# Patient Record
Sex: MALE | Race: WHITE | NOT HISPANIC OR LATINO | Employment: OTHER
[De-identification: names, ages, dates, MRNs, and addresses within clinical notes are randomized per-mention and may not be internally consistent; named-entity substitution may affect disease eponyms.]

---

## 2017-03-08 ENCOUNTER — HOSPITAL (OUTPATIENT)
Dept: OTHER | Age: 77
End: 2017-03-08
Attending: HOSPITALIST

## 2017-03-08 ENCOUNTER — CHARTING TRANS (OUTPATIENT)
Dept: OTHER | Age: 77
End: 2017-03-08

## 2017-03-08 ENCOUNTER — DIAGNOSTIC TRANS (OUTPATIENT)
Dept: OTHER | Age: 77
End: 2017-03-08

## 2017-03-08 LAB
ANALYZER ANC (IANC): ABNORMAL
ANION GAP SERPL CALC-SCNC: 13 MMOL/L (ref 10–20)
APTT PPP: 51 SECONDS (ref 22–30)
APTT PPP: ABNORMAL S
BASOPHILS # BLD: 0 THOUSAND/MCL (ref 0–0.3)
BASOPHILS NFR BLD: 0 %
BUN SERPL-MCNC: 15 MG/DL (ref 6–20)
BUN/CREAT SERPL: 13 (ref 7–25)
CALCIUM SERPL-MCNC: 8.8 MG/DL (ref 8.4–10.2)
CHLORIDE: 106 MMOL/L (ref 98–107)
CO2 SERPL-SCNC: 26 MMOL/L (ref 21–32)
CREAT SERPL-MCNC: 1.19 MG/DL (ref 0.67–1.17)
DIFFERENTIAL METHOD BLD: ABNORMAL
EOSINOPHIL # BLD: 0.1 THOUSAND/MCL (ref 0.1–0.5)
EOSINOPHIL NFR BLD: 3 %
ERYTHROCYTE [DISTWIDTH] IN BLOOD: 12.7 % (ref 11–15)
GLUCOSE SERPL-MCNC: 108 MG/DL (ref 65–99)
HEMATOCRIT: 37.6 % (ref 39–51)
HGB BLD-MCNC: 12.9 GM/DL (ref 13–17)
INR PPP: 1
LYMPHOCYTES # BLD: 1.3 THOUSAND/MCL (ref 1–4)
LYMPHOCYTES NFR BLD: 33 %
MCH RBC QN AUTO: 31.9 PG (ref 26–34)
MCHC RBC AUTO-ENTMCNC: 34.3 GM/DL (ref 32–36.5)
MCV RBC AUTO: 92.8 FL (ref 78–100)
MONOCYTES # BLD: 0.3 THOUSAND/MCL (ref 0.3–0.9)
MONOCYTES NFR BLD: 8 %
NEUTROPHILS # BLD: 2.2 THOUSAND/MCL (ref 1.8–7.7)
NEUTROPHILS NFR BLD: 56 %
NEUTS SEG NFR BLD: ABNORMAL %
PERCENT NRBC: ABNORMAL
PLATELET # BLD: 147 THOUSAND/MCL (ref 140–450)
POTASSIUM SERPL-SCNC: 4.2 MMOL/L (ref 3.4–5.1)
PROTHROMBIN TIME: 10.7 SECONDS (ref 9.7–11.8)
PROTHROMBIN TIME: NORMAL
RBC # BLD: 4.05 MILLION/MCL (ref 4.5–5.9)
SODIUM SERPL-SCNC: 141 MMOL/L (ref 135–145)
TROPONIN I SERPL HS-MCNC: 0.09 NG/ML
TROPONIN I SERPL HS-MCNC: 2.87 NG/ML
WBC # BLD: 4 THOUSAND/MCL (ref 4.2–11)

## 2017-03-09 LAB
ANALYZER ANC (IANC): ABNORMAL
ANION GAP SERPL CALC-SCNC: 11 MMOL/L (ref 10–20)
BUN SERPL-MCNC: 12 MG/DL (ref 6–20)
BUN/CREAT SERPL: 11 (ref 7–25)
CALCIUM SERPL-MCNC: 8.9 MG/DL (ref 8.4–10.2)
CHLORIDE: 107 MMOL/L (ref 98–107)
CO2 SERPL-SCNC: 26 MMOL/L (ref 21–32)
CREAT SERPL-MCNC: 1.14 MG/DL (ref 0.67–1.17)
ERYTHROCYTE [DISTWIDTH] IN BLOOD: 13.1 % (ref 11–15)
GLUCOSE SERPL-MCNC: 99 MG/DL (ref 65–99)
HEMATOCRIT: 34.7 % (ref 39–51)
HGB BLD-MCNC: 11.7 GM/DL (ref 13–17)
MCH RBC QN AUTO: 31.2 PG (ref 26–34)
MCHC RBC AUTO-ENTMCNC: 33.7 GM/DL (ref 32–36.5)
MCV RBC AUTO: 92.5 FL (ref 78–100)
PLATELET # BLD: 122 THOUSAND/MCL (ref 140–450)
POTASSIUM SERPL-SCNC: 4.6 MMOL/L (ref 3.4–5.1)
RBC # BLD: 3.75 MILLION/MCL (ref 4.5–5.9)
SODIUM SERPL-SCNC: 139 MMOL/L (ref 135–145)
TROPONIN I SERPL HS-MCNC: 1.47 NG/ML
WBC # BLD: 4.9 THOUSAND/MCL (ref 4.2–11)

## 2017-03-23 ENCOUNTER — HOSPITAL (OUTPATIENT)
Dept: OTHER | Age: 77
End: 2017-03-23
Attending: INTERNAL MEDICINE

## 2017-03-27 ENCOUNTER — HOSPITAL (OUTPATIENT)
Dept: OTHER | Age: 77
End: 2017-03-27
Attending: HOSPITALIST

## 2017-03-27 ENCOUNTER — CHARTING TRANS (OUTPATIENT)
Dept: OTHER | Age: 77
End: 2017-03-27

## 2017-03-27 LAB
ANALYZER ANC (IANC): ABNORMAL
ANION GAP SERPL CALC-SCNC: 16 MMOL/L (ref 10–20)
APTT PPP: 26 SECONDS (ref 22–30)
APTT PPP: 53 SECONDS (ref 22–30)
APTT PPP: ABNORMAL S
APTT PPP: NORMAL S
BASOPHILS # BLD: 0 THOUSAND/MCL (ref 0–0.3)
BASOPHILS NFR BLD: 0 %
BUN SERPL-MCNC: 18 MG/DL (ref 6–20)
BUN/CREAT SERPL: 15 (ref 7–25)
CALCIUM SERPL-MCNC: 9 MG/DL (ref 8.4–10.2)
CHLORIDE: 103 MMOL/L (ref 98–107)
CO2 SERPL-SCNC: 26 MMOL/L (ref 21–32)
CREAT SERPL-MCNC: 1.19 MG/DL (ref 0.67–1.17)
DIFFERENTIAL METHOD BLD: ABNORMAL
EOSINOPHIL # BLD: 0.1 THOUSAND/MCL (ref 0.1–0.5)
EOSINOPHIL NFR BLD: 2 %
ERYTHROCYTE [DISTWIDTH] IN BLOOD: 12.8 % (ref 11–15)
GLUCOSE SERPL-MCNC: 111 MG/DL (ref 65–99)
HEMATOCRIT: 37.2 % (ref 39–51)
HGB BLD-MCNC: 12.7 GM/DL (ref 13–17)
INR PPP: 1
LYMPHOCYTES # BLD: 1.1 THOUSAND/MCL (ref 1–4)
LYMPHOCYTES NFR BLD: 23 %
MCH RBC QN AUTO: 31.7 PG (ref 26–34)
MCHC RBC AUTO-ENTMCNC: 34.1 GM/DL (ref 32–36.5)
MCV RBC AUTO: 92.8 FL (ref 78–100)
MONOCYTES # BLD: 0.5 THOUSAND/MCL (ref 0.3–0.9)
MONOCYTES NFR BLD: 9 %
NEUTROPHILS # BLD: 3.2 THOUSAND/MCL (ref 1.8–7.7)
NEUTROPHILS NFR BLD: 66 %
NEUTS SEG NFR BLD: ABNORMAL %
PERCENT NRBC: ABNORMAL
PLATELET # BLD: 145 THOUSAND/MCL (ref 140–450)
POTASSIUM SERPL-SCNC: 4.6 MMOL/L (ref 3.4–5.1)
PROTHROMBIN TIME: 11.1 SECONDS (ref 9.7–11.8)
PROTHROMBIN TIME: NORMAL
RBC # BLD: 4.01 MILLION/MCL (ref 4.5–5.9)
SODIUM SERPL-SCNC: 140 MMOL/L (ref 135–145)
TROPONIN I SERPL HS-MCNC: <0.02 NG/ML
WBC # BLD: 4.9 THOUSAND/MCL (ref 4.2–11)

## 2017-03-28 ENCOUNTER — DIAGNOSTIC TRANS (OUTPATIENT)
Dept: OTHER | Age: 77
End: 2017-03-28

## 2017-03-28 LAB
ANALYZER ANC (IANC): ABNORMAL
ANION GAP SERPL CALC-SCNC: 14 MMOL/L (ref 10–20)
BUN SERPL-MCNC: 16 MG/DL (ref 6–20)
BUN/CREAT SERPL: 13 (ref 7–25)
CALCIUM SERPL-MCNC: 9 MG/DL (ref 8.4–10.2)
CHLORIDE: 106 MMOL/L (ref 98–107)
CO2 SERPL-SCNC: 26 MMOL/L (ref 21–32)
CREAT SERPL-MCNC: 1.2 MG/DL (ref 0.67–1.17)
ERYTHROCYTE [DISTWIDTH] IN BLOOD: 13 % (ref 11–15)
GLUCOSE SERPL-MCNC: 96 MG/DL (ref 65–99)
HEMATOCRIT: 37.5 % (ref 39–51)
HGB BLD-MCNC: 12.6 GM/DL (ref 13–17)
MAGNESIUM SERPL-MCNC: 2.1 MG/DL (ref 1.7–2.4)
MCH RBC QN AUTO: 31.3 PG (ref 26–34)
MCHC RBC AUTO-ENTMCNC: 33.6 GM/DL (ref 32–36.5)
MCV RBC AUTO: 93.3 FL (ref 78–100)
PLATELET # BLD: 130 THOUSAND/MCL (ref 140–450)
POTASSIUM SERPL-SCNC: 4.6 MMOL/L (ref 3.4–5.1)
RBC # BLD: 4.02 MILLION/MCL (ref 4.5–5.9)
SODIUM SERPL-SCNC: 141 MMOL/L (ref 135–145)
TROPONIN I SERPL HS-MCNC: <0.02 NG/ML
WBC # BLD: 3.3 THOUSAND/MCL (ref 4.2–11)

## 2017-03-29 LAB
ANALYZER ANC (IANC): ABNORMAL
ERYTHROCYTE [DISTWIDTH] IN BLOOD: 13 % (ref 11–15)
HEMATOCRIT: 36.7 % (ref 39–51)
HGB BLD-MCNC: 12.5 GM/DL (ref 13–17)
MCH RBC QN AUTO: 31.6 PG (ref 26–34)
MCHC RBC AUTO-ENTMCNC: 34.1 GM/DL (ref 32–36.5)
MCV RBC AUTO: 92.7 FL (ref 78–100)
PLATELET # BLD: 127 THOUSAND/MCL (ref 140–450)
RBC # BLD: 3.96 MILLION/MCL (ref 4.5–5.9)
WBC # BLD: 3.7 THOUSAND/MCL (ref 4.2–11)

## 2017-04-01 ENCOUNTER — HOSPITAL (OUTPATIENT)
Dept: OTHER | Age: 77
End: 2017-04-01
Attending: INTERNAL MEDICINE

## 2017-05-01 ENCOUNTER — HOSPITAL (OUTPATIENT)
Dept: OTHER | Age: 77
End: 2017-05-01
Attending: INTERNAL MEDICINE

## 2017-06-01 ENCOUNTER — HOSPITAL (OUTPATIENT)
Dept: OTHER | Age: 77
End: 2017-06-01
Attending: INTERNAL MEDICINE

## 2017-09-14 LAB
ANALYZER ANC (IANC): ABNORMAL
ANION GAP SERPL CALC-SCNC: 14 MMOL/L (ref 10–20)
BASOPHILS # BLD: 0 THOUSAND/MCL (ref 0–0.3)
BASOPHILS NFR BLD: 0 %
BUN SERPL-MCNC: 14 MG/DL (ref 6–20)
BUN/CREAT SERPL: 12 (ref 7–25)
CALCIUM SERPL-MCNC: 9.5 MG/DL (ref 8.4–10.2)
CHLORIDE: 95 MMOL/L (ref 98–107)
CO2 SERPL-SCNC: 23 MMOL/L (ref 21–32)
CREAT SERPL-MCNC: 1.17 MG/DL (ref 0.67–1.17)
DIFFERENTIAL METHOD BLD: ABNORMAL
EOSINOPHIL # BLD: 0.1 THOUSAND/MCL (ref 0.1–0.5)
EOSINOPHIL NFR BLD: 1 %
ERYTHROCYTE [DISTWIDTH] IN BLOOD: 12.5 % (ref 11–15)
GLUCOSE SERPL-MCNC: 112 MG/DL (ref 65–99)
HEMATOCRIT: 37.4 % (ref 39–51)
HGB BLD-MCNC: 13 GM/DL (ref 13–17)
LYMPHOCYTES # BLD: 1 THOUSAND/MCL (ref 1–4)
LYMPHOCYTES NFR BLD: 21 %
MCH RBC QN AUTO: 31 PG (ref 26–34)
MCHC RBC AUTO-ENTMCNC: 34.8 GM/DL (ref 32–36.5)
MCV RBC AUTO: 89 FL (ref 78–100)
MONOCYTES # BLD: 0.5 THOUSAND/MCL (ref 0.3–0.9)
MONOCYTES NFR BLD: 9 %
NEUTROPHILS # BLD: 3.3 THOUSAND/MCL (ref 1.8–7.7)
NEUTROPHILS NFR BLD: 69 %
NEUTS SEG NFR BLD: ABNORMAL %
PERCENT NRBC: ABNORMAL
PLATELET # BLD: 161 THOUSAND/MCL (ref 140–450)
POTASSIUM SERPL-SCNC: 4.8 MMOL/L (ref 3.4–5.1)
RBC # BLD: 4.2 MILLION/MCL (ref 4.5–5.9)
SODIUM SERPL-SCNC: 127 MMOL/L (ref 135–145)
TROPONIN I SERPL HS-MCNC: <0.02 NG/ML
TROPONIN I SERPL HS-MCNC: <0.02 NG/ML
WBC # BLD: 4.8 THOUSAND/MCL (ref 4.2–11)

## 2017-09-15 ENCOUNTER — DIAGNOSTIC TRANS (OUTPATIENT)
Dept: OTHER | Age: 77
End: 2017-09-15

## 2017-09-15 ENCOUNTER — HOSPITAL (OUTPATIENT)
Dept: OTHER | Age: 77
End: 2017-09-15
Attending: INTERNAL MEDICINE

## 2017-09-15 LAB
SODIUM SERPL-SCNC: 133 MMOL/L (ref 135–145)
TROPONIN I SERPL HS-MCNC: <0.02 NG/ML

## 2017-09-16 ENCOUNTER — CHARTING TRANS (OUTPATIENT)
Dept: OTHER | Age: 77
End: 2017-09-16

## 2017-10-10 PROCEDURE — 84153 ASSAY OF PSA TOTAL: CPT | Performed by: PHYSICIAN ASSISTANT

## 2017-10-10 PROCEDURE — 84154 ASSAY OF PSA FREE: CPT | Performed by: PHYSICIAN ASSISTANT

## 2017-10-12 ENCOUNTER — HOSPITAL (OUTPATIENT)
Dept: OTHER | Age: 77
End: 2017-10-12
Attending: INTERNAL MEDICINE

## 2018-03-13 PROBLEM — I25.810 CORONARY ARTERY DISEASE INVOLVING CORONARY BYPASS GRAFT OF NATIVE HEART WITHOUT ANGINA PECTORIS: Status: ACTIVE | Noted: 2018-03-13

## 2018-03-20 PROBLEM — D69.6 THROMBOCYTOPENIA (HCC): Status: ACTIVE | Noted: 2018-03-20

## 2018-03-20 PROBLEM — I42.0 DILATED CARDIOMYOPATHY (HCC): Status: ACTIVE | Noted: 2018-03-20

## 2018-03-20 PROBLEM — D69.6 THROMBOCYTOPENIA: Status: ACTIVE | Noted: 2018-03-20

## 2018-03-20 PROCEDURE — 87046 STOOL CULTR AEROBIC BACT EA: CPT | Performed by: INTERNAL MEDICINE

## 2018-03-20 PROCEDURE — 87493 C DIFF AMPLIFIED PROBE: CPT | Performed by: INTERNAL MEDICINE

## 2018-03-20 PROCEDURE — 87045 FECES CULTURE AEROBIC BACT: CPT | Performed by: INTERNAL MEDICINE

## 2018-04-01 ENCOUNTER — CHARTING TRANS (OUTPATIENT)
Dept: OTHER | Age: 78
End: 2018-04-01

## 2018-04-01 ENCOUNTER — HOSPITAL (OUTPATIENT)
Dept: OTHER | Age: 78
End: 2018-04-01
Attending: INTERNAL MEDICINE

## 2018-04-01 LAB
ANALYZER ANC (IANC): ABNORMAL
ANION GAP SERPL CALC-SCNC: 12 MMOL/L (ref 10–20)
BASOPHILS # BLD: 0 THOUSAND/MCL (ref 0–0.3)
BASOPHILS NFR BLD: 0 %
BUN SERPL-MCNC: 13 MG/DL (ref 6–20)
BUN/CREAT SERPL: 10 (ref 7–25)
CALCIUM SERPL-MCNC: 9.3 MG/DL (ref 8.4–10.2)
CHLORIDE: 103 MMOL/L (ref 98–107)
CO2 SERPL-SCNC: 27 MMOL/L (ref 21–32)
CREAT SERPL-MCNC: 1.28 MG/DL (ref 0.67–1.17)
DIFFERENTIAL METHOD BLD: ABNORMAL
EOSINOPHIL # BLD: 0.2 THOUSAND/MCL (ref 0.1–0.5)
EOSINOPHIL NFR BLD: 4 %
ERYTHROCYTE [DISTWIDTH] IN BLOOD: 13.5 % (ref 11–15)
GLUCOSE SERPL-MCNC: 101 MG/DL (ref 65–99)
HEMATOCRIT: 40 % (ref 39–51)
HGB BLD-MCNC: 13.5 GM/DL (ref 13–17)
LYMPHOCYTES # BLD: 1.6 THOUSAND/MCL (ref 1–4)
LYMPHOCYTES NFR BLD: 35 %
MCH RBC QN AUTO: 31.6 PG (ref 26–34)
MCHC RBC AUTO-ENTMCNC: 33.8 GM/DL (ref 32–36.5)
MCV RBC AUTO: 93.7 FL (ref 78–100)
MONOCYTES # BLD: 0.6 THOUSAND/MCL (ref 0.3–0.9)
MONOCYTES NFR BLD: 13 %
NEUTROPHILS # BLD: 2.2 THOUSAND/MCL (ref 1.8–7.7)
NEUTROPHILS NFR BLD: 48 %
NEUTS SEG NFR BLD: ABNORMAL %
PERCENT NRBC: ABNORMAL
PLATELET # BLD: 189 THOUSAND/MCL (ref 140–450)
POTASSIUM SERPL-SCNC: 4.1 MMOL/L (ref 3.4–5.1)
RBC # BLD: 4.27 MILLION/MCL (ref 4.5–5.9)
SODIUM SERPL-SCNC: 138 MMOL/L (ref 135–145)
TROPONIN I SERPL HS-MCNC: 0.02 NG/ML
TROPONIN I SERPL HS-MCNC: <0.02 NG/ML
WBC # BLD: 4.5 THOUSAND/MCL (ref 4.2–11)

## 2018-04-02 ENCOUNTER — DIAGNOSTIC TRANS (OUTPATIENT)
Dept: OTHER | Age: 78
End: 2018-04-02

## 2018-04-03 PROCEDURE — 83516 IMMUNOASSAY NONANTIBODY: CPT | Performed by: INTERNAL MEDICINE

## 2018-04-03 PROCEDURE — 86256 FLUORESCENT ANTIBODY TITER: CPT | Performed by: INTERNAL MEDICINE

## 2018-04-03 PROCEDURE — 82784 ASSAY IGA/IGD/IGG/IGM EACH: CPT | Performed by: INTERNAL MEDICINE

## 2018-04-10 ENCOUNTER — HOSPITAL (OUTPATIENT)
Dept: OTHER | Age: 78
End: 2018-04-10
Attending: EMERGENCY MEDICINE

## 2018-04-16 RX ORDER — MAG HYDROX/ALUMINUM HYD/SIMETH 400-400-40
SUSPENSION, ORAL (FINAL DOSE FORM) ORAL DAILY
COMMUNITY

## 2018-04-16 RX ORDER — MULTIVITAMIN
1 TABLET ORAL DAILY
COMMUNITY

## 2018-04-16 RX ORDER — HYDRALAZINE HYDROCHLORIDE 25 MG/1
25 TABLET, FILM COATED ORAL AS NEEDED
COMMUNITY
End: 2018-06-05

## 2018-04-16 RX ORDER — THIAMINE HCL 100 MG
500 TABLET ORAL DAILY
COMMUNITY

## 2018-04-20 ENCOUNTER — HOSPITAL ENCOUNTER (OUTPATIENT)
Facility: HOSPITAL | Age: 78
Setting detail: HOSPITAL OUTPATIENT SURGERY
Discharge: HOME | End: 2018-04-20
Attending: INTERNAL MEDICINE | Admitting: INTERNAL MEDICINE
Payer: MEDICARE

## 2018-04-20 ENCOUNTER — SURGERY (OUTPATIENT)
Age: 78
End: 2018-04-20

## 2018-04-20 VITALS
WEIGHT: 158 LBS | TEMPERATURE: 98 F | DIASTOLIC BLOOD PRESSURE: 78 MMHG | HEIGHT: 72 IN | OXYGEN SATURATION: 100 % | BODY MASS INDEX: 21.4 KG/M2 | RESPIRATION RATE: 16 BRPM | HEART RATE: 52 BPM | SYSTOLIC BLOOD PRESSURE: 152 MMHG

## 2018-04-20 DIAGNOSIS — R19.7 DIARRHEA, UNSPECIFIED TYPE: ICD-10-CM

## 2018-04-20 PROCEDURE — 88305 TISSUE EXAM BY PATHOLOGIST: CPT | Performed by: INTERNAL MEDICINE

## 2018-04-20 PROCEDURE — 0DBK8ZX EXCISION OF ASCENDING COLON, VIA NATURAL OR ARTIFICIAL OPENING ENDOSCOPIC, DIAGNOSTIC: ICD-10-PCS | Performed by: INTERNAL MEDICINE

## 2018-04-20 PROCEDURE — 0DBN8ZX EXCISION OF SIGMOID COLON, VIA NATURAL OR ARTIFICIAL OPENING ENDOSCOPIC, DIAGNOSTIC: ICD-10-PCS | Performed by: INTERNAL MEDICINE

## 2018-04-20 RX ORDER — SODIUM CHLORIDE, SODIUM LACTATE, POTASSIUM CHLORIDE, CALCIUM CHLORIDE 600; 310; 30; 20 MG/100ML; MG/100ML; MG/100ML; MG/100ML
INJECTION, SOLUTION INTRAVENOUS CONTINUOUS
Status: DISCONTINUED | OUTPATIENT
Start: 2018-04-20 | End: 2018-04-20

## 2018-04-20 NOTE — OPERATIVE REPORT
OPERATIVE REPORT   PATIENT NAME: Jay Self  MRN: [id number]  DATE OF OPERATION: 4/20/2018  PREOPERATIVE DIAGNOSIS: Diarrhea possibly attributed to medications  POSTOPERATIVE DIAGNOSES   1.  Tortuous colon  2. 8 mm sessile descending colon polyp removed w and an updated medication list.   PREP Quality indicators:  Aronchick scale    EXCELLENT - small volume of clear liquid > 95% of mucosa see  GOOD  - clear liquid covering up to 25% of mucosa, but > 90% of mucosa seen  FAIR  - some semisolid stool could be

## 2018-04-20 NOTE — H&P
History & Physical Examination    Patient Name: Indu Hyunh  MRN: [id number]  CSN: [id number]  YOB: 1940    Diagnosis: Diarrhea, unspecified type [R19.7]      Present Illness:  Indu Huynh is a 66year old male is here Diarrhea, unspecifie walk.  Sulfa Antibiotics       Rash  Zocor [Simvastatin]     Pain    Comment:joint    Past Surgical History:  1/28/2014: BYPASS SURGERY      Comment: CABG x 5 LIMA-LAD, SVG- 2nd diagonal, 1st OM,                Posterior descending & RCA  No date: CABG

## 2018-04-20 NOTE — PROGRESS NOTES
After patient had been brought to recovery, Dr. Allyson Merlin realized he needed to take random colon biopsies. Patient brought back to procedure room, appropriate time outs performed, biopsies obtained and patient brought back to recovery.

## 2018-04-28 NOTE — PROGRESS NOTES
Your colonoscopy showed a polyp in the colon that was removed. Here are the  biopsy/pathology findings from your recent Colonoscopy : Adenomatous polyp(s) was(were) found in the colon. These polyps are benign but can carry cancer risk.   Polyp(s) was

## 2018-05-22 ENCOUNTER — HOSPITAL (OUTPATIENT)
Dept: OTHER | Age: 78
End: 2018-05-22
Attending: INTERNAL MEDICINE

## 2018-07-21 ENCOUNTER — CHARTING TRANS (OUTPATIENT)
Dept: OTHER | Age: 78
End: 2018-07-21

## 2018-08-29 ENCOUNTER — CHARTING TRANS (OUTPATIENT)
Dept: OTHER | Age: 78
End: 2018-08-29

## 2018-10-11 ENCOUNTER — HOSPITAL (OUTPATIENT)
Dept: OTHER | Age: 78
End: 2018-10-11
Attending: EMERGENCY MEDICINE

## 2018-10-11 LAB
ANALYZER ANC (IANC): ABNORMAL
ANION GAP SERPL CALC-SCNC: 14 MMOL/L (ref 10–20)
BASOPHILS # BLD: 0 THOUSAND/MCL (ref 0–0.3)
BASOPHILS NFR BLD: 1 %
BUN SERPL-MCNC: 17 MG/DL (ref 6–20)
BUN/CREAT SERPL: 15 (ref 7–25)
CALCIUM SERPL-MCNC: 9.5 MG/DL (ref 8.4–10.2)
CHLORIDE: 101 MMOL/L (ref 98–107)
CO2 SERPL-SCNC: 27 MMOL/L (ref 21–32)
CREAT SERPL-MCNC: 1.17 MG/DL (ref 0.67–1.17)
DIFFERENTIAL METHOD BLD: ABNORMAL
EOSINOPHIL # BLD: 0.2 THOUSAND/MCL (ref 0.1–0.5)
EOSINOPHIL NFR BLD: 5 %
ERYTHROCYTE [DISTWIDTH] IN BLOOD: 13.1 % (ref 11–15)
GLUCOSE SERPL-MCNC: 104 MG/DL (ref 65–99)
HEMATOCRIT: 39.1 % (ref 39–51)
HGB BLD-MCNC: 13.3 GM/DL (ref 13–17)
LYMPHOCYTES # BLD: 1.3 THOUSAND/MCL (ref 1–4)
LYMPHOCYTES NFR BLD: 35 %
MCH RBC QN AUTO: 31.9 PG (ref 26–34)
MCHC RBC AUTO-ENTMCNC: 34 GM/DL (ref 32–36.5)
MCV RBC AUTO: 93.8 FL (ref 78–100)
MONOCYTES # BLD: 0.4 THOUSAND/MCL (ref 0.3–0.9)
MONOCYTES NFR BLD: 11 %
NEUTROPHILS # BLD: 1.7 THOUSAND/MCL (ref 1.8–7.7)
NEUTROPHILS NFR BLD: 48 %
NEUTS SEG NFR BLD: ABNORMAL %
NRBC (NRBCRE): ABNORMAL
PLATELET # BLD: 168 THOUSAND/MCL (ref 140–450)
POTASSIUM SERPL-SCNC: 4.8 MMOL/L (ref 3.4–5.1)
RBC # BLD: 4.17 MILLION/MCL (ref 4.5–5.9)
SODIUM SERPL-SCNC: 137 MMOL/L (ref 135–145)
TROPONIN I SERPL HS-MCNC: 0.03 NG/ML
TROPONIN I SERPL HS-MCNC: <0.02 NG/ML
WBC # BLD: 3.5 THOUSAND/MCL (ref 4.2–11)

## 2018-10-12 ENCOUNTER — DIAGNOSTIC TRANS (OUTPATIENT)
Dept: OTHER | Age: 78
End: 2018-10-12

## 2018-10-31 VITALS
WEIGHT: 160 LBS | OXYGEN SATURATION: 100 % | DIASTOLIC BLOOD PRESSURE: 70 MMHG | BODY MASS INDEX: 21.67 KG/M2 | TEMPERATURE: 97.7 F | RESPIRATION RATE: 18 BRPM | SYSTOLIC BLOOD PRESSURE: 138 MMHG | HEART RATE: 56 BPM | HEIGHT: 72 IN

## 2018-11-27 VITALS
DIASTOLIC BLOOD PRESSURE: 82 MMHG | RESPIRATION RATE: 16 BRPM | SYSTOLIC BLOOD PRESSURE: 132 MMHG | WEIGHT: 159 LBS | HEART RATE: 60 BPM | TEMPERATURE: 97.8 F | HEIGHT: 72 IN | BODY MASS INDEX: 21.54 KG/M2

## 2019-02-03 ENCOUNTER — DIAGNOSTIC TRANS (OUTPATIENT)
Dept: OTHER | Age: 79
End: 2019-02-03

## 2019-02-03 ENCOUNTER — HOSPITAL (OUTPATIENT)
Dept: OTHER | Age: 79
End: 2019-02-03
Attending: INTERNAL MEDICINE

## 2019-02-03 ENCOUNTER — HOSPITAL (OUTPATIENT)
Dept: OTHER | Age: 79
End: 2019-02-03

## 2019-02-03 LAB
ANALYZER ANC (IANC): ABNORMAL
ANION GAP SERPL CALC-SCNC: 14 MMOL/L (ref 10–20)
BASOPHILS # BLD: 0 THOUSAND/MCL (ref 0–0.3)
BASOPHILS NFR BLD: 1 %
BUN SERPL-MCNC: 17 MG/DL (ref 6–20)
BUN/CREAT SERPL: 13 (ref 7–25)
CALCIUM SERPL-MCNC: 9.2 MG/DL (ref 8.4–10.2)
CHLORIDE: 102 MMOL/L (ref 98–107)
CO2 SERPL-SCNC: 25 MMOL/L (ref 21–32)
CREAT SERPL-MCNC: 1.26 MG/DL (ref 0.67–1.17)
DIFFERENTIAL METHOD BLD: ABNORMAL
EOSINOPHIL # BLD: 0.2 THOUSAND/MCL (ref 0.1–0.5)
EOSINOPHIL NFR BLD: 3 %
ERYTHROCYTE [DISTWIDTH] IN BLOOD: 12.6 % (ref 11–15)
ERYTHROCYTE [SEDIMENTATION RATE] IN BLOOD BY WESTERGREN METHOD: 8 MM/HR (ref 0–20)
GLUCOSE SERPL-MCNC: 104 MG/DL (ref 65–99)
HEMATOCRIT: 39.6 % (ref 39–51)
HGB BLD-MCNC: 13.5 GM/DL (ref 13–17)
IMM GRANULOCYTES # BLD AUTO: 0 THOUSAND/MCL (ref 0–0.2)
IMM GRANULOCYTES NFR BLD: 0 %
LYMPHOCYTES # BLD: 1.1 THOUSAND/MCL (ref 1–4)
LYMPHOCYTES NFR BLD: 25 %
MCH RBC QN AUTO: 31.7 PG (ref 26–34)
MCHC RBC AUTO-ENTMCNC: 34.1 GM/DL (ref 32–36.5)
MCV RBC AUTO: 93 FL (ref 78–100)
MONOCYTES # BLD: 0.5 THOUSAND/MCL (ref 0.3–0.9)
MONOCYTES NFR BLD: 11 %
NEUTROPHILS # BLD: 2.6 THOUSAND/MCL (ref 1.8–7.7)
NEUTROPHILS NFR BLD: 60 %
NEUTS SEG NFR BLD: ABNORMAL %
NRBC (NRBCRE): 0 /100 WBC
PLATELET # BLD: 158 THOUSAND/MCL (ref 140–450)
POTASSIUM SERPL-SCNC: 4.4 MMOL/L (ref 3.4–5.1)
RBC # BLD: 4.26 MILLION/MCL (ref 4.5–5.9)
SODIUM SERPL-SCNC: 137 MMOL/L (ref 135–145)
TROPONIN I SERPL HS-MCNC: <0.02 NG/ML
WBC # BLD: 4.4 THOUSAND/MCL (ref 4.2–11)

## 2019-02-04 ENCOUNTER — HOSPITAL (OUTPATIENT)
Dept: OTHER | Age: 79
End: 2019-02-04

## 2019-02-04 LAB
ALBUMIN SERPL-MCNC: 3.6 GM/DL (ref 3.6–5.1)
ALBUMIN/GLOB SERPL: 1.4 {RATIO} (ref 1–2.4)
ALP SERPL-CCNC: 73 UNIT/L (ref 45–117)
ALT SERPL-CCNC: 17 UNIT/L
ANALYZER ANC (IANC): ABNORMAL
ANION GAP SERPL CALC-SCNC: 13 MMOL/L (ref 10–20)
AST SERPL-CCNC: 12 UNIT/L
BASOPHILS # BLD: 0 THOUSAND/MCL (ref 0–0.3)
BASOPHILS NFR BLD: 1 %
BILIRUB SERPL-MCNC: 0.5 MG/DL (ref 0.2–1)
BUN SERPL-MCNC: 17 MG/DL (ref 6–20)
BUN/CREAT SERPL: 13 (ref 7–25)
CALCIUM SERPL-MCNC: 9.1 MG/DL (ref 8.4–10.2)
CHLORIDE: 105 MMOL/L (ref 98–107)
CO2 SERPL-SCNC: 25 MMOL/L (ref 21–32)
CREAT SERPL-MCNC: 1.35 MG/DL (ref 0.67–1.17)
DIFFERENTIAL METHOD BLD: ABNORMAL
EOSINOPHIL # BLD: 0.2 THOUSAND/MCL (ref 0.1–0.5)
EOSINOPHIL NFR BLD: 5 %
ERYTHROCYTE [DISTWIDTH] IN BLOOD: 12.7 % (ref 11–15)
GLOBULIN SER-MCNC: 2.6 GM/DL (ref 2–4)
GLUCOSE SERPL-MCNC: 96 MG/DL (ref 65–99)
HEMATOCRIT: 37.6 % (ref 39–51)
HGB BLD-MCNC: 12.9 GM/DL (ref 13–17)
IMM GRANULOCYTES # BLD AUTO: 0 THOUSAND/MCL (ref 0–0.2)
IMM GRANULOCYTES NFR BLD: 0 %
LYMPHOCYTES # BLD: 1.2 THOUSAND/MCL (ref 1–4)
LYMPHOCYTES NFR BLD: 31 %
MAGNESIUM SERPL-MCNC: 2.1 MG/DL (ref 1.7–2.4)
MCH RBC QN AUTO: 31.5 PG (ref 26–34)
MCHC RBC AUTO-ENTMCNC: 34.3 GM/DL (ref 32–36.5)
MCV RBC AUTO: 91.7 FL (ref 78–100)
MONOCYTES # BLD: 0.5 THOUSAND/MCL (ref 0.3–0.9)
MONOCYTES NFR BLD: 13 %
NEUTROPHILS # BLD: 2 THOUSAND/MCL (ref 1.8–7.7)
NEUTROPHILS NFR BLD: 50 %
NEUTS SEG NFR BLD: ABNORMAL %
NRBC (NRBCRE): 0 /100 WBC
PLATELET # BLD: 148 THOUSAND/MCL (ref 140–450)
POTASSIUM SERPL-SCNC: 4.5 MMOL/L (ref 3.4–5.1)
PROT SERPL-MCNC: 6.2 GM/DL (ref 6.4–8.2)
RBC # BLD: 4.1 MILLION/MCL (ref 4.5–5.9)
SODIUM SERPL-SCNC: 138 MMOL/L (ref 135–145)
WBC # BLD: 3.9 THOUSAND/MCL (ref 4.2–11)

## 2019-02-13 PROBLEM — D69.6 THROMBOCYTOPENIA (HCC): Status: RESOLVED | Noted: 2018-03-20 | Resolved: 2019-02-13

## 2019-02-13 PROBLEM — R07.9 CHEST PAIN: Status: ACTIVE | Noted: 2019-02-13

## 2019-02-13 PROBLEM — D69.6 THROMBOCYTOPENIA: Status: RESOLVED | Noted: 2018-03-20 | Resolved: 2019-02-13

## 2019-02-13 PROBLEM — I25.5 ISCHEMIC CARDIOMYOPATHY: Status: ACTIVE | Noted: 2019-02-13

## 2019-02-13 PROBLEM — I70.0 ATHEROSCLEROSIS OF AORTA (HCC): Status: ACTIVE | Noted: 2019-02-13

## 2019-02-13 PROBLEM — I51.89 DIASTOLIC DYSFUNCTION: Status: ACTIVE | Noted: 2019-02-13

## 2019-02-13 PROBLEM — I70.0 ATHEROSCLEROSIS OF AORTA: Status: ACTIVE | Noted: 2019-02-13

## 2019-02-13 PROBLEM — N18.30 CKD (CHRONIC KIDNEY DISEASE) STAGE 3, GFR 30-59 ML/MIN (HCC): Status: ACTIVE | Noted: 2019-02-13

## 2019-02-24 ENCOUNTER — DIAGNOSTIC TRANS (OUTPATIENT)
Dept: OTHER | Age: 79
End: 2019-02-24

## 2019-02-24 ENCOUNTER — HOSPITAL (OUTPATIENT)
Dept: OTHER | Age: 79
End: 2019-02-24
Attending: HOSPITALIST

## 2019-02-24 ENCOUNTER — HOSPITAL (OUTPATIENT)
Dept: OTHER | Age: 79
End: 2019-02-24

## 2019-02-24 LAB
ALBUMIN SERPL-MCNC: 4.3 GM/DL (ref 3.6–5.1)
ALP SERPL-CCNC: 84 UNIT/L (ref 45–117)
ALT SERPL-CCNC: 25 UNIT/L
ANALYZER ANC (IANC): ABNORMAL
ANION GAP SERPL CALC-SCNC: 16 MMOL/L (ref 10–20)
AST SERPL-CCNC: 24 UNIT/L
BASOPHILS # BLD: 0 THOUSAND/MCL (ref 0–0.3)
BASOPHILS NFR BLD: 0 %
BILIRUB CONJ SERPL-MCNC: 0.1 MG/DL (ref 0–0.2)
BILIRUB SERPL-MCNC: 0.5 MG/DL (ref 0.2–1)
BUN SERPL-MCNC: 16 MG/DL (ref 6–20)
BUN/CREAT SERPL: 12 (ref 7–25)
CALCIUM SERPL-MCNC: 9.2 MG/DL (ref 8.4–10.2)
CHLORIDE: 98 MMOL/L (ref 98–107)
CO2 SERPL-SCNC: 25 MMOL/L (ref 21–32)
CREAT SERPL-MCNC: 1.33 MG/DL (ref 0.67–1.17)
CREAT SERPL-MCNC: 1.39 MG/DL (ref 0.67–1.17)
D DIMER PPP FEU-MCNC: 0.62 MG/L FEU
DIFFERENTIAL METHOD BLD: ABNORMAL
EOSINOPHIL # BLD: 0.1 THOUSAND/MCL (ref 0.1–0.5)
EOSINOPHIL NFR BLD: 2 %
ERYTHROCYTE [DISTWIDTH] IN BLOOD: 12.3 % (ref 11–15)
GLUCOSE SERPL-MCNC: 105 MG/DL (ref 65–99)
HEMATOCRIT: 39 % (ref 39–51)
HGB BLD-MCNC: 13.3 GM/DL (ref 13–17)
IMM GRANULOCYTES # BLD AUTO: 0 THOUSAND/MCL (ref 0–0.2)
IMM GRANULOCYTES NFR BLD: 0 %
LIPASE SERPL-CCNC: 344 UNIT/L (ref 73–393)
LYMPHOCYTES # BLD: 1.6 THOUSAND/MCL (ref 1–4)
LYMPHOCYTES NFR BLD: 32 %
MAGNESIUM SERPL-MCNC: 2 MG/DL (ref 1.7–2.4)
MCH RBC QN AUTO: 31.3 PG (ref 26–34)
MCHC RBC AUTO-ENTMCNC: 34.1 GM/DL (ref 32–36.5)
MCV RBC AUTO: 91.8 FL (ref 78–100)
MONOCYTES # BLD: 0.5 THOUSAND/MCL (ref 0.3–0.9)
MONOCYTES NFR BLD: 11 %
NEUTROPHILS # BLD: 2.7 THOUSAND/MCL (ref 1.8–7.7)
NEUTROPHILS NFR BLD: 55 %
NEUTS SEG NFR BLD: ABNORMAL %
NRBC (NRBCRE): 0 /100 WBC
PLATELET # BLD: 168 THOUSAND/MCL (ref 140–450)
POTASSIUM SERPL-SCNC: 4 MMOL/L (ref 3.4–5.1)
POTASSIUM SERPL-SCNC: 4.5 MMOL/L (ref 3.4–5.1)
PROT SERPL-MCNC: 7.3 GM/DL (ref 6.4–8.2)
RBC # BLD: 4.25 MILLION/MCL (ref 4.5–5.9)
SODIUM SERPL-SCNC: 135 MMOL/L (ref 135–145)
TROPONIN I SERPL HS-MCNC: <0.02 NG/ML
TROPONIN I SERPL HS-MCNC: <0.02 NG/ML
WBC # BLD: 4.9 THOUSAND/MCL (ref 4.2–11)

## 2019-02-25 LAB
ANALYZER ANC (IANC): ABNORMAL
ANION GAP SERPL CALC-SCNC: 14 MMOL/L (ref 10–20)
BUN SERPL-MCNC: 18 MG/DL (ref 6–20)
BUN/CREAT SERPL: 12 (ref 7–25)
CALCIUM SERPL-MCNC: 8.8 MG/DL (ref 8.4–10.2)
CHLORIDE: 106 MMOL/L (ref 98–107)
CO2 SERPL-SCNC: 26 MMOL/L (ref 21–32)
CREAT SERPL-MCNC: 1.47 MG/DL (ref 0.67–1.17)
ERYTHROCYTE [DISTWIDTH] IN BLOOD: 12.6 % (ref 11–15)
GLUCOSE SERPL-MCNC: 89 MG/DL (ref 65–99)
HEMATOCRIT: 36.3 % (ref 39–51)
HGB BLD-MCNC: 12 GM/DL (ref 13–17)
MAGNESIUM SERPL-MCNC: 2 MG/DL (ref 1.7–2.4)
MCH RBC QN AUTO: 30.7 PG (ref 26–34)
MCHC RBC AUTO-ENTMCNC: 33.1 GM/DL (ref 32–36.5)
MCV RBC AUTO: 92.8 FL (ref 78–100)
NRBC (NRBCRE): 0 /100 WBC
PLATELET # BLD: 145 THOUSAND/MCL (ref 140–450)
POTASSIUM SERPL-SCNC: 4.7 MMOL/L (ref 3.4–5.1)
RBC # BLD: 3.91 MILLION/MCL (ref 4.5–5.9)
SODIUM SERPL-SCNC: 141 MMOL/L (ref 135–145)
WBC # BLD: 3.5 THOUSAND/MCL (ref 4.2–11)

## 2019-07-07 ENCOUNTER — HOSPITAL (OUTPATIENT)
Dept: OTHER | Age: 79
End: 2019-07-07
Attending: FAMILY MEDICINE

## 2019-12-18 ENCOUNTER — HOSPITAL (OUTPATIENT)
Dept: OTHER | Age: 79
End: 2019-12-18

## 2019-12-18 ENCOUNTER — DIAGNOSTIC TRANS (OUTPATIENT)
Dept: OTHER | Age: 79
End: 2019-12-18

## 2019-12-18 LAB
ALBUMIN SERPL-MCNC: 3.6 G/DL (ref 3.6–5.1)
ALP SERPL-CCNC: 72 UNITS/L (ref 45–117)
ALT SERPL-CCNC: 22 UNITS/L
ANALYZER ANC (IANC): ABNORMAL
ANALYZER ANC (IANC): ABNORMAL
ANION GAP SERPL CALC-SCNC: 8 MMOL/L (ref 10–20)
AST SERPL-CCNC: 10 UNITS/L
BASOPHILS # BLD: 0 K/MCL (ref 0–0.3)
BASOPHILS # BLD: 0 K/MCL (ref 0–0.3)
BASOPHILS NFR BLD: 0 %
BASOPHILS NFR BLD: 0 %
BILIRUB CONJ SERPL-MCNC: <0.1 MG/DL (ref 0–0.2)
BILIRUB SERPL-MCNC: 0.4 MG/DL (ref 0.2–1)
BUN SERPL-MCNC: 47 MG/DL (ref 6–20)
BUN/CREAT SERPL: 36 (ref 7–25)
CALCIUM SERPL-MCNC: 8.6 MG/DL (ref 8.4–10.2)
CHLORIDE SERPL-SCNC: 111 MMOL/L (ref 98–107)
CO2 SERPL-SCNC: 25 MMOL/L (ref 21–32)
CREAT SERPL-MCNC: 1.32 MG/DL (ref 0.67–1.17)
DIFFERENTIAL METHOD BLD: ABNORMAL
DIFFERENTIAL METHOD BLD: ABNORMAL
EOSINOPHIL # BLD: 0 K/MCL (ref 0.1–0.5)
EOSINOPHIL # BLD: 0.1 K/MCL (ref 0.1–0.5)
EOSINOPHIL NFR BLD: 0 %
EOSINOPHIL NFR BLD: 1 %
ERYTHROCYTE [DISTWIDTH] IN BLOOD: 13 % (ref 11–15)
ERYTHROCYTE [DISTWIDTH] IN BLOOD: 13.1 % (ref 11–15)
GLUCOSE SERPL-MCNC: 111 MG/DL (ref 65–99)
HCT VFR BLD CALC: 25.7 % (ref 39–51)
HCT VFR BLD CALC: 30.7 % (ref 39–51)
HCT VFR BLD CALC: 36.3 % (ref 39–51)
HGB BLD-MCNC: 10.1 G/DL (ref 13–17)
HGB BLD-MCNC: 11.7 G/DL (ref 13–17)
HGB BLD-MCNC: 8.5 G/DL (ref 13–17)
IMM GRANULOCYTES # BLD AUTO: 0 K/MCL (ref 0–0.2)
IMM GRANULOCYTES # BLD AUTO: 0 K/MCL (ref 0–0.2)
IMM GRANULOCYTES NFR BLD: 0 %
IMM GRANULOCYTES NFR BLD: 1 %
LIPASE SERPL-CCNC: 74 UNITS/L (ref 73–393)
LYMPHOCYTES # BLD: 1.6 K/MCL (ref 1–4)
LYMPHOCYTES # BLD: 1.6 K/MCL (ref 1–4)
LYMPHOCYTES NFR BLD: 21 %
LYMPHOCYTES NFR BLD: 22 %
MAGNESIUM SERPL-MCNC: 2.6 MG/DL (ref 1.7–2.4)
MCH RBC QN AUTO: 31.1 PG (ref 26–34)
MCH RBC QN AUTO: 32.2 PG (ref 26–34)
MCHC RBC AUTO-ENTMCNC: 32.2 G/DL (ref 32–36.5)
MCHC RBC AUTO-ENTMCNC: 32.9 G/DL (ref 32–36.5)
MCV RBC AUTO: 96.5 FL (ref 78–100)
MCV RBC AUTO: 97.8 FL (ref 78–100)
MONOCYTES # BLD: 0.6 K/MCL (ref 0.3–0.9)
MONOCYTES # BLD: 0.6 K/MCL (ref 0.3–0.9)
MONOCYTES NFR BLD: 8 %
MONOCYTES NFR BLD: 8 %
NEUTROPHILS # BLD: 5 K/MCL (ref 1.8–7.7)
NEUTROPHILS # BLD: 5.2 K/MCL (ref 1.8–7.7)
NEUTROPHILS NFR BLD: 69 %
NEUTROPHILS NFR BLD: 70 %
NEUTS SEG NFR BLD: ABNORMAL %
NEUTS SEG NFR BLD: ABNORMAL %
NRBC (NRBCRE): 0 /100 WBC
NRBC (NRBCRE): 0 /100 WBC
PLATELET # BLD: 176 K/MCL (ref 140–450)
PLATELET # BLD: 188 K/MCL (ref 140–450)
POTASSIUM SERPL-SCNC: 4.5 MMOL/L (ref 3.4–5.1)
PROT SERPL-MCNC: 6.4 G/DL (ref 6.4–8.2)
RBC # BLD: 3.14 MIL/MCL (ref 4.5–5.9)
RBC # BLD: 3.76 MIL/MCL (ref 4.5–5.9)
SODIUM SERPL-SCNC: 139 MMOL/L (ref 135–145)
TROPONIN I SERPL HS-MCNC: <0.02 NG/ML
TROPONIN I SERPL HS-MCNC: <0.02 NG/ML
WBC # BLD: 7.2 K/MCL (ref 4.2–11)
WBC # BLD: 7.5 K/MCL (ref 4.2–11)

## 2019-12-19 ENCOUNTER — HOSPITAL (OUTPATIENT)
Dept: OTHER | Age: 79
End: 2019-12-19

## 2019-12-19 LAB
ANION GAP SERPL CALC-SCNC: 5 MMOL/L (ref 10–20)
BUN SERPL-MCNC: 54 MG/DL (ref 6–20)
BUN/CREAT SERPL: 37 (ref 7–25)
CALCIUM SERPL-MCNC: 8.4 MG/DL (ref 8.4–10.2)
CHLORIDE SERPL-SCNC: 116 MMOL/L (ref 98–107)
CO2 SERPL-SCNC: 25 MMOL/L (ref 21–32)
CREAT SERPL-MCNC: 1.45 MG/DL (ref 0.67–1.17)
GLUCOSE SERPL-MCNC: 99 MG/DL (ref 65–99)
HCT VFR BLD CALC: 24.2 % (ref 39–51)
HCT VFR BLD CALC: 24.9 % (ref 39–51)
HCT VFR BLD CALC: 25.4 % (ref 39–51)
HGB BLD-MCNC: 7.9 G/DL (ref 13–17)
HGB BLD-MCNC: 8.1 G/DL (ref 13–17)
HGB BLD-MCNC: 8.4 G/DL (ref 13–17)
POTASSIUM SERPL-SCNC: 4.4 MMOL/L (ref 3.4–5.1)
SODIUM SERPL-SCNC: 142 MMOL/L (ref 135–145)
TROPONIN I SERPL HS-MCNC: <0.02 NG/ML

## 2019-12-20 LAB
ANION GAP SERPL CALC-SCNC: 6 MMOL/L (ref 10–20)
BUN SERPL-MCNC: 36 MG/DL (ref 6–20)
BUN/CREAT SERPL: 26 (ref 7–25)
CALCIUM SERPL-MCNC: 8.5 MG/DL (ref 8.4–10.2)
CHLORIDE SERPL-SCNC: 115 MMOL/L (ref 98–107)
CO2 SERPL-SCNC: 25 MMOL/L (ref 21–32)
CREAT SERPL-MCNC: 1.36 MG/DL (ref 0.67–1.17)
GLUCOSE SERPL-MCNC: 98 MG/DL (ref 65–99)
HCT VFR BLD CALC: 22.6 % (ref 39–51)
HCT VFR BLD CALC: 22.7 % (ref 39–51)
HGB BLD-MCNC: 7.3 G/DL (ref 13–17)
HGB BLD-MCNC: 7.4 G/DL (ref 13–17)
MAGNESIUM SERPL-MCNC: 2.2 MG/DL (ref 1.7–2.4)
POTASSIUM SERPL-SCNC: 4.3 MMOL/L (ref 3.4–5.1)
POTASSIUM SERPL-SCNC: 4.3 MMOL/L (ref 3.4–5.1)
SODIUM SERPL-SCNC: 142 MMOL/L (ref 135–145)

## 2019-12-23 LAB — PATHOLOGIST NAME: NORMAL

## 2020-03-02 PROBLEM — I13.0 HYPERTENSIVE HEART AND CHRONIC KIDNEY DISEASE WITH HEART FAILURE AND STAGE 1 THROUGH STAGE 4 CHRONIC KIDNEY DISEASE, OR UNSPECIFIED CHRONIC KIDNEY DISEASE (HCC): Status: ACTIVE | Noted: 2020-03-02

## 2020-03-12 ENCOUNTER — HOSPITAL ENCOUNTER (EMERGENCY)
Age: 80
Discharge: HOME | End: 2020-03-12
Attending: EMERGENCY MEDICINE

## 2020-03-12 ENCOUNTER — PRE-EVAL (OUTPATIENT)
Dept: GASTROENTEROLOGY | Age: 80
End: 2020-03-12

## 2020-03-12 ENCOUNTER — HOSPITAL ENCOUNTER (OUTPATIENT)
Dept: GASTROENTEROLOGY | Age: 80
Discharge: HOME | End: 2020-03-12
Attending: INTERNAL MEDICINE

## 2020-03-12 ENCOUNTER — DOCUMENTATION (OUTPATIENT)
Dept: GASTROENTEROLOGY | Age: 80
End: 2020-03-12

## 2020-03-12 VITALS
RESPIRATION RATE: 14 BRPM | HEIGHT: 72 IN | DIASTOLIC BLOOD PRESSURE: 80 MMHG | HEART RATE: 55 BPM | BODY MASS INDEX: 22.99 KG/M2 | OXYGEN SATURATION: 99 % | TEMPERATURE: 98.1 F | SYSTOLIC BLOOD PRESSURE: 168 MMHG | WEIGHT: 169.75 LBS

## 2020-03-12 VITALS
HEIGHT: 72 IN | TEMPERATURE: 97.2 F | BODY MASS INDEX: 23.44 KG/M2 | SYSTOLIC BLOOD PRESSURE: 200 MMHG | HEART RATE: 50 BPM | RESPIRATION RATE: 16 BRPM | OXYGEN SATURATION: 96 % | DIASTOLIC BLOOD PRESSURE: 95 MMHG | WEIGHT: 173.06 LBS

## 2020-03-12 DIAGNOSIS — Z87.11 HISTORY OF GASTRIC ULCER: Primary | ICD-10-CM

## 2020-03-12 DIAGNOSIS — K27.9 PEPTIC ULCER WITHOUT HEMORRHAGE OR PERFORATION BUT WITH OBSTRUCTION (CMD): ICD-10-CM

## 2020-03-12 DIAGNOSIS — K56.609 PEPTIC ULCER WITHOUT HEMORRHAGE OR PERFORATION BUT WITH OBSTRUCTION (CMD): ICD-10-CM

## 2020-03-12 DIAGNOSIS — I10 ESSENTIAL HYPERTENSION, BENIGN: Primary | ICD-10-CM

## 2020-03-12 PROCEDURE — 10002803 HB RX 637: Performed by: EMERGENCY MEDICINE

## 2020-03-12 PROCEDURE — 99282 EMERGENCY DEPT VISIT SF MDM: CPT

## 2020-03-12 PROCEDURE — 13000024 HB GI COMPLEX CASE S/U + 1ST 15 MIN

## 2020-03-12 RX ORDER — COLESEVELAM 180 1/1
1875 TABLET ORAL 2 TIMES DAILY WITH MEALS
COMMUNITY
End: 2023-03-14

## 2020-03-12 RX ORDER — EZETIMIBE 10 MG/1
10 TABLET ORAL DAILY
COMMUNITY

## 2020-03-12 RX ORDER — CLOPIDOGREL BISULFATE 75 MG/1
75 TABLET ORAL DAILY
COMMUNITY

## 2020-03-12 RX ORDER — METOPROLOL SUCCINATE 25 MG/1
25 TABLET, EXTENDED RELEASE ORAL DAILY
COMMUNITY

## 2020-03-12 RX ORDER — HYDRALAZINE HYDROCHLORIDE 10 MG/1
10 TABLET, FILM COATED ORAL ONCE
Status: COMPLETED | OUTPATIENT
Start: 2020-03-12 | End: 2020-03-12

## 2020-03-12 RX ADMIN — HYDRALAZINE HYDROCHLORIDE 10 MG: 10 TABLET ORAL at 16:11

## 2020-03-12 SDOH — HEALTH STABILITY: MENTAL HEALTH: HOW OFTEN DO YOU HAVE A DRINK CONTAINING ALCOHOL?: NEVER

## 2020-03-12 ASSESSMENT — PAIN SCALES - GENERAL
PAINLEVEL_OUTOF10: 0

## 2020-03-12 ASSESSMENT — ACTIVITIES OF DAILY LIVING (ADL)
ADL_SCORE: 12
NEEDS_ASSIST: NO
CHRONIC_PAIN_PRESENT: NO
HISTORY OF FALLING IN THE LAST YEAR (PRIOR TO ADMISSION): NO
ADL_SHORT_OF_BREATH: NO
ADL_BEFORE_ADMISSION: INDEPENDENT

## 2020-03-12 ASSESSMENT — COGNITIVE AND FUNCTIONAL STATUS - GENERAL
ARE YOU BLIND OR DO YOU HAVE SERIOUS DIFFICULTY SEEING, EVEN WHEN WEARING GLASSES: NO
ARE YOU DEAF OR DO YOU HAVE SERIOUS DIFFICULTY  HEARING: NO

## 2020-07-10 ENCOUNTER — APPOINTMENT (OUTPATIENT)
Dept: CT IMAGING | Age: 80
End: 2020-07-10
Attending: EMERGENCY MEDICINE

## 2020-07-10 ENCOUNTER — HOSPITAL ENCOUNTER (OUTPATIENT)
Age: 80
Setting detail: OBSERVATION
Discharge: HOME | End: 2020-07-12
Attending: EMERGENCY MEDICINE | Admitting: INTERNAL MEDICINE

## 2020-07-10 DIAGNOSIS — R07.9 CHEST PAIN, UNSPECIFIED TYPE: Primary | ICD-10-CM

## 2020-07-10 LAB
ANION GAP SERPL CALC-SCNC: 11 MMOL/L (ref 10–20)
APTT PPP: 26 SEC (ref 22–32)
BASOPHILS # BLD: 0 K/MCL (ref 0–0.3)
BASOPHILS NFR BLD: 0 %
BUN SERPL-MCNC: 16 MG/DL (ref 6–20)
BUN/CREAT SERPL: 13 (ref 7–25)
CALCIUM SERPL-MCNC: 8.6 MG/DL (ref 8.4–10.2)
CHLORIDE SERPL-SCNC: 99 MMOL/L (ref 98–107)
CO2 SERPL-SCNC: 25 MMOL/L (ref 21–32)
CREAT SERPL-MCNC: 1.21 MG/DL (ref 0.67–1.17)
DIFFERENTIAL METHOD BLD: ABNORMAL
EOSINOPHIL # BLD: 0.1 K/MCL (ref 0.1–0.5)
EOSINOPHIL NFR BLD: 2 %
ERYTHROCYTE [DISTWIDTH] IN BLOOD: 13.7 % (ref 11–15)
GLUCOSE SERPL-MCNC: 139 MG/DL (ref 65–99)
HCT VFR BLD CALC: 34.3 % (ref 39–51)
HGB BLD-MCNC: 12 G/DL (ref 13–17)
IMM GRANULOCYTES # BLD AUTO: 0 K/MCL (ref 0–0.2)
IMM GRANULOCYTES NFR BLD: 0 %
INR PPP: 1
LYMPHOCYTES # BLD: 1 K/MCL (ref 1–4)
LYMPHOCYTES NFR BLD: 26 %
MCH RBC QN AUTO: 31.3 PG (ref 26–34)
MCHC RBC AUTO-ENTMCNC: 35 G/DL (ref 32–36.5)
MCV RBC AUTO: 89.3 FL (ref 78–100)
MONOCYTES # BLD: 0.4 K/MCL (ref 0.3–0.9)
MONOCYTES NFR BLD: 10 %
NEUTROPHILS # BLD: 2.4 K/MCL (ref 1.8–7.7)
NEUTROPHILS NFR BLD: 62 %
NRBC BLD MANUAL-RTO: 0 /100 WBC
NT-PROBNP SERPL-MCNC: 891 PG/ML
PLATELET # BLD: 163 K/MCL (ref 140–450)
POTASSIUM SERPL-SCNC: 3.7 MMOL/L (ref 3.4–5.1)
PROTHROMBIN TIME: 10.7 SEC (ref 9.7–11.8)
RBC # BLD: 3.84 MIL/MCL (ref 4.5–5.9)
SARS-COV-2 RNA RESP QL NAA+PROBE: NOT DETECTED
SERVICE CMNT-IMP: NORMAL
SODIUM SERPL-SCNC: 131 MMOL/L (ref 135–145)
SPECIMEN SOURCE: NORMAL
TROPONIN I SERPL HS-MCNC: <0.02 NG/ML
WBC # BLD: 3.9 K/MCL (ref 4.2–11)

## 2020-07-10 PROCEDURE — 80048 BASIC METABOLIC PNL TOTAL CA: CPT

## 2020-07-10 PROCEDURE — 71275 CT ANGIOGRAPHY CHEST: CPT

## 2020-07-10 PROCEDURE — 85610 PROTHROMBIN TIME: CPT

## 2020-07-10 PROCEDURE — G0378 HOSPITAL OBSERVATION PER HR: HCPCS

## 2020-07-10 PROCEDURE — 99285 EMERGENCY DEPT VISIT HI MDM: CPT

## 2020-07-10 PROCEDURE — C9803 HOPD COVID-19 SPEC COLLECT: HCPCS

## 2020-07-10 PROCEDURE — 83880 ASSAY OF NATRIURETIC PEPTIDE: CPT

## 2020-07-10 PROCEDURE — 85025 COMPLETE CBC W/AUTO DIFF WBC: CPT

## 2020-07-10 PROCEDURE — 85730 THROMBOPLASTIN TIME PARTIAL: CPT

## 2020-07-10 PROCEDURE — 87635 SARS-COV-2 COVID-19 AMP PRB: CPT

## 2020-07-10 PROCEDURE — 84484 ASSAY OF TROPONIN QUANT: CPT

## 2020-07-10 PROCEDURE — 93005 ELECTROCARDIOGRAM TRACING: CPT | Performed by: EMERGENCY MEDICINE

## 2020-07-10 PROCEDURE — 10002805 HB CONTRAST AGENT: Performed by: EMERGENCY MEDICINE

## 2020-07-10 RX ADMIN — IOHEXOL 100 ML: 350 INJECTION, SOLUTION INTRAVENOUS at 22:30

## 2020-07-11 ENCOUNTER — APPOINTMENT (OUTPATIENT)
Dept: CARDIOLOGY | Age: 80
End: 2020-07-11
Attending: INTERNAL MEDICINE

## 2020-07-11 ENCOUNTER — APPOINTMENT (OUTPATIENT)
Dept: GENERAL RADIOLOGY | Age: 80
End: 2020-07-11
Attending: HOSPITALIST

## 2020-07-11 LAB
ANION GAP SERPL CALC-SCNC: 8 MMOL/L (ref 10–20)
ATRIAL RATE (BPM): 55
BASOPHILS # BLD: 0 K/MCL (ref 0–0.3)
BASOPHILS NFR BLD: 0 %
BUN SERPL-MCNC: 14 MG/DL (ref 6–20)
BUN/CREAT SERPL: 12 (ref 7–25)
CALCIUM SERPL-MCNC: 9.1 MG/DL (ref 8.4–10.2)
CHLORIDE SERPL-SCNC: 105 MMOL/L (ref 98–107)
CO2 SERPL-SCNC: 27 MMOL/L (ref 21–32)
CREAT SERPL-MCNC: 1.15 MG/DL (ref 0.67–1.17)
DIFFERENTIAL METHOD BLD: ABNORMAL
EOSINOPHIL # BLD: 0.1 K/MCL (ref 0.1–0.5)
EOSINOPHIL NFR BLD: 3 %
ERYTHROCYTE [DISTWIDTH] IN BLOOD: 13.7 % (ref 11–15)
GLUCOSE SERPL-MCNC: 90 MG/DL (ref 65–99)
HCT VFR BLD CALC: 34.3 % (ref 39–51)
HGB BLD-MCNC: 12 G/DL (ref 13–17)
IMM GRANULOCYTES # BLD AUTO: 0 K/MCL (ref 0–0.2)
IMM GRANULOCYTES NFR BLD: 1 %
LYMPHOCYTES # BLD: 1.2 K/MCL (ref 1–4)
LYMPHOCYTES NFR BLD: 33 %
MAGNESIUM SERPL-MCNC: 2.3 MG/DL (ref 1.7–2.4)
MCH RBC QN AUTO: 30.8 PG (ref 26–34)
MCHC RBC AUTO-ENTMCNC: 35 G/DL (ref 32–36.5)
MCV RBC AUTO: 87.9 FL (ref 78–100)
MONOCYTES # BLD: 0.5 K/MCL (ref 0.3–0.9)
MONOCYTES NFR BLD: 14 %
NEUTROPHILS # BLD: 1.8 K/MCL (ref 1.8–7.7)
NEUTROPHILS NFR BLD: 49 %
NRBC BLD MANUAL-RTO: 0 /100 WBC
P AXIS (DEGREES): 63
PLATELET # BLD: 142 K/MCL (ref 140–450)
POTASSIUM SERPL-SCNC: 4.2 MMOL/L (ref 3.4–5.1)
PR-INTERVAL (MSEC): 174
QRS-INTERVAL (MSEC): 90
QT-INTERVAL (MSEC): 454
QTC: 434
R AXIS (DEGREES): 36
RBC # BLD: 3.9 MIL/MCL (ref 4.5–5.9)
REPORT TEXT: NORMAL
SODIUM SERPL-SCNC: 136 MMOL/L (ref 135–145)
T AXIS (DEGREES): 22
TROPONIN I SERPL HS-MCNC: <0.02 NG/ML
TROPONIN I SERPL HS-MCNC: <0.02 NG/ML
VENTRICULAR RATE EKG/MIN (BPM): 55
WBC # BLD: 3.6 K/MCL (ref 4.2–11)

## 2020-07-11 PROCEDURE — 84484 ASSAY OF TROPONIN QUANT: CPT

## 2020-07-11 PROCEDURE — G0378 HOSPITAL OBSERVATION PER HR: HCPCS

## 2020-07-11 PROCEDURE — 96376 TX/PRO/DX INJ SAME DRUG ADON: CPT

## 2020-07-11 PROCEDURE — 10002803 HB RX 637: Performed by: INTERNAL MEDICINE

## 2020-07-11 PROCEDURE — 93016 CV STRESS TEST SUPVJ ONLY: CPT | Performed by: INTERNAL MEDICINE

## 2020-07-11 PROCEDURE — 10002801 HB RX 250 W/O HCPCS: Performed by: INTERNAL MEDICINE

## 2020-07-11 PROCEDURE — 93017 CV STRESS TEST TRACING ONLY: CPT

## 2020-07-11 PROCEDURE — 85025 COMPLETE CBC W/AUTO DIFF WBC: CPT

## 2020-07-11 PROCEDURE — 78452 HT MUSCLE IMAGE SPECT MULT: CPT

## 2020-07-11 PROCEDURE — 93005 ELECTROCARDIOGRAM TRACING: CPT | Performed by: INTERNAL MEDICINE

## 2020-07-11 PROCEDURE — 10004651 HB RX, NO CHARGE ITEM: Performed by: INTERNAL MEDICINE

## 2020-07-11 PROCEDURE — 10002800 HB RX 250 W HCPCS: Performed by: INTERNAL MEDICINE

## 2020-07-11 PROCEDURE — 93018 CV STRESS TEST I&R ONLY: CPT | Performed by: INTERNAL MEDICINE

## 2020-07-11 PROCEDURE — A9500 TC99M SESTAMIBI: HCPCS | Performed by: INTERNAL MEDICINE

## 2020-07-11 PROCEDURE — 96374 THER/PROPH/DIAG INJ IV PUSH: CPT

## 2020-07-11 PROCEDURE — 80048 BASIC METABOLIC PNL TOTAL CA: CPT

## 2020-07-11 PROCEDURE — 10002800 HB RX 250 W HCPCS: Performed by: NURSE PRACTITIONER

## 2020-07-11 PROCEDURE — 83735 ASSAY OF MAGNESIUM: CPT

## 2020-07-11 PROCEDURE — 96375 TX/PRO/DX INJ NEW DRUG ADDON: CPT

## 2020-07-11 PROCEDURE — 10002803 HB RX 637: Performed by: HOSPITALIST

## 2020-07-11 PROCEDURE — 36415 COLL VENOUS BLD VENIPUNCTURE: CPT

## 2020-07-11 PROCEDURE — 96372 THER/PROPH/DIAG INJ SC/IM: CPT

## 2020-07-11 PROCEDURE — 78452 HT MUSCLE IMAGE SPECT MULT: CPT | Performed by: INTERNAL MEDICINE

## 2020-07-11 PROCEDURE — 10006150 HB RX 343: Performed by: INTERNAL MEDICINE

## 2020-07-11 PROCEDURE — 73630 X-RAY EXAM OF FOOT: CPT

## 2020-07-11 RX ORDER — AMLODIPINE BESYLATE 5 MG/1
5 TABLET ORAL ONCE
Status: COMPLETED | OUTPATIENT
Start: 2020-07-11 | End: 2020-07-11

## 2020-07-11 RX ORDER — CLOPIDOGREL BISULFATE 75 MG/1
75 TABLET ORAL DAILY
Status: DISCONTINUED | OUTPATIENT
Start: 2020-07-11 | End: 2020-07-12 | Stop reason: HOSPADM

## 2020-07-11 RX ORDER — PANTOPRAZOLE SODIUM 40 MG/1
40 TABLET, DELAYED RELEASE ORAL 2 TIMES DAILY
COMMUNITY

## 2020-07-11 RX ORDER — METOPROLOL SUCCINATE 25 MG/1
25 TABLET, EXTENDED RELEASE ORAL DAILY
Status: DISCONTINUED | OUTPATIENT
Start: 2020-07-11 | End: 2020-07-12 | Stop reason: HOSPADM

## 2020-07-11 RX ORDER — ASPIRIN 81 MG/1
81 TABLET, CHEWABLE ORAL DAILY
Status: DISCONTINUED | OUTPATIENT
Start: 2020-07-11 | End: 2020-07-12 | Stop reason: HOSPADM

## 2020-07-11 RX ORDER — HYDRALAZINE HYDROCHLORIDE 25 MG/1
25 TABLET, FILM COATED ORAL DAILY PRN
Status: ON HOLD | COMMUNITY
End: 2023-03-17 | Stop reason: SDUPTHER

## 2020-07-11 RX ORDER — EZETIMIBE 10 MG/1
10 TABLET ORAL DAILY
Status: DISCONTINUED | OUTPATIENT
Start: 2020-07-11 | End: 2020-07-12 | Stop reason: HOSPADM

## 2020-07-11 RX ORDER — ENALAPRILAT 1.25 MG/ML
1.25 INJECTION INTRAVENOUS EVERY 6 HOURS SCHEDULED
Status: DISPENSED | OUTPATIENT
Start: 2020-07-11 | End: 2020-07-12

## 2020-07-11 RX ORDER — HYDRALAZINE HYDROCHLORIDE 25 MG/1
25 TABLET, FILM COATED ORAL EVERY 8 HOURS PRN
Status: DISCONTINUED | OUTPATIENT
Start: 2020-07-11 | End: 2020-07-12 | Stop reason: HOSPADM

## 2020-07-11 RX ORDER — 0.9 % SODIUM CHLORIDE 0.9 %
2 VIAL (ML) INJECTION EVERY 12 HOURS SCHEDULED
Status: DISCONTINUED | OUTPATIENT
Start: 2020-07-11 | End: 2020-07-12 | Stop reason: HOSPADM

## 2020-07-11 RX ORDER — REGADENOSON 0.08 MG/ML
0.4 INJECTION, SOLUTION INTRAVENOUS ONCE
Status: COMPLETED | OUTPATIENT
Start: 2020-07-11 | End: 2020-07-11

## 2020-07-11 RX ORDER — HEPARIN SODIUM 5000 [USP'U]/ML
5000 INJECTION, SOLUTION INTRAVENOUS; SUBCUTANEOUS EVERY 8 HOURS SCHEDULED
Status: DISCONTINUED | OUTPATIENT
Start: 2020-07-11 | End: 2020-07-12 | Stop reason: HOSPADM

## 2020-07-11 RX ORDER — NITROGLYCERIN 0.4 MG/1
0.4 TABLET SUBLINGUAL EVERY 5 MIN PRN
COMMUNITY

## 2020-07-11 RX ORDER — TETRAKIS(2-METHOXYISOBUTYLISOCYANIDE)COPPER(I) TETRAFLUOROBORATE 1 MG/ML
8 INJECTION, POWDER, LYOPHILIZED, FOR SOLUTION INTRAVENOUS ONCE
Status: COMPLETED | OUTPATIENT
Start: 2020-07-11 | End: 2020-07-11

## 2020-07-11 RX ORDER — TETRAKIS(2-METHOXYISOBUTYLISOCYANIDE)COPPER(I) TETRAFLUOROBORATE 1 MG/ML
24 INJECTION, POWDER, LYOPHILIZED, FOR SOLUTION INTRAVENOUS ONCE
Status: COMPLETED | OUTPATIENT
Start: 2020-07-11 | End: 2020-07-11

## 2020-07-11 RX ORDER — HYDRALAZINE HYDROCHLORIDE 20 MG/ML
10 INJECTION INTRAMUSCULAR; INTRAVENOUS EVERY 6 HOURS PRN
Status: DISCONTINUED | OUTPATIENT
Start: 2020-07-11 | End: 2020-07-12 | Stop reason: HOSPADM

## 2020-07-11 RX ORDER — PANTOPRAZOLE SODIUM 40 MG/1
40 TABLET, DELAYED RELEASE ORAL DAILY
Status: DISCONTINUED | OUTPATIENT
Start: 2020-07-11 | End: 2020-07-12 | Stop reason: HOSPADM

## 2020-07-11 RX ADMIN — HYDRALAZINE HYDROCHLORIDE 10 MG: 20 INJECTION INTRAMUSCULAR; INTRAVENOUS at 07:05

## 2020-07-11 RX ADMIN — SODIUM CHLORIDE, PRESERVATIVE FREE 2 ML: 5 INJECTION INTRAVENOUS at 08:05

## 2020-07-11 RX ADMIN — TETRAKIS(2-METHOXYISOBUTYLISOCYANIDE)COPPER(I) TETRAFLUOROBORATE 9.5 MILLICURIE: 1 INJECTION, POWDER, LYOPHILIZED, FOR SOLUTION INTRAVENOUS at 08:05

## 2020-07-11 RX ADMIN — AMLODIPINE BESYLATE 5 MG: 5 TABLET ORAL at 02:59

## 2020-07-11 RX ADMIN — TETRAKIS(2-METHOXYISOBUTYLISOCYANIDE)COPPER(I) TETRAFLUOROBORATE 30.6 MILLICURIE: 1 INJECTION, POWDER, LYOPHILIZED, FOR SOLUTION INTRAVENOUS at 10:05

## 2020-07-11 RX ADMIN — METOPROLOL SUCCINATE 25 MG: 25 TABLET, EXTENDED RELEASE ORAL at 08:05

## 2020-07-11 RX ADMIN — REGADENOSON 0.4 MG: 0.08 INJECTION, SOLUTION INTRAVENOUS at 10:11

## 2020-07-11 RX ADMIN — ASPIRIN 81 MG 81 MG: 81 TABLET ORAL at 08:05

## 2020-07-11 RX ADMIN — SODIUM CHLORIDE, PRESERVATIVE FREE 2 ML: 5 INJECTION INTRAVENOUS at 21:12

## 2020-07-11 RX ADMIN — ENALAPRILAT 1.25 MG: 1.25 INJECTION INTRAVENOUS at 18:43

## 2020-07-11 RX ADMIN — PANTOPRAZOLE SODIUM 40 MG: 40 TABLET, DELAYED RELEASE ORAL at 18:49

## 2020-07-11 RX ADMIN — ENALAPRILAT 1.25 MG: 1.25 INJECTION INTRAVENOUS at 08:05

## 2020-07-11 RX ADMIN — EZETIMIBE 10 MG: 10 TABLET ORAL at 08:09

## 2020-07-11 RX ADMIN — CLOPIDOGREL BISULFATE 75 MG: 75 TABLET, FILM COATED ORAL at 08:05

## 2020-07-11 RX ADMIN — HEPARIN SODIUM 5000 UNITS: 5000 INJECTION INTRAVENOUS; SUBCUTANEOUS at 21:12

## 2020-07-11 SDOH — HEALTH STABILITY: MENTAL HEALTH: HOW OFTEN DO YOU HAVE A DRINK CONTAINING ALCOHOL?: NEVER

## 2020-07-11 ASSESSMENT — LIFESTYLE VARIABLES
HOW OFTEN DO YOU HAVE A DRINK CONTAINING ALCOHOL: 2 TO 4 TIMES PER MONTH
HOW OFTEN DO YOU HAVE 6 OR MORE DRINKS ON ONE OCCASION: NEVER
HOW MANY STANDARD DRINKS CONTAINING ALCOHOL DO YOU HAVE ON A TYPICAL DAY: 0,1 OR 2
AUDIT-C TOTAL SCORE: 2
ALCOHOL_USE_STATUS: NO OR LOW RISK WITH VALIDATED TOOL

## 2020-07-11 ASSESSMENT — PAIN SCALES - GENERAL
PAINLEVEL_OUTOF10: 0
PAINLEVEL_OUTOF10: 0
PAINLEVEL_OUTOF10: 5

## 2020-07-11 ASSESSMENT — ENCOUNTER SYMPTOMS
CHEST TIGHTNESS: 1
CHILLS: 0
NUMBNESS: 0
HALLUCINATIONS: 0
HEADACHES: 0
COUGH: 0
NAUSEA: 0
DIARRHEA: 0
ABDOMINAL PAIN: 0
SINUS PRESSURE: 0
FATIGUE: 0
DIZZINESS: 0
SHORTNESS OF BREATH: 0
FEVER: 0
WEAKNESS: 0
CONFUSION: 0
WHEEZING: 0
EYE PAIN: 0
ABDOMINAL DISTENTION: 0
SORE THROAT: 0
VOMITING: 0

## 2020-07-11 ASSESSMENT — ACTIVITIES OF DAILY LIVING (ADL)
CHRONIC_PAIN_PRESENT: NO
ADL_SHORT_OF_BREATH: NO
RECENT_DECLINE_ADL: NO
ADL_BEFORE_ADMISSION: INDEPENDENT
ADL_SCORE: 12

## 2020-07-11 ASSESSMENT — COGNITIVE AND FUNCTIONAL STATUS - GENERAL
DO YOU HAVE DIFFICULTY DRESSING OR BATHING: NO
DO YOU HAVE SERIOUS DIFFICULTY WALKING OR CLIMBING STAIRS: NO
BECAUSE OF A PHYSICAL, MENTAL, OR EMOTIONAL CONDITION, DO YOU HAVE DIFFICULTY DOING ERRANDS ALONE: NO
ARE YOU DEAF OR DO YOU HAVE SERIOUS DIFFICULTY  HEARING: NO
ARE YOU BLIND OR DO YOU HAVE SERIOUS DIFFICULTY SEEING, EVEN WHEN WEARING GLASSES: NO
BECAUSE OF A PHYSICAL, MENTAL, OR EMOTIONAL CONDITION, DO YOU HAVE SERIOUS DIFFICULTY CONCENTRATING, REMEMBERING OR MAKING DECISIONS: NO

## 2020-07-11 ASSESSMENT — PATIENT HEALTH QUESTIONNAIRE - PHQ9: IS PATIENT ABLE TO COMPLETE PHQ2 OR PHQ9: NO, DEFER TO LATER TIME

## 2020-07-12 VITALS
TEMPERATURE: 98.2 F | HEIGHT: 72 IN | HEART RATE: 57 BPM | WEIGHT: 160.72 LBS | OXYGEN SATURATION: 99 % | BODY MASS INDEX: 21.77 KG/M2 | DIASTOLIC BLOOD PRESSURE: 51 MMHG | RESPIRATION RATE: 16 BRPM | SYSTOLIC BLOOD PRESSURE: 105 MMHG

## 2020-07-12 PROCEDURE — G0378 HOSPITAL OBSERVATION PER HR: HCPCS

## 2020-07-12 PROCEDURE — 10002803 HB RX 637: Performed by: HOSPITALIST

## 2020-07-12 PROCEDURE — 10002803 HB RX 637: Performed by: INTERNAL MEDICINE

## 2020-07-12 PROCEDURE — 96372 THER/PROPH/DIAG INJ SC/IM: CPT

## 2020-07-12 PROCEDURE — 10004651 HB RX, NO CHARGE ITEM: Performed by: INTERNAL MEDICINE

## 2020-07-12 PROCEDURE — 10002800 HB RX 250 W HCPCS: Performed by: INTERNAL MEDICINE

## 2020-07-12 RX ORDER — AMLODIPINE BESYLATE 5 MG/1
5 TABLET ORAL DAILY
Qty: 30 TABLET | Refills: 0 | Status: SHIPPED | OUTPATIENT
Start: 2020-07-13 | End: 2021-04-18

## 2020-07-12 RX ORDER — AMLODIPINE BESYLATE 5 MG/1
5 TABLET ORAL DAILY
Status: DISCONTINUED | OUTPATIENT
Start: 2020-07-12 | End: 2020-07-12 | Stop reason: HOSPADM

## 2020-07-12 RX ADMIN — PANTOPRAZOLE SODIUM 40 MG: 40 TABLET, DELAYED RELEASE ORAL at 08:15

## 2020-07-12 RX ADMIN — ASPIRIN 81 MG 81 MG: 81 TABLET ORAL at 08:15

## 2020-07-12 RX ADMIN — HEPARIN SODIUM 5000 UNITS: 5000 INJECTION INTRAVENOUS; SUBCUTANEOUS at 05:12

## 2020-07-12 RX ADMIN — EZETIMIBE 10 MG: 10 TABLET ORAL at 08:15

## 2020-07-12 RX ADMIN — CLOPIDOGREL BISULFATE 75 MG: 75 TABLET, FILM COATED ORAL at 08:15

## 2020-07-12 RX ADMIN — SODIUM CHLORIDE, PRESERVATIVE FREE 2 ML: 5 INJECTION INTRAVENOUS at 08:15

## 2020-07-12 ASSESSMENT — PAIN SCALES - GENERAL: PAINLEVEL_OUTOF10: 0

## 2020-07-27 PROBLEM — G31.9 BRAIN ATROPHY (HCC): Status: ACTIVE | Noted: 2020-07-27

## 2020-07-27 PROBLEM — G31.9 BRAIN ATROPHY: Status: ACTIVE | Noted: 2020-07-27

## 2020-09-14 ENCOUNTER — HOSPITAL ENCOUNTER (OUTPATIENT)
Age: 80
Setting detail: OBSERVATION
Discharge: HOME | End: 2020-09-16
Attending: EMERGENCY MEDICINE | Admitting: HOSPITALIST

## 2020-09-14 ENCOUNTER — APPOINTMENT (OUTPATIENT)
Dept: MRI IMAGING | Age: 80
End: 2020-09-14
Attending: PSYCHIATRY & NEUROLOGY

## 2020-09-14 ENCOUNTER — APPOINTMENT (OUTPATIENT)
Dept: GENERAL RADIOLOGY | Age: 80
End: 2020-09-14
Attending: EMERGENCY MEDICINE

## 2020-09-14 DIAGNOSIS — R07.9 CHEST PAIN, UNSPECIFIED TYPE: Primary | ICD-10-CM

## 2020-09-14 LAB
ALBUMIN SERPL-MCNC: 4.1 G/DL (ref 3.6–5.1)
ALBUMIN/GLOB SERPL: 1.2 {RATIO} (ref 1–2.4)
ALP SERPL-CCNC: 70 UNITS/L (ref 45–117)
ALT SERPL-CCNC: 15 UNITS/L
ANION GAP SERPL CALC-SCNC: 10 MMOL/L (ref 10–20)
AST SERPL-CCNC: 11 UNITS/L
ATRIAL RATE (BPM): 61
BASOPHILS # BLD: 0 K/MCL (ref 0–0.3)
BASOPHILS NFR BLD: 1 %
BILIRUB SERPL-MCNC: 0.4 MG/DL (ref 0.2–1)
BUN SERPL-MCNC: 15 MG/DL (ref 6–20)
BUN/CREAT SERPL: 11 (ref 7–25)
CALCIUM SERPL-MCNC: 9.4 MG/DL (ref 8.4–10.2)
CHLORIDE SERPL-SCNC: 100 MMOL/L (ref 98–107)
CO2 SERPL-SCNC: 27 MMOL/L (ref 21–32)
CREAT SERPL-MCNC: 1.34 MG/DL (ref 0.67–1.17)
DIFFERENTIAL METHOD BLD: ABNORMAL
EOSINOPHIL # BLD: 0.2 K/MCL (ref 0.1–0.5)
EOSINOPHIL NFR BLD: 4 %
ERYTHROCYTE [DISTWIDTH] IN BLOOD: 12.7 % (ref 11–15)
GLOBULIN SER-MCNC: 3.4 G/DL (ref 2–4)
GLUCOSE SERPL-MCNC: 104 MG/DL (ref 65–99)
HCT VFR BLD CALC: 36.2 % (ref 39–51)
HGB BLD-MCNC: 12.1 G/DL (ref 13–17)
IMM GRANULOCYTES # BLD AUTO: 0 K/MCL (ref 0–0.2)
IMM GRANULOCYTES NFR BLD: 1 %
LYMPHOCYTES # BLD: 0.9 K/MCL (ref 1–4)
LYMPHOCYTES NFR BLD: 22 %
MCH RBC QN AUTO: 30.6 PG (ref 26–34)
MCHC RBC AUTO-ENTMCNC: 33.4 G/DL (ref 32–36.5)
MCV RBC AUTO: 91.4 FL (ref 78–100)
MONOCYTES # BLD: 0.5 K/MCL (ref 0.3–0.9)
MONOCYTES NFR BLD: 13 %
NEUTROPHILS # BLD: 2.4 K/MCL (ref 1.8–7.7)
NEUTROPHILS NFR BLD: 59 %
NRBC BLD MANUAL-RTO: 0 /100 WBC
P AXIS (DEGREES): 42
PLATELET # BLD: 140 K/MCL (ref 140–450)
POTASSIUM SERPL-SCNC: 4.5 MMOL/L (ref 3.4–5.1)
PR-INTERVAL (MSEC): 164
PROT SERPL-MCNC: 7.5 G/DL (ref 6.4–8.2)
QRS-INTERVAL (MSEC): 98
QT-INTERVAL (MSEC): 428
QTC: 431
R AXIS (DEGREES): 44
RBC # BLD: 3.96 MIL/MCL (ref 4.5–5.9)
REPORT TEXT: NORMAL
SARS-COV-2 RNA RESP QL NAA+PROBE: NOT DETECTED
SERVICE CMNT-IMP: NORMAL
SODIUM SERPL-SCNC: 132 MMOL/L (ref 135–145)
SPECIMEN SOURCE: NORMAL
T AXIS (DEGREES): 19
TROPONIN I SERPL HS-MCNC: <0.02 NG/ML
VENTRICULAR RATE EKG/MIN (BPM): 61
WBC # BLD: 4.1 K/MCL (ref 4.2–11)

## 2020-09-14 PROCEDURE — 96372 THER/PROPH/DIAG INJ SC/IM: CPT

## 2020-09-14 PROCEDURE — 10002800 HB RX 250 W HCPCS: Performed by: HOSPITALIST

## 2020-09-14 PROCEDURE — 85025 COMPLETE CBC W/AUTO DIFF WBC: CPT

## 2020-09-14 PROCEDURE — 70549 MR ANGIOGRAPH NECK W/O&W/DYE: CPT

## 2020-09-14 PROCEDURE — 93005 ELECTROCARDIOGRAM TRACING: CPT | Performed by: EMERGENCY MEDICINE

## 2020-09-14 PROCEDURE — 10004651 HB RX, NO CHARGE ITEM: Performed by: HOSPITALIST

## 2020-09-14 PROCEDURE — 70544 MR ANGIOGRAPHY HEAD W/O DYE: CPT

## 2020-09-14 PROCEDURE — 93005 ELECTROCARDIOGRAM TRACING: CPT | Performed by: INTERNAL MEDICINE

## 2020-09-14 PROCEDURE — 84484 ASSAY OF TROPONIN QUANT: CPT

## 2020-09-14 PROCEDURE — 71045 X-RAY EXAM CHEST 1 VIEW: CPT

## 2020-09-14 PROCEDURE — 10002807 HB RX 258: Performed by: HOSPITALIST

## 2020-09-14 PROCEDURE — 99285 EMERGENCY DEPT VISIT HI MDM: CPT

## 2020-09-14 PROCEDURE — 10002805 HB CONTRAST AGENT: Performed by: PSYCHIATRY & NEUROLOGY

## 2020-09-14 PROCEDURE — G0378 HOSPITAL OBSERVATION PER HR: HCPCS

## 2020-09-14 PROCEDURE — A9585 GADOBUTROL INJECTION: HCPCS | Performed by: PSYCHIATRY & NEUROLOGY

## 2020-09-14 PROCEDURE — C9803 HOPD COVID-19 SPEC COLLECT: HCPCS

## 2020-09-14 PROCEDURE — 70553 MRI BRAIN STEM W/O & W/DYE: CPT

## 2020-09-14 PROCEDURE — 36415 COLL VENOUS BLD VENIPUNCTURE: CPT

## 2020-09-14 PROCEDURE — 80053 COMPREHEN METABOLIC PANEL: CPT

## 2020-09-14 PROCEDURE — 87635 SARS-COV-2 COVID-19 AMP PRB: CPT

## 2020-09-14 PROCEDURE — 10002803 HB RX 637: Performed by: HOSPITALIST

## 2020-09-14 PROCEDURE — 10002803 HB RX 637: Performed by: EMERGENCY MEDICINE

## 2020-09-14 RX ORDER — ENOXAPARIN SODIUM 100 MG/ML
40 INJECTION SUBCUTANEOUS DAILY
Status: DISCONTINUED | OUTPATIENT
Start: 2020-09-14 | End: 2020-09-16 | Stop reason: HOSPADM

## 2020-09-14 RX ORDER — AMLODIPINE BESYLATE 5 MG/1
5 TABLET ORAL DAILY
Status: DISCONTINUED | OUTPATIENT
Start: 2020-09-15 | End: 2020-09-15

## 2020-09-14 RX ORDER — EZETIMIBE 10 MG/1
10 TABLET ORAL DAILY
Status: DISCONTINUED | OUTPATIENT
Start: 2020-09-14 | End: 2020-09-16 | Stop reason: HOSPADM

## 2020-09-14 RX ORDER — ONDANSETRON 2 MG/ML
4 INJECTION INTRAMUSCULAR; INTRAVENOUS EVERY 6 HOURS PRN
Status: DISCONTINUED | OUTPATIENT
Start: 2020-09-14 | End: 2020-09-16 | Stop reason: HOSPADM

## 2020-09-14 RX ORDER — HYDRALAZINE HYDROCHLORIDE 20 MG/ML
10 INJECTION INTRAMUSCULAR; INTRAVENOUS EVERY 4 HOURS PRN
Status: DISCONTINUED | OUTPATIENT
Start: 2020-09-14 | End: 2020-09-16 | Stop reason: HOSPADM

## 2020-09-14 RX ORDER — SODIUM CHLORIDE 9 MG/ML
INJECTION, SOLUTION INTRAVENOUS CONTINUOUS
Status: DISCONTINUED | OUTPATIENT
Start: 2020-09-14 | End: 2020-09-15

## 2020-09-14 RX ORDER — PANTOPRAZOLE SODIUM 40 MG/1
40 TABLET, DELAYED RELEASE ORAL
Status: DISCONTINUED | OUTPATIENT
Start: 2020-09-14 | End: 2020-09-16 | Stop reason: HOSPADM

## 2020-09-14 RX ORDER — CLOPIDOGREL BISULFATE 75 MG/1
75 TABLET ORAL DAILY
Status: DISCONTINUED | OUTPATIENT
Start: 2020-09-14 | End: 2020-09-15

## 2020-09-14 RX ORDER — METOPROLOL SUCCINATE 25 MG/1
25 TABLET, EXTENDED RELEASE ORAL ONCE
Status: COMPLETED | OUTPATIENT
Start: 2020-09-14 | End: 2020-09-14

## 2020-09-14 RX ORDER — HYDRALAZINE HYDROCHLORIDE 25 MG/1
25 TABLET, FILM COATED ORAL DAILY PRN
Status: DISCONTINUED | OUTPATIENT
Start: 2020-09-15 | End: 2020-09-14

## 2020-09-14 RX ORDER — GADOBUTROL 604.72 MG/ML
7.5 INJECTION INTRAVENOUS ONCE
Status: COMPLETED | OUTPATIENT
Start: 2020-09-14 | End: 2020-09-14

## 2020-09-14 RX ORDER — HYDRALAZINE HYDROCHLORIDE 25 MG/1
25 TABLET, FILM COATED ORAL DAILY PRN
Status: DISCONTINUED | OUTPATIENT
Start: 2020-09-14 | End: 2020-09-16 | Stop reason: HOSPADM

## 2020-09-14 RX ORDER — AMLODIPINE BESYLATE 5 MG/1
5 TABLET ORAL ONCE
Status: COMPLETED | OUTPATIENT
Start: 2020-09-14 | End: 2020-09-14

## 2020-09-14 RX ORDER — NITROGLYCERIN 0.4 MG/1
0.4 TABLET SUBLINGUAL EVERY 5 MIN PRN
Status: DISCONTINUED | OUTPATIENT
Start: 2020-09-14 | End: 2020-09-16 | Stop reason: HOSPADM

## 2020-09-14 RX ORDER — ACETAMINOPHEN 325 MG/1
650 TABLET ORAL EVERY 4 HOURS PRN
Status: DISCONTINUED | OUTPATIENT
Start: 2020-09-14 | End: 2020-09-16 | Stop reason: HOSPADM

## 2020-09-14 RX ADMIN — SODIUM CHLORIDE: 0.9 INJECTION, SOLUTION INTRAVENOUS at 11:39

## 2020-09-14 RX ADMIN — NITROGLYCERIN 0.4 MG: 0.4 TABLET SUBLINGUAL at 14:56

## 2020-09-14 RX ADMIN — ASPIRIN 81 MG: 81 TABLET, COATED ORAL at 14:58

## 2020-09-14 RX ADMIN — AMLODIPINE BESYLATE 5 MG: 5 TABLET ORAL at 06:28

## 2020-09-14 RX ADMIN — METOPROLOL SUCCINATE 25 MG: 25 TABLET, EXTENDED RELEASE ORAL at 06:58

## 2020-09-14 RX ADMIN — GADOBUTROL 7.5 ML: 604.72 INJECTION INTRAVENOUS at 14:30

## 2020-09-14 RX ADMIN — ENOXAPARIN SODIUM 40 MG: 40 INJECTION SUBCUTANEOUS at 19:40

## 2020-09-14 RX ADMIN — EZETIMIBE 10 MG: 10 TABLET ORAL at 14:58

## 2020-09-14 RX ADMIN — HYDRALAZINE HYDROCHLORIDE 25 MG: 25 TABLET, FILM COATED ORAL at 14:58

## 2020-09-14 RX ADMIN — CLOPIDOGREL BISULFATE 75 MG: 75 TABLET, FILM COATED ORAL at 14:58

## 2020-09-14 SDOH — HEALTH STABILITY: MENTAL HEALTH: HOW OFTEN DO YOU HAVE A DRINK CONTAINING ALCOHOL?: NEVER

## 2020-09-14 ASSESSMENT — COGNITIVE AND FUNCTIONAL STATUS - GENERAL
BECAUSE OF A PHYSICAL, MENTAL, OR EMOTIONAL CONDITION, DO YOU HAVE DIFFICULTY DOING ERRANDS ALONE: NO
DO YOU HAVE SERIOUS DIFFICULTY WALKING OR CLIMBING STAIRS: NO
ARE YOU BLIND OR DO YOU HAVE SERIOUS DIFFICULTY SEEING, EVEN WHEN WEARING GLASSES: NO
DO YOU HAVE DIFFICULTY DRESSING OR BATHING: NO
ARE YOU BLIND OR DO YOU HAVE SERIOUS DIFFICULTY SEEING, EVEN WHEN WEARING GLASSES: NO
ARE YOU DEAF OR DO YOU HAVE SERIOUS DIFFICULTY  HEARING: NO
ARE YOU DEAF OR DO YOU HAVE SERIOUS DIFFICULTY  HEARING: NO
BECAUSE OF A PHYSICAL, MENTAL, OR EMOTIONAL CONDITION, DO YOU HAVE SERIOUS DIFFICULTY CONCENTRATING, REMEMBERING OR MAKING DECISIONS: NO

## 2020-09-14 ASSESSMENT — ENCOUNTER SYMPTOMS
SINUS PRESSURE: 0
ADENOPATHY: 0
WEAKNESS: 0
FEVER: 0
HALLUCINATIONS: 0
CHILLS: 0
VOMITING: 0
PHOTOPHOBIA: 0
HEADACHES: 0
NAUSEA: 0
NUMBNESS: 1
DIARRHEA: 0
WHEEZING: 0
SORE THROAT: 0
RHINORRHEA: 0
ABDOMINAL PAIN: 0
FATIGUE: 0
SHORTNESS OF BREATH: 0

## 2020-09-14 ASSESSMENT — ACTIVITIES OF DAILY LIVING (ADL)
ADL_SHORT_OF_BREATH: NO
RECENT_DECLINE_ADL: NO
RECENT_DECLINE_ADL: NO
ADL_SCORE: 12
ADL_SHORT_OF_BREATH: NO
ADL_BEFORE_ADMISSION: INDEPENDENT
ADL_SCORE: 12
ADL_BEFORE_ADMISSION: INDEPENDENT
CHRONIC_PAIN_PRESENT: NO

## 2020-09-14 ASSESSMENT — LIFESTYLE VARIABLES
AUDIT-C TOTAL SCORE: 2
HOW MANY STANDARD DRINKS CONTAINING ALCOHOL DO YOU HAVE ON A TYPICAL DAY: 0,1 OR 2
HOW MANY STANDARD DRINKS CONTAINING ALCOHOL DO YOU HAVE ON A TYPICAL DAY: 0,1 OR 2
ALCOHOL_USE_STATUS: NO OR LOW RISK WITH VALIDATED TOOL
AUDIT-C TOTAL SCORE: 2
HOW OFTEN DO YOU HAVE A DRINK CONTAINING ALCOHOL: MONTHLY OR LESS
ALCOHOL_USE_STATUS: NO OR LOW RISK WITH VALIDATED TOOL
HOW OFTEN DO YOU HAVE A DRINK CONTAINING ALCOHOL: MONTHLY OR LESS
HOW OFTEN DO YOU HAVE 6 OR MORE DRINKS ON ONE OCCASION: LESS THAN MONTHLY
HOW OFTEN DO YOU HAVE 6 OR MORE DRINKS ON ONE OCCASION: LESS THAN MONTHLY

## 2020-09-14 ASSESSMENT — HEART SCORE
HISTORY: HIGHLY SUSPICIOUS
AGE: EQUAL OR GREATER THAN 65
EKG: NORMAL
RISK FACTORS: EQUAL OR GREATER  THAN 3 RISK FACTORS OR HISTORY OF ATHEROSCLEROTIC DISEASE
HEART SCORE: 6
TROPONIN: EQUAL OR LESS THAN NORMAL LIMIT

## 2020-09-14 ASSESSMENT — PAIN SCALES - GENERAL
PAINLEVEL_OUTOF10: 4
PAINLEVEL_OUTOF10: 0
PAINLEVEL_OUTOF10: 0

## 2020-09-14 ASSESSMENT — PAIN DESCRIPTION - PAIN TYPE: TYPE: ACUTE PAIN

## 2020-09-15 ENCOUNTER — APPOINTMENT (OUTPATIENT)
Dept: CARDIOLOGY | Age: 80
End: 2020-09-15
Attending: PSYCHIATRY & NEUROLOGY

## 2020-09-15 LAB
ANION GAP SERPL CALC-SCNC: 8 MMOL/L (ref 10–20)
ATRIAL RATE (BPM): 47
BUN SERPL-MCNC: 15 MG/DL (ref 6–20)
BUN/CREAT SERPL: 12 (ref 7–25)
CALCIUM SERPL-MCNC: 8.4 MG/DL (ref 8.4–10.2)
CHLORIDE SERPL-SCNC: 108 MMOL/L (ref 98–107)
CHOLEST SERPL-MCNC: 164 MG/DL
CHOLEST/HDLC SERPL: 5 {RATIO}
CO2 SERPL-SCNC: 26 MMOL/L (ref 21–32)
CREAT SERPL-MCNC: 1.24 MG/DL (ref 0.67–1.17)
GLUCOSE SERPL-MCNC: 77 MG/DL (ref 65–99)
HDLC SERPL-MCNC: 33 MG/DL
LDLC SERPL CALC-MCNC: 113 MG/DL
NONHDLC SERPL-MCNC: 131 MG/DL
P AXIS (DEGREES): 28
POTASSIUM SERPL-SCNC: 4.2 MMOL/L (ref 3.4–5.1)
PR-INTERVAL (MSEC): 156
QRS-INTERVAL (MSEC): 92
QT-INTERVAL (MSEC): 482
QTC: 426
R AXIS (DEGREES): 25
REPORT TEXT: NORMAL
SODIUM SERPL-SCNC: 138 MMOL/L (ref 135–145)
T AXIS (DEGREES): 18
TRIGL SERPL-MCNC: 88 MG/DL
VENTRICULAR RATE EKG/MIN (BPM): 47

## 2020-09-15 PROCEDURE — 10002805 HB CONTRAST AGENT: Performed by: INTERNAL MEDICINE

## 2020-09-15 PROCEDURE — C1894 INTRO/SHEATH, NON-LASER: HCPCS | Performed by: INTERNAL MEDICINE

## 2020-09-15 PROCEDURE — G0378 HOSPITAL OBSERVATION PER HR: HCPCS

## 2020-09-15 PROCEDURE — 99153 MOD SED SAME PHYS/QHP EA: CPT | Performed by: INTERNAL MEDICINE

## 2020-09-15 PROCEDURE — C1887 CATHETER, GUIDING: HCPCS | Performed by: INTERNAL MEDICINE

## 2020-09-15 PROCEDURE — 10006027 HB SUPPLY 278: Performed by: INTERNAL MEDICINE

## 2020-09-15 PROCEDURE — C1769 GUIDE WIRE: HCPCS | Performed by: INTERNAL MEDICINE

## 2020-09-15 PROCEDURE — 10004651 HB RX, NO CHARGE ITEM: Performed by: INTERNAL MEDICINE

## 2020-09-15 PROCEDURE — 97165 OT EVAL LOW COMPLEX 30 MIN: CPT

## 2020-09-15 PROCEDURE — 93306 TTE W/DOPPLER COMPLETE: CPT

## 2020-09-15 PROCEDURE — 10002801 HB RX 250 W/O HCPCS: Performed by: INTERNAL MEDICINE

## 2020-09-15 PROCEDURE — 10004651 HB RX, NO CHARGE ITEM: Performed by: HOSPITALIST

## 2020-09-15 PROCEDURE — 80061 LIPID PANEL: CPT

## 2020-09-15 PROCEDURE — 80048 BASIC METABOLIC PNL TOTAL CA: CPT

## 2020-09-15 PROCEDURE — 97530 THERAPEUTIC ACTIVITIES: CPT

## 2020-09-15 PROCEDURE — 10006023 HB SUPPLY 272: Performed by: INTERNAL MEDICINE

## 2020-09-15 PROCEDURE — 36415 COLL VENOUS BLD VENIPUNCTURE: CPT

## 2020-09-15 PROCEDURE — 10002803 HB RX 637: Performed by: HOSPITALIST

## 2020-09-15 PROCEDURE — 97162 PT EVAL MOD COMPLEX 30 MIN: CPT

## 2020-09-15 PROCEDURE — 99152 MOD SED SAME PHYS/QHP 5/>YRS: CPT | Performed by: INTERNAL MEDICINE

## 2020-09-15 PROCEDURE — 93459 L HRT ART/GRFT ANGIO: CPT | Performed by: INTERNAL MEDICINE

## 2020-09-15 PROCEDURE — C1760 CLOSURE DEV, VASC: HCPCS | Performed by: INTERNAL MEDICINE

## 2020-09-15 PROCEDURE — 10002803 HB RX 637: Performed by: INTERNAL MEDICINE

## 2020-09-15 PROCEDURE — 10002807 HB RX 258: Performed by: HOSPITALIST

## 2020-09-15 PROCEDURE — 92928 PRQ TCAT PLMT NTRAC ST 1 LES: CPT | Performed by: INTERNAL MEDICINE

## 2020-09-15 PROCEDURE — C1725 CATH, TRANSLUMIN NON-LASER: HCPCS | Performed by: INTERNAL MEDICINE

## 2020-09-15 PROCEDURE — C1874 STENT, COATED/COV W/DEL SYS: HCPCS | Performed by: INTERNAL MEDICINE

## 2020-09-15 PROCEDURE — 10002807 HB RX 258: Performed by: INTERNAL MEDICINE

## 2020-09-15 PROCEDURE — 10002800 HB RX 250 W HCPCS: Performed by: INTERNAL MEDICINE

## 2020-09-15 DEVICE — ANGIO-SEAL VIP VASCULAR CLOSURE DEVICE
Type: IMPLANTABLE DEVICE | Site: FEMORAL ARTERY | Status: FUNCTIONAL
Brand: ANGIO-SEAL

## 2020-09-15 DEVICE — STENT RONYX22515UX RESOLUTE ONYX 2.25X15
Type: IMPLANTABLE DEVICE | Site: CIRCUMFLEX CORONARY ARTERY | Status: FUNCTIONAL
Brand: RESOLUTE ONYX™

## 2020-09-15 RX ORDER — CLOPIDOGREL BISULFATE 75 MG/1
TABLET ORAL PRN
Status: DISCONTINUED | OUTPATIENT
Start: 2020-09-15 | End: 2020-09-15 | Stop reason: HOSPADM

## 2020-09-15 RX ORDER — 0.9 % SODIUM CHLORIDE 0.9 %
2 VIAL (ML) INJECTION EVERY 12 HOURS SCHEDULED
Status: CANCELLED | OUTPATIENT
Start: 2020-09-15

## 2020-09-15 RX ORDER — LIDOCAINE HYDROCHLORIDE 20 MG/ML
INJECTION, SOLUTION EPIDURAL; INFILTRATION; INTRACAUDAL; PERINEURAL PRN
Status: DISCONTINUED | OUTPATIENT
Start: 2020-09-15 | End: 2020-09-15 | Stop reason: HOSPADM

## 2020-09-15 RX ORDER — MIDAZOLAM HYDROCHLORIDE 1 MG/ML
INJECTION, SOLUTION INTRAMUSCULAR; INTRAVENOUS PRN
Status: DISCONTINUED | OUTPATIENT
Start: 2020-09-15 | End: 2020-09-15 | Stop reason: HOSPADM

## 2020-09-15 RX ORDER — ASPIRIN 325 MG
325 TABLET ORAL DAILY
Status: CANCELLED | OUTPATIENT
Start: 2020-09-16

## 2020-09-15 RX ORDER — ASPIRIN 325 MG
325 TABLET ORAL DAILY
Status: CANCELLED | OUTPATIENT
Start: 2020-09-15

## 2020-09-15 RX ORDER — ASPIRIN 81 MG/1
81 TABLET, CHEWABLE ORAL DAILY
Status: DISCONTINUED | OUTPATIENT
Start: 2020-09-16 | End: 2020-09-16 | Stop reason: HOSPADM

## 2020-09-15 RX ORDER — CLONIDINE HYDROCHLORIDE 0.1 MG/1
0.1 TABLET ORAL EVERY 4 HOURS PRN
Status: CANCELLED | OUTPATIENT
Start: 2020-09-15 | End: 2020-09-16

## 2020-09-15 RX ORDER — CLOPIDOGREL BISULFATE 75 MG/1
75 TABLET ORAL DAILY
Status: DISCONTINUED | OUTPATIENT
Start: 2020-09-16 | End: 2020-09-16 | Stop reason: HOSPADM

## 2020-09-15 RX ORDER — ACETAMINOPHEN 650 MG/1
650 SUPPOSITORY RECTAL EVERY 4 HOURS PRN
Status: CANCELLED | OUTPATIENT
Start: 2020-09-15

## 2020-09-15 RX ORDER — NITROGLYCERIN 5 MG/ML
INJECTION, SOLUTION INTRAVENOUS PRN
Status: DISCONTINUED | OUTPATIENT
Start: 2020-09-15 | End: 2020-09-15 | Stop reason: HOSPADM

## 2020-09-15 RX ORDER — 0.9 % SODIUM CHLORIDE 0.9 %
2 VIAL (ML) INJECTION EVERY 12 HOURS SCHEDULED
Status: DISCONTINUED | OUTPATIENT
Start: 2020-09-15 | End: 2020-09-16 | Stop reason: HOSPADM

## 2020-09-15 RX ORDER — CLOPIDOGREL BISULFATE 75 MG/1
75 TABLET ORAL DAILY
Status: CANCELLED | OUTPATIENT
Start: 2020-09-16

## 2020-09-15 RX ORDER — ACETAMINOPHEN 325 MG/1
650 TABLET ORAL EVERY 4 HOURS PRN
Status: CANCELLED | OUTPATIENT
Start: 2020-09-15

## 2020-09-15 RX ORDER — AMLODIPINE BESYLATE 10 MG/1
10 TABLET ORAL DAILY
Status: DISCONTINUED | OUTPATIENT
Start: 2020-09-15 | End: 2020-09-16 | Stop reason: HOSPADM

## 2020-09-15 RX ORDER — HYDRALAZINE HYDROCHLORIDE 20 MG/ML
10 INJECTION INTRAMUSCULAR; INTRAVENOUS EVERY 4 HOURS PRN
Status: CANCELLED | OUTPATIENT
Start: 2020-09-15 | End: 2020-09-16

## 2020-09-15 RX ORDER — SODIUM CHLORIDE 9 MG/ML
INJECTION, SOLUTION INTRAVENOUS CONTINUOUS
Status: DISCONTINUED | OUTPATIENT
Start: 2020-09-15 | End: 2020-09-15

## 2020-09-15 RX ORDER — SODIUM CHLORIDE 9 MG/ML
INJECTION, SOLUTION INTRAVENOUS CONTINUOUS
Status: CANCELLED | OUTPATIENT
Start: 2020-09-15

## 2020-09-15 RX ORDER — ISOSORBIDE MONONITRATE 30 MG/1
30 TABLET, EXTENDED RELEASE ORAL DAILY
Status: DISCONTINUED | OUTPATIENT
Start: 2020-09-15 | End: 2020-09-16 | Stop reason: HOSPADM

## 2020-09-15 RX ADMIN — SODIUM CHLORIDE: 0.9 INJECTION, SOLUTION INTRAVENOUS at 00:42

## 2020-09-15 RX ADMIN — ISOSORBIDE MONONITRATE 30 MG: 30 TABLET, EXTENDED RELEASE ORAL at 08:23

## 2020-09-15 RX ADMIN — HYDRALAZINE HYDROCHLORIDE 25 MG: 25 TABLET, FILM COATED ORAL at 05:00

## 2020-09-15 RX ADMIN — METOPROLOL SUCCINATE 12.5 MG: 25 TABLET, EXTENDED RELEASE ORAL at 08:23

## 2020-09-15 RX ADMIN — CLOPIDOGREL BISULFATE 75 MG: 75 TABLET, FILM COATED ORAL at 08:23

## 2020-09-15 RX ADMIN — SODIUM CHLORIDE, PRESERVATIVE FREE 2 ML: 5 INJECTION INTRAVENOUS at 21:42

## 2020-09-15 RX ADMIN — ASPIRIN 81 MG: 81 TABLET, COATED ORAL at 08:23

## 2020-09-15 RX ADMIN — AMLODIPINE BESYLATE 10 MG: 10 TABLET ORAL at 08:23

## 2020-09-15 RX ADMIN — PANTOPRAZOLE SODIUM 40 MG: 40 TABLET, DELAYED RELEASE ORAL at 06:48

## 2020-09-15 RX ADMIN — EZETIMIBE 10 MG: 10 TABLET ORAL at 08:23

## 2020-09-15 ASSESSMENT — COGNITIVE AND FUNCTIONAL STATUS - GENERAL
HELP NEEDED DRESSING REGULAR LOWER BODY CLOTHING: A LITTLE
DAILY_ACTIVITY_CONVERTED_SCORE: 44.27
APPLIED_COGNITIVE_CONVERTED_SCORE: 62.21
BASIC_MOBILITY_RAW_SCORE: 21
BASIC_MOBILITY_CONVERTED_SCORE: 45.55
APPLIED_COGNITIVE_RAW_SCORE: 24
HELP NEEDED FOR BATHING: A LITTLE
DAILY_ACTIVITY_RAW_SCORE: 21
HELP NEEDED FOR TOILETING: A LITTLE

## 2020-09-15 ASSESSMENT — PAIN SCALES - GENERAL
PAINLEVEL_OUTOF10: 0
PAINLEVEL_OUTOF10: 2

## 2020-09-15 ASSESSMENT — ACTIVITIES OF DAILY LIVING (ADL): PRIOR_ADL: INDEPENDENT

## 2020-09-16 VITALS
WEIGHT: 162.26 LBS | BODY MASS INDEX: 21.98 KG/M2 | SYSTOLIC BLOOD PRESSURE: 105 MMHG | DIASTOLIC BLOOD PRESSURE: 55 MMHG | HEART RATE: 60 BPM | OXYGEN SATURATION: 95 % | HEIGHT: 72 IN | TEMPERATURE: 98.4 F | RESPIRATION RATE: 16 BRPM

## 2020-09-16 LAB
ANION GAP SERPL CALC-SCNC: 8 MMOL/L (ref 10–20)
ATRIAL RATE (BPM): 68
BUN SERPL-MCNC: 15 MG/DL (ref 6–20)
BUN/CREAT SERPL: 12 (ref 7–25)
CALCIUM SERPL-MCNC: 9.2 MG/DL (ref 8.4–10.2)
CHLORIDE SERPL-SCNC: 104 MMOL/L (ref 98–107)
CO2 SERPL-SCNC: 26 MMOL/L (ref 21–32)
CREAT SERPL-MCNC: 1.26 MG/DL (ref 0.67–1.17)
GLUCOSE SERPL-MCNC: 121 MG/DL (ref 65–99)
P AXIS (DEGREES): 16
POTASSIUM SERPL-SCNC: 3.8 MMOL/L (ref 3.4–5.1)
PR-INTERVAL (MSEC): 148
QRS-INTERVAL (MSEC): 90
QT-INTERVAL (MSEC): 418
QTC: 444
R AXIS (DEGREES): 36
REPORT TEXT: NORMAL
SODIUM SERPL-SCNC: 134 MMOL/L (ref 135–145)
T AXIS (DEGREES): 14
VENTRICULAR RATE EKG/MIN (BPM): 68

## 2020-09-16 PROCEDURE — 10002800 HB RX 250 W HCPCS: Performed by: HOSPITALIST

## 2020-09-16 PROCEDURE — 93005 ELECTROCARDIOGRAM TRACING: CPT | Performed by: INTERNAL MEDICINE

## 2020-09-16 PROCEDURE — 96372 THER/PROPH/DIAG INJ SC/IM: CPT

## 2020-09-16 PROCEDURE — 10002803 HB RX 637: Performed by: INTERNAL MEDICINE

## 2020-09-16 PROCEDURE — 80048 BASIC METABOLIC PNL TOTAL CA: CPT

## 2020-09-16 PROCEDURE — 10002803 HB RX 637: Performed by: HOSPITALIST

## 2020-09-16 PROCEDURE — 10004651 HB RX, NO CHARGE ITEM: Performed by: INTERNAL MEDICINE

## 2020-09-16 PROCEDURE — G0378 HOSPITAL OBSERVATION PER HR: HCPCS

## 2020-09-16 RX ORDER — ISOSORBIDE MONONITRATE 30 MG/1
30 TABLET, EXTENDED RELEASE ORAL DAILY
Qty: 30 TABLET | Refills: 0 | Status: SHIPPED | OUTPATIENT
Start: 2020-09-17 | End: 2021-04-18

## 2020-09-16 RX ORDER — BEMPEDOIC ACID 180 MG/1
1 TABLET, FILM COATED ORAL DAILY
Qty: 30 TABLET | Status: SHIPPED | COMMUNITY
Start: 2020-09-16 | End: 2021-04-18

## 2020-09-16 RX ORDER — ROSUVASTATIN CALCIUM 10 MG/1
10 TABLET, COATED ORAL
Qty: 30 TABLET | Refills: 0 | Status: SHIPPED | OUTPATIENT
Start: 2020-09-17 | End: 2021-04-18

## 2020-09-16 RX ADMIN — ENOXAPARIN SODIUM 40 MG: 40 INJECTION SUBCUTANEOUS at 08:36

## 2020-09-16 RX ADMIN — ISOSORBIDE MONONITRATE 30 MG: 30 TABLET, EXTENDED RELEASE ORAL at 08:35

## 2020-09-16 RX ADMIN — ASPIRIN 81 MG: 81 TABLET, CHEWABLE ORAL at 08:36

## 2020-09-16 RX ADMIN — EZETIMIBE 10 MG: 10 TABLET ORAL at 08:36

## 2020-09-16 RX ADMIN — METOPROLOL SUCCINATE 12.5 MG: 25 TABLET, EXTENDED RELEASE ORAL at 08:35

## 2020-09-16 RX ADMIN — CLOPIDOGREL BISULFATE 75 MG: 75 TABLET, FILM COATED ORAL at 08:36

## 2020-09-16 RX ADMIN — AMLODIPINE BESYLATE 10 MG: 10 TABLET ORAL at 08:35

## 2020-09-16 RX ADMIN — PANTOPRAZOLE SODIUM 40 MG: 40 TABLET, DELAYED RELEASE ORAL at 06:44

## 2020-09-16 RX ADMIN — SODIUM CHLORIDE, PRESERVATIVE FREE 2 ML: 5 INJECTION INTRAVENOUS at 08:37

## 2020-09-16 ASSESSMENT — PAIN SCALES - GENERAL: PAINLEVEL_OUTOF10: 0

## 2021-03-09 PROBLEM — N18.30 STAGE 3 CHRONIC KIDNEY DISEASE, UNSPECIFIED WHETHER STAGE 3A OR 3B CKD (HCC): Status: ACTIVE | Noted: 2021-03-09

## 2021-03-09 PROBLEM — K27.9 PEPTIC ULCER DISEASE: Status: ACTIVE | Noted: 2021-03-09

## 2021-03-09 PROBLEM — D64.9 ANEMIA, UNSPECIFIED TYPE: Status: ACTIVE | Noted: 2021-03-09

## 2021-04-18 ENCOUNTER — APPOINTMENT (OUTPATIENT)
Dept: GENERAL RADIOLOGY | Age: 81
End: 2021-04-18

## 2021-04-18 ENCOUNTER — HOSPITAL ENCOUNTER (OUTPATIENT)
Age: 81
Setting detail: OBSERVATION
Discharge: HOME | End: 2021-04-19
Attending: EMERGENCY MEDICINE | Admitting: INTERNAL MEDICINE

## 2021-04-18 DIAGNOSIS — R07.9 CHEST PAIN, UNSPECIFIED TYPE: ICD-10-CM

## 2021-04-18 DIAGNOSIS — I10 HYPERTENSION, UNSPECIFIED TYPE: Primary | ICD-10-CM

## 2021-04-18 LAB
ANION GAP SERPL CALC-SCNC: 8 MMOL/L (ref 10–20)
BASOPHILS # BLD: 0 K/MCL (ref 0–0.3)
BASOPHILS NFR BLD: 1 %
BUN SERPL-MCNC: 15 MG/DL (ref 6–20)
BUN/CREAT SERPL: 13 (ref 7–25)
CALCIUM SERPL-MCNC: 9.1 MG/DL (ref 8.4–10.2)
CHLORIDE SERPL-SCNC: 103 MMOL/L (ref 98–107)
CO2 SERPL-SCNC: 28 MMOL/L (ref 21–32)
CREAT SERPL-MCNC: 1.2 MG/DL (ref 0.67–1.17)
DEPRECATED RDW RBC: 43.8 FL (ref 39–50)
EOSINOPHIL # BLD: 0.2 K/MCL (ref 0–0.5)
EOSINOPHIL NFR BLD: 4 %
ERYTHROCYTE [DISTWIDTH] IN BLOOD: 12.9 % (ref 11–15)
FASTING DURATION TIME PATIENT: ABNORMAL H
GFR SERPLBLD BASED ON 1.73 SQ M-ARVRAT: 56 ML/MIN/1.73M2
GLUCOSE SERPL-MCNC: 100 MG/DL (ref 65–99)
HCT VFR BLD CALC: 38.3 % (ref 39–51)
HGB BLD-MCNC: 13.1 G/DL (ref 13–17)
IMM GRANULOCYTES # BLD AUTO: 0 K/MCL (ref 0–0.2)
IMM GRANULOCYTES # BLD: 1 %
LYMPHOCYTES # BLD: 1.2 K/MCL (ref 1–4)
LYMPHOCYTES NFR BLD: 31 %
MAGNESIUM SERPL-MCNC: 2.3 MG/DL (ref 1.7–2.4)
MCH RBC QN AUTO: 31.8 PG (ref 26–34)
MCHC RBC AUTO-ENTMCNC: 34.2 G/DL (ref 32–36.5)
MCV RBC AUTO: 93 FL (ref 78–100)
MONOCYTES # BLD: 0.6 K/MCL (ref 0.3–0.9)
MONOCYTES NFR BLD: 14 %
NEUTROPHILS # BLD: 1.9 K/MCL (ref 1.8–7.7)
NEUTROPHILS NFR BLD: 49 %
NRBC BLD MANUAL-RTO: 0 /100 WBC
PLATELET # BLD AUTO: 155 K/MCL (ref 140–450)
POTASSIUM SERPL-SCNC: 4.6 MMOL/L (ref 3.4–5.1)
RAINBOW EXTRA TUBES HOLD SPECIMEN: NORMAL
RBC # BLD: 4.12 MIL/MCL (ref 4.5–5.9)
SODIUM SERPL-SCNC: 134 MMOL/L (ref 135–145)
TROPONIN I SERPL HS-MCNC: <0.02 NG/ML
TROPONIN I SERPL HS-MCNC: <0.02 NG/ML
WBC # BLD: 3.8 K/MCL (ref 4.2–11)

## 2021-04-18 PROCEDURE — G0378 HOSPITAL OBSERVATION PER HR: HCPCS

## 2021-04-18 PROCEDURE — 99285 EMERGENCY DEPT VISIT HI MDM: CPT

## 2021-04-18 PROCEDURE — 36415 COLL VENOUS BLD VENIPUNCTURE: CPT | Performed by: INTERNAL MEDICINE

## 2021-04-18 PROCEDURE — 80048 BASIC METABOLIC PNL TOTAL CA: CPT | Performed by: EMERGENCY MEDICINE

## 2021-04-18 PROCEDURE — 85025 COMPLETE CBC W/AUTO DIFF WBC: CPT | Performed by: EMERGENCY MEDICINE

## 2021-04-18 PROCEDURE — 96374 THER/PROPH/DIAG INJ IV PUSH: CPT

## 2021-04-18 PROCEDURE — 10002803 HB RX 637: Performed by: PHYSICIAN ASSISTANT

## 2021-04-18 PROCEDURE — 83735 ASSAY OF MAGNESIUM: CPT | Performed by: EMERGENCY MEDICINE

## 2021-04-18 PROCEDURE — 10002800 HB RX 250 W HCPCS: Performed by: EMERGENCY MEDICINE

## 2021-04-18 PROCEDURE — 10002800 HB RX 250 W HCPCS: Performed by: INTERNAL MEDICINE

## 2021-04-18 PROCEDURE — 84484 ASSAY OF TROPONIN QUANT: CPT | Performed by: EMERGENCY MEDICINE

## 2021-04-18 PROCEDURE — 84484 ASSAY OF TROPONIN QUANT: CPT | Performed by: INTERNAL MEDICINE

## 2021-04-18 PROCEDURE — 71046 X-RAY EXAM CHEST 2 VIEWS: CPT

## 2021-04-18 PROCEDURE — 96372 THER/PROPH/DIAG INJ SC/IM: CPT

## 2021-04-18 PROCEDURE — 93005 ELECTROCARDIOGRAM TRACING: CPT | Performed by: PHYSICIAN ASSISTANT

## 2021-04-18 PROCEDURE — 10004651 HB RX, NO CHARGE ITEM: Performed by: INTERNAL MEDICINE

## 2021-04-18 RX ORDER — ASPIRIN 81 MG/1
TABLET, CHEWABLE ORAL
Status: DISPENSED
Start: 2021-04-18 | End: 2021-04-19

## 2021-04-18 RX ORDER — ENOXAPARIN SODIUM 100 MG/ML
40 INJECTION SUBCUTANEOUS NIGHTLY
Status: DISCONTINUED | OUTPATIENT
Start: 2021-04-18 | End: 2021-04-19 | Stop reason: HOSPADM

## 2021-04-18 RX ORDER — HYDRALAZINE HYDROCHLORIDE 20 MG/ML
10 INJECTION INTRAMUSCULAR; INTRAVENOUS EVERY 6 HOURS PRN
Status: DISCONTINUED | OUTPATIENT
Start: 2021-04-18 | End: 2021-04-19 | Stop reason: HOSPADM

## 2021-04-18 RX ORDER — ASPIRIN 81 MG/1
324 TABLET, CHEWABLE ORAL ONCE
Status: DISCONTINUED | OUTPATIENT
Start: 2021-04-18 | End: 2021-04-18

## 2021-04-18 RX ORDER — ACETAMINOPHEN 325 MG/1
650 TABLET ORAL EVERY 4 HOURS PRN
Status: DISCONTINUED | OUTPATIENT
Start: 2021-04-18 | End: 2021-04-19 | Stop reason: HOSPADM

## 2021-04-18 RX ORDER — 0.9 % SODIUM CHLORIDE 0.9 %
2 VIAL (ML) INJECTION EVERY 12 HOURS SCHEDULED
Status: DISCONTINUED | OUTPATIENT
Start: 2021-04-18 | End: 2021-04-19 | Stop reason: HOSPADM

## 2021-04-18 RX ORDER — NITROGLYCERIN 0.4 MG/1
0.4 TABLET SUBLINGUAL ONCE
Status: COMPLETED | OUTPATIENT
Start: 2021-04-18 | End: 2021-04-18

## 2021-04-18 RX ORDER — EZETIMIBE 10 MG/1
10 TABLET ORAL DAILY
Status: DISCONTINUED | OUTPATIENT
Start: 2021-04-19 | End: 2021-04-19 | Stop reason: HOSPADM

## 2021-04-18 RX ORDER — HYDRALAZINE HYDROCHLORIDE 20 MG/ML
10 INJECTION INTRAMUSCULAR; INTRAVENOUS ONCE
Status: COMPLETED | OUTPATIENT
Start: 2021-04-18 | End: 2021-04-18

## 2021-04-18 RX ORDER — METOPROLOL SUCCINATE 25 MG/1
25 TABLET, EXTENDED RELEASE ORAL DAILY
Status: DISCONTINUED | OUTPATIENT
Start: 2021-04-19 | End: 2021-04-19 | Stop reason: HOSPADM

## 2021-04-18 RX ORDER — CLOPIDOGREL BISULFATE 75 MG/1
75 TABLET ORAL DAILY
Status: DISCONTINUED | OUTPATIENT
Start: 2021-04-19 | End: 2021-04-19 | Stop reason: HOSPADM

## 2021-04-18 RX ORDER — POLYETHYLENE GLYCOL 3350 17 G/17G
17 POWDER, FOR SOLUTION ORAL DAILY PRN
Status: DISCONTINUED | OUTPATIENT
Start: 2021-04-18 | End: 2021-04-19 | Stop reason: HOSPADM

## 2021-04-18 RX ORDER — NITROGLYCERIN 0.4 MG/1
0.4 TABLET SUBLINGUAL EVERY 5 MIN PRN
Status: DISCONTINUED | OUTPATIENT
Start: 2021-04-18 | End: 2021-04-19 | Stop reason: HOSPADM

## 2021-04-18 RX ORDER — PANTOPRAZOLE SODIUM 40 MG/1
40 TABLET, DELAYED RELEASE ORAL DAILY
Status: DISCONTINUED | OUTPATIENT
Start: 2021-04-19 | End: 2021-04-19 | Stop reason: HOSPADM

## 2021-04-18 RX ADMIN — NITROGLYCERIN 0.4 MG: 0.4 TABLET SUBLINGUAL at 15:09

## 2021-04-18 RX ADMIN — HYDRALAZINE HYDROCHLORIDE 10 MG: 20 INJECTION, SOLUTION INTRAMUSCULAR; INTRAVENOUS at 17:26

## 2021-04-18 RX ADMIN — SODIUM CHLORIDE, PRESERVATIVE FREE 2 ML: 5 INJECTION INTRAVENOUS at 21:28

## 2021-04-18 RX ADMIN — ENOXAPARIN SODIUM 40 MG: 40 INJECTION SUBCUTANEOUS at 21:29

## 2021-04-18 ASSESSMENT — ENCOUNTER SYMPTOMS
RHINORRHEA: 0
BACK PAIN: 0
EYE PAIN: 0
ABDOMINAL PAIN: 0
CONSTIPATION: 0
WOUND: 0
SORE THROAT: 0
LIGHT-HEADEDNESS: 0
NUMBNESS: 0
CHEST TIGHTNESS: 1
SHORTNESS OF BREATH: 0
DIZZINESS: 0
CHILLS: 0
FATIGUE: 0
COUGH: 0
DIARRHEA: 0
NAUSEA: 0
VOMITING: 0
FEVER: 0
DIAPHORESIS: 1
HALLUCINATIONS: 0

## 2021-04-18 ASSESSMENT — PATIENT HEALTH QUESTIONNAIRE - PHQ9
SUM OF ALL RESPONSES TO PHQ9 QUESTIONS 1 AND 2: 0
IS PATIENT ABLE TO COMPLETE PHQ2 OR PHQ9: YES
1. LITTLE INTEREST OR PLEASURE IN DOING THINGS: NOT AT ALL
1. LITTLE INTEREST OR PLEASURE IN DOING THINGS: NOT AT ALL
CLINICAL INTERPRETATION OF PHQ2 SCORE: NO FURTHER SCREENING NEEDED
CLINICAL INTERPRETATION OF PHQ2 SCORE: NO FURTHER SCREENING NEEDED
SUM OF ALL RESPONSES TO PHQ9 QUESTIONS 1 AND 2: 0
2. FEELING DOWN, DEPRESSED OR HOPELESS: NOT AT ALL
SUM OF ALL RESPONSES TO PHQ9 QUESTIONS 1 AND 2: 0
IS PATIENT ABLE TO COMPLETE PHQ2 OR PHQ9: YES
CLINICAL INTERPRETATION OF PHQ9 SCORE: NO FURTHER SCREENING NEEDED
2. FEELING DOWN, DEPRESSED OR HOPELESS: NOT AT ALL

## 2021-04-18 ASSESSMENT — COGNITIVE AND FUNCTIONAL STATUS - GENERAL
DO YOU HAVE DIFFICULTY DRESSING OR BATHING: NO
ARE YOU DEAF OR DO YOU HAVE SERIOUS DIFFICULTY  HEARING: NO
BECAUSE OF A PHYSICAL, MENTAL, OR EMOTIONAL CONDITION, DO YOU HAVE SERIOUS DIFFICULTY CONCENTRATING, REMEMBERING OR MAKING DECISIONS: NO
BECAUSE OF A PHYSICAL, MENTAL, OR EMOTIONAL CONDITION, DO YOU HAVE DIFFICULTY DOING ERRANDS ALONE: NO
ARE YOU BLIND OR DO YOU HAVE SERIOUS DIFFICULTY SEEING, EVEN WHEN WEARING GLASSES: NO
DO YOU HAVE SERIOUS DIFFICULTY WALKING OR CLIMBING STAIRS: NO

## 2021-04-18 ASSESSMENT — ACTIVITIES OF DAILY LIVING (ADL)
RECENT_DECLINE_ADL: NO
ADL_SCORE: 12
CHRONIC_PAIN_PRESENT: NO
ADL_BEFORE_ADMISSION: INDEPENDENT
ADL_SHORT_OF_BREATH: YES

## 2021-04-18 ASSESSMENT — LIFESTYLE VARIABLES
HOW MANY STANDARD DRINKS CONTAINING ALCOHOL DO YOU HAVE ON A TYPICAL DAY: 0,1 OR 2
HOW OFTEN DO YOU HAVE 6 OR MORE DRINKS ON ONE OCCASION: NEVER
AUDIT-C TOTAL SCORE: 1
HOW OFTEN DO YOU HAVE A DRINK CONTAINING ALCOHOL: MONTHLY OR LESS
ALCOHOL_USE_STATUS: NO OR LOW RISK WITH VALIDATED TOOL

## 2021-04-18 ASSESSMENT — PAIN SCALES - GENERAL
PAINLEVEL_OUTOF10: 1
PAINLEVEL_OUTOF10: 0
PAINLEVEL_OUTOF10: 0

## 2021-04-18 ASSESSMENT — COLUMBIA-SUICIDE SEVERITY RATING SCALE - C-SSRS
2. HAVE YOU ACTUALLY HAD ANY THOUGHTS OF KILLING YOURSELF?: NO
1. WITHIN THE PAST MONTH, HAVE YOU WISHED YOU WERE DEAD OR WISHED YOU COULD GO TO SLEEP AND NOT WAKE UP?: NO
2. HAVE YOU ACTUALLY HAD ANY THOUGHTS OF KILLING YOURSELF?: NO
6. HAVE YOU EVER DONE ANYTHING, STARTED TO DO ANYTHING, OR PREPARED TO DO ANYTHING TO END YOUR LIFE?: NO
1. WITHIN THE PAST MONTH, HAVE YOU WISHED YOU WERE DEAD OR WISHED YOU COULD GO TO SLEEP AND NOT WAKE UP?: NO
IS THE PATIENT ABLE TO COMPLETE C-SSRS: YES
IS THE PATIENT ABLE TO COMPLETE C-SSRS: YES

## 2021-04-18 ASSESSMENT — HEART SCORE
RISK FACTORS: EQUAL OR GREATER  THAN 3 RISK FACTORS OR HISTORY OF ATHEROSCLEROTIC DISEASE
HISTORY: SLIGHTLY SUSPICIOUS
AGE: EQUAL OR GREATER THAN 65
EKG: NORMAL
HEART SCORE: 4
TROPONIN: EQUAL OR LESS THAN NORMAL LIMIT

## 2021-04-19 ENCOUNTER — APPOINTMENT (OUTPATIENT)
Dept: CARDIOLOGY | Age: 81
End: 2021-04-19
Attending: INTERNAL MEDICINE

## 2021-04-19 ENCOUNTER — APPOINTMENT (OUTPATIENT)
Dept: ULTRASOUND IMAGING | Age: 81
End: 2021-04-19
Attending: INTERNAL MEDICINE

## 2021-04-19 VITALS
WEIGHT: 163.14 LBS | SYSTOLIC BLOOD PRESSURE: 124 MMHG | RESPIRATION RATE: 20 BRPM | BODY MASS INDEX: 22.1 KG/M2 | HEIGHT: 72 IN | TEMPERATURE: 97.9 F | HEART RATE: 54 BPM | DIASTOLIC BLOOD PRESSURE: 68 MMHG | OXYGEN SATURATION: 98 %

## 2021-04-19 LAB
ANION GAP SERPL CALC-SCNC: 5 MMOL/L (ref 10–20)
ATRIAL RATE (BPM): 68
BASOPHILS # BLD: 0 K/MCL (ref 0–0.3)
BASOPHILS NFR BLD: 1 %
BUN SERPL-MCNC: 14 MG/DL (ref 6–20)
BUN/CREAT SERPL: 11 (ref 7–25)
CALCIUM SERPL-MCNC: 8.8 MG/DL (ref 8.4–10.2)
CHLORIDE SERPL-SCNC: 108 MMOL/L (ref 98–107)
CO2 SERPL-SCNC: 29 MMOL/L (ref 21–32)
CREAT SERPL-MCNC: 1.33 MG/DL (ref 0.67–1.17)
DEPRECATED RDW RBC: 45.1 FL (ref 39–50)
EOSINOPHIL # BLD: 0.2 K/MCL (ref 0–0.5)
EOSINOPHIL NFR BLD: 6 %
ERYTHROCYTE [DISTWIDTH] IN BLOOD: 13.2 % (ref 11–15)
FASTING DURATION TIME PATIENT: ABNORMAL H
GFR SERPLBLD BASED ON 1.73 SQ M-ARVRAT: 50 ML/MIN/1.73M2
GLUCOSE SERPL-MCNC: 85 MG/DL (ref 65–99)
HCT VFR BLD CALC: 36.1 % (ref 39–51)
HGB BLD-MCNC: 12.2 G/DL (ref 13–17)
IMM GRANULOCYTES # BLD AUTO: 0 K/MCL (ref 0–0.2)
IMM GRANULOCYTES # BLD: 0 %
LYMPHOCYTES # BLD: 1.1 K/MCL (ref 1–4)
LYMPHOCYTES NFR BLD: 32 %
MAGNESIUM SERPL-MCNC: 2.4 MG/DL (ref 1.7–2.4)
MCH RBC QN AUTO: 31.6 PG (ref 26–34)
MCHC RBC AUTO-ENTMCNC: 33.8 G/DL (ref 32–36.5)
MCV RBC AUTO: 93.5 FL (ref 78–100)
MONOCYTES # BLD: 0.4 K/MCL (ref 0.3–0.9)
MONOCYTES NFR BLD: 12 %
NEUTROPHILS # BLD: 1.7 K/MCL (ref 1.8–7.7)
NEUTROPHILS NFR BLD: 49 %
NRBC BLD MANUAL-RTO: 0 /100 WBC
P AXIS (DEGREES): 26
PLATELET # BLD AUTO: 141 K/MCL (ref 140–450)
POTASSIUM SERPL-SCNC: 4.4 MMOL/L (ref 3.4–5.1)
PR-INTERVAL (MSEC): 166
QRS-INTERVAL (MSEC): 98
QT-INTERVAL (MSEC): 424
QTC: 451
R AXIS (DEGREES): 24
RBC # BLD: 3.86 MIL/MCL (ref 4.5–5.9)
REPORT TEXT: NORMAL
SODIUM SERPL-SCNC: 138 MMOL/L (ref 135–145)
T AXIS (DEGREES): 24
TROPONIN I SERPL HS-MCNC: <0.02 NG/ML
VENTRICULAR RATE EKG/MIN (BPM): 68
WBC # BLD: 3.4 K/MCL (ref 4.2–11)

## 2021-04-19 PROCEDURE — 93306 TTE W/DOPPLER COMPLETE: CPT

## 2021-04-19 PROCEDURE — 85025 COMPLETE CBC W/AUTO DIFF WBC: CPT | Performed by: INTERNAL MEDICINE

## 2021-04-19 PROCEDURE — 10002803 HB RX 637: Performed by: INTERNAL MEDICINE

## 2021-04-19 PROCEDURE — 83735 ASSAY OF MAGNESIUM: CPT | Performed by: INTERNAL MEDICINE

## 2021-04-19 PROCEDURE — 80048 BASIC METABOLIC PNL TOTAL CA: CPT | Performed by: INTERNAL MEDICINE

## 2021-04-19 PROCEDURE — 84484 ASSAY OF TROPONIN QUANT: CPT | Performed by: INTERNAL MEDICINE

## 2021-04-19 PROCEDURE — 10004651 HB RX, NO CHARGE ITEM: Performed by: INTERNAL MEDICINE

## 2021-04-19 PROCEDURE — G0378 HOSPITAL OBSERVATION PER HR: HCPCS

## 2021-04-19 PROCEDURE — 36415 COLL VENOUS BLD VENIPUNCTURE: CPT | Performed by: INTERNAL MEDICINE

## 2021-04-19 PROCEDURE — 93970 EXTREMITY STUDY: CPT

## 2021-04-19 RX ORDER — LOSARTAN POTASSIUM 50 MG/1
50 TABLET ORAL DAILY
Qty: 30 TABLET | Refills: 1 | Status: ON HOLD | OUTPATIENT
Start: 2021-04-20 | End: 2023-03-17 | Stop reason: HOSPADM

## 2021-04-19 RX ORDER — LOSARTAN POTASSIUM 50 MG/1
50 TABLET ORAL DAILY
Status: DISCONTINUED | OUTPATIENT
Start: 2021-04-19 | End: 2021-04-19 | Stop reason: HOSPADM

## 2021-04-19 RX ORDER — AMLODIPINE BESYLATE 5 MG/1
5 TABLET ORAL DAILY
Status: DISCONTINUED | OUTPATIENT
Start: 2021-04-19 | End: 2021-04-19 | Stop reason: HOSPADM

## 2021-04-19 RX ORDER — AMLODIPINE BESYLATE 5 MG/1
5 TABLET ORAL DAILY
Qty: 30 TABLET | Refills: 1 | Status: ON HOLD | OUTPATIENT
Start: 2021-04-20 | End: 2023-03-17 | Stop reason: HOSPADM

## 2021-04-19 RX ADMIN — CLOPIDOGREL BISULFATE 75 MG: 75 TABLET, FILM COATED ORAL at 08:54

## 2021-04-19 RX ADMIN — METOPROLOL SUCCINATE 25 MG: 25 TABLET, EXTENDED RELEASE ORAL at 08:53

## 2021-04-19 RX ADMIN — PANTOPRAZOLE SODIUM 40 MG: 40 TABLET, DELAYED RELEASE ORAL at 08:53

## 2021-04-19 RX ADMIN — AMLODIPINE BESYLATE 5 MG: 5 TABLET ORAL at 08:55

## 2021-04-19 RX ADMIN — LOSARTAN POTASSIUM 50 MG: 50 TABLET, FILM COATED ORAL at 10:55

## 2021-04-19 RX ADMIN — ASPIRIN 81 MG: 81 TABLET, COATED ORAL at 08:53

## 2021-04-19 RX ADMIN — SODIUM CHLORIDE, PRESERVATIVE FREE 2 ML: 5 INJECTION INTRAVENOUS at 09:05

## 2021-04-19 RX ADMIN — EZETIMIBE 10 MG: 10 TABLET ORAL at 08:53

## 2021-04-19 ASSESSMENT — PAIN SCALES - GENERAL: PAINLEVEL_OUTOF10: 0

## 2021-04-29 PROBLEM — J31.0 CHRONIC RHINITIS: Status: ACTIVE | Noted: 2021-04-29

## 2021-07-19 ENCOUNTER — WALK IN (OUTPATIENT)
Dept: URGENT CARE | Age: 81
End: 2021-07-19
Attending: EMERGENCY MEDICINE

## 2021-07-19 ENCOUNTER — APPOINTMENT (OUTPATIENT)
Dept: GENERAL RADIOLOGY | Age: 81
End: 2021-07-19
Attending: EMERGENCY MEDICINE

## 2021-07-19 ENCOUNTER — HOSPITAL ENCOUNTER (EMERGENCY)
Age: 81
Discharge: HOME | End: 2021-07-19
Attending: EMERGENCY MEDICINE

## 2021-07-19 VITALS
RESPIRATION RATE: 14 BRPM | HEIGHT: 72 IN | BODY MASS INDEX: 21.67 KG/M2 | OXYGEN SATURATION: 98 % | TEMPERATURE: 98.1 F | SYSTOLIC BLOOD PRESSURE: 182 MMHG | WEIGHT: 160 LBS | HEART RATE: 46 BPM | DIASTOLIC BLOOD PRESSURE: 72 MMHG

## 2021-07-19 VITALS
WEIGHT: 166 LBS | TEMPERATURE: 98 F | HEART RATE: 62 BPM | RESPIRATION RATE: 16 BRPM | OXYGEN SATURATION: 99 % | SYSTOLIC BLOOD PRESSURE: 167 MMHG | BODY MASS INDEX: 22.51 KG/M2 | DIASTOLIC BLOOD PRESSURE: 89 MMHG

## 2021-07-19 DIAGNOSIS — R20.2 PARESTHESIAS: Primary | ICD-10-CM

## 2021-07-19 DIAGNOSIS — R21 RASH AND NONSPECIFIC SKIN ERUPTION: ICD-10-CM

## 2021-07-19 DIAGNOSIS — R29.898 WEAKNESS OF BOTH LEGS: Primary | ICD-10-CM

## 2021-07-19 LAB
ALBUMIN SERPL-MCNC: 4 G/DL (ref 3.6–5.1)
ALBUMIN/GLOB SERPL: 1.3 {RATIO} (ref 1–2.4)
ALP SERPL-CCNC: 81 UNITS/L (ref 45–117)
ALT SERPL-CCNC: 23 UNITS/L
ANION GAP SERPL CALC-SCNC: 11 MMOL/L (ref 10–20)
AST SERPL-CCNC: 16 UNITS/L
BASOPHILS # BLD: 0 K/MCL (ref 0–0.3)
BASOPHILS NFR BLD: 1 %
BILIRUB SERPL-MCNC: 0.6 MG/DL (ref 0.2–1)
BUN SERPL-MCNC: 16 MG/DL (ref 6–20)
BUN/CREAT SERPL: 11 (ref 7–25)
CALCIUM SERPL-MCNC: 9.3 MG/DL (ref 8.4–10.2)
CHLORIDE SERPL-SCNC: 102 MMOL/L (ref 98–107)
CK SERPL-CCNC: 46 UNITS/L (ref 39–308)
CO2 SERPL-SCNC: 27 MMOL/L (ref 21–32)
CREAT SERPL-MCNC: 1.47 MG/DL (ref 0.67–1.17)
DEPRECATED RDW RBC: 42.5 FL (ref 39–50)
EOSINOPHIL # BLD: 0.1 K/MCL (ref 0–0.5)
EOSINOPHIL NFR BLD: 3 %
ERYTHROCYTE [DISTWIDTH] IN BLOOD: 12.8 % (ref 11–15)
FASTING DURATION TIME PATIENT: ABNORMAL H
GFR SERPLBLD BASED ON 1.73 SQ M-ARVRAT: 44 ML/MIN/1.73M2
GLOBULIN SER-MCNC: 3.1 G/DL (ref 2–4)
GLUCOSE SERPL-MCNC: 91 MG/DL (ref 65–99)
HCT VFR BLD CALC: 36.2 % (ref 39–51)
HGB BLD-MCNC: 12.4 G/DL (ref 13–17)
IMM GRANULOCYTES # BLD AUTO: 0 K/MCL (ref 0–0.2)
IMM GRANULOCYTES # BLD: 0 %
LYMPHOCYTES # BLD: 0.8 K/MCL (ref 1–4)
LYMPHOCYTES NFR BLD: 26 %
MAGNESIUM SERPL-MCNC: 2.5 MG/DL (ref 1.7–2.4)
MCH RBC QN AUTO: 31.2 PG (ref 26–34)
MCHC RBC AUTO-ENTMCNC: 34.3 G/DL (ref 32–36.5)
MCV RBC AUTO: 91.2 FL (ref 78–100)
MONOCYTES # BLD: 0.4 K/MCL (ref 0.3–0.9)
MONOCYTES NFR BLD: 13 %
NEUTROPHILS # BLD: 1.8 K/MCL (ref 1.8–7.7)
NEUTROPHILS NFR BLD: 57 %
NRBC BLD MANUAL-RTO: 0 /100 WBC
PLATELET # BLD AUTO: 153 K/MCL (ref 140–450)
POTASSIUM SERPL-SCNC: 5 MMOL/L (ref 3.4–5.1)
PROT SERPL-MCNC: 7.1 G/DL (ref 6.4–8.2)
RBC # BLD: 3.97 MIL/MCL (ref 4.5–5.9)
SODIUM SERPL-SCNC: 135 MMOL/L (ref 135–145)
TROPONIN I SERPL HS-MCNC: <0.02 NG/ML
WBC # BLD: 3.1 K/MCL (ref 4.2–11)

## 2021-07-19 PROCEDURE — 71046 X-RAY EXAM CHEST 2 VIEWS: CPT

## 2021-07-19 PROCEDURE — 99285 EMERGENCY DEPT VISIT HI MDM: CPT

## 2021-07-19 PROCEDURE — 83735 ASSAY OF MAGNESIUM: CPT | Performed by: EMERGENCY MEDICINE

## 2021-07-19 PROCEDURE — 10002807 HB RX 258: Performed by: EMERGENCY MEDICINE

## 2021-07-19 PROCEDURE — 99214 OFFICE O/P EST MOD 30 MIN: CPT

## 2021-07-19 PROCEDURE — 93005 ELECTROCARDIOGRAM TRACING: CPT | Performed by: EMERGENCY MEDICINE

## 2021-07-19 PROCEDURE — 82550 ASSAY OF CK (CPK): CPT | Performed by: EMERGENCY MEDICINE

## 2021-07-19 PROCEDURE — 84484 ASSAY OF TROPONIN QUANT: CPT | Performed by: EMERGENCY MEDICINE

## 2021-07-19 PROCEDURE — 85025 COMPLETE CBC W/AUTO DIFF WBC: CPT | Performed by: EMERGENCY MEDICINE

## 2021-07-19 PROCEDURE — 96360 HYDRATION IV INFUSION INIT: CPT

## 2021-07-19 PROCEDURE — 80053 COMPREHEN METABOLIC PANEL: CPT | Performed by: EMERGENCY MEDICINE

## 2021-07-19 RX ORDER — METHYLPREDNISOLONE 4 MG/1
4 TABLET ORAL SEE ADMIN INSTRUCTIONS
Qty: 21 TABLET | Refills: 0 | Status: SHIPPED | OUTPATIENT
Start: 2021-07-19 | End: 2023-03-14

## 2021-07-19 RX ADMIN — SODIUM CHLORIDE 1000 ML: 0.9 INJECTION, SOLUTION INTRAVENOUS at 16:46

## 2021-07-19 ASSESSMENT — ENCOUNTER SYMPTOMS
SEIZURES: 0
TREMORS: 0
NAUSEA: 0
SHORTNESS OF BREATH: 0
ABDOMINAL PAIN: 0
NUMBNESS: 0
SPEECH DIFFICULTY: 0
GASTROINTESTINAL NEGATIVE: 1
LIGHT-HEADEDNESS: 0
VOMITING: 0
BACK PAIN: 0
CHILLS: 0
FEVER: 0
COUGH: 0
SORE THROAT: 0
WEAKNESS: 1
DIARRHEA: 0
WEAKNESS: 0
DIZZINESS: 0
RESPIRATORY NEGATIVE: 1
FEVER: 0
ADENOPATHY: 0
CHILLS: 0

## 2021-07-19 ASSESSMENT — PAIN SCALES - GENERAL
PAINLEVEL_OUTOF10: 0
PAINLEVEL: 3
PAINLEVEL: 3

## 2021-07-20 LAB
ATRIAL RATE (BPM): 51
P AXIS (DEGREES): 33
PR-INTERVAL (MSEC): 156
QRS-INTERVAL (MSEC): 90
QT-INTERVAL (MSEC): 482
QTC: 444
R AXIS (DEGREES): 13
RAINBOW EXTRA TUBES HOLD SPECIMEN: NORMAL
REPORT TEXT: NORMAL
T AXIS (DEGREES): 43
VENTRICULAR RATE EKG/MIN (BPM): 51

## 2021-09-04 ENCOUNTER — IMMUNIZATION (OUTPATIENT)
Dept: LAB | Facility: HOSPITAL | Age: 81
End: 2021-09-04
Attending: EMERGENCY MEDICINE
Payer: MEDICARE

## 2021-09-04 DIAGNOSIS — Z23 NEED FOR VACCINATION: Primary | ICD-10-CM

## 2021-09-04 PROCEDURE — 0001A SARSCOV2 VAC 30MCG/0.3ML IM: CPT

## 2021-09-25 ENCOUNTER — IMMUNIZATION (OUTPATIENT)
Dept: LAB | Facility: HOSPITAL | Age: 81
End: 2021-09-25
Attending: EMERGENCY MEDICINE
Payer: MEDICARE

## 2021-09-25 DIAGNOSIS — Z23 NEED FOR VACCINATION: Primary | ICD-10-CM

## 2021-09-25 PROCEDURE — 0002A SARSCOV2 VAC 30MCG/0.3ML IM: CPT

## 2022-02-26 ENCOUNTER — IMMUNIZATION (OUTPATIENT)
Dept: LAB | Age: 82
End: 2022-02-26
Attending: EMERGENCY MEDICINE
Payer: MEDICARE

## 2022-02-26 DIAGNOSIS — Z23 NEED FOR VACCINATION: Primary | ICD-10-CM

## 2022-02-26 PROCEDURE — 0054A SARSCOV2 VAC 30MCG TRS SUCR: CPT

## 2022-03-14 PROBLEM — F39 MOOD DISORDER: Status: ACTIVE | Noted: 2022-03-14

## 2022-03-14 PROBLEM — F39 MOOD DISORDER (HCC): Status: ACTIVE | Noted: 2022-03-14

## 2023-03-14 ENCOUNTER — APPOINTMENT (OUTPATIENT)
Dept: MRI IMAGING | Age: 83
DRG: 247 | End: 2023-03-14
Attending: PSYCHIATRY & NEUROLOGY

## 2023-03-14 ENCOUNTER — APPOINTMENT (OUTPATIENT)
Dept: GENERAL RADIOLOGY | Age: 83
DRG: 247 | End: 2023-03-14
Attending: EMERGENCY MEDICINE

## 2023-03-14 ENCOUNTER — HOSPITAL ENCOUNTER (INPATIENT)
Age: 83
LOS: 2 days | Discharge: HOME | DRG: 247 | End: 2023-03-17
Attending: EMERGENCY MEDICINE | Admitting: HOSPITALIST

## 2023-03-14 DIAGNOSIS — N18.32 STAGE 3B CHRONIC KIDNEY DISEASE (CMD): ICD-10-CM

## 2023-03-14 DIAGNOSIS — R42 VERTIGO: Primary | ICD-10-CM

## 2023-03-14 DIAGNOSIS — R07.9 CHEST PAIN WITH MODERATE RISK OF ACUTE CORONARY SYNDROME: ICD-10-CM

## 2023-03-14 DIAGNOSIS — Z79.01 CURRENT USE OF LONG TERM ANTICOAGULATION: ICD-10-CM

## 2023-03-14 DIAGNOSIS — I25.10 CORONARY ARTERIOSCLEROSIS: ICD-10-CM

## 2023-03-14 DIAGNOSIS — Z98.61 S/P PTCA (PERCUTANEOUS TRANSLUMINAL CORONARY ANGIOPLASTY): ICD-10-CM

## 2023-03-14 DIAGNOSIS — I10 UNCONTROLLED HYPERTENSION: ICD-10-CM

## 2023-03-14 PROBLEM — H35.30 MACULAR DEGENERATION OF BOTH EYES: Status: ACTIVE | Noted: 2023-03-14

## 2023-03-14 PROBLEM — E78.6 ELEVATED CHOLESTEROL/HIGH DENSITY LIPOPROTEIN RATIO: Status: ACTIVE | Noted: 2023-03-14

## 2023-03-14 PROBLEM — I25.5 ISCHEMIC CARDIOMYOPATHY: Status: ACTIVE | Noted: 2019-02-13

## 2023-03-14 PROBLEM — N40.0 BPH (BENIGN PROSTATIC HYPERPLASIA): Status: ACTIVE | Noted: 2023-03-14

## 2023-03-14 PROBLEM — Z95.9 S/P ARTERIAL STENT: Status: ACTIVE | Noted: 2023-03-14

## 2023-03-14 PROBLEM — K27.9 PEPTIC ULCER DISEASE: Status: ACTIVE | Noted: 2021-03-09

## 2023-03-14 PROBLEM — I70.0 ATHEROSCLEROSIS OF AORTA (CMD): Status: ACTIVE | Noted: 2019-02-13

## 2023-03-14 PROBLEM — I13.0 HYPERTENSIVE HEART AND KIDNEY DISEASE WITH HEART FAILURE AND CHRONIC KIDNEY DISEASE (CMD): Status: ACTIVE | Noted: 2020-03-02

## 2023-03-14 PROBLEM — H35.30 AGE-RELATED MACULAR DEGENERATION: Status: ACTIVE | Noted: 2023-03-14

## 2023-03-14 PROBLEM — D64.9 ANEMIA: Status: ACTIVE | Noted: 2021-03-09

## 2023-03-14 PROBLEM — I51.89 DIASTOLIC DYSFUNCTION: Status: ACTIVE | Noted: 2019-02-13

## 2023-03-14 PROBLEM — I25.810 CORONARY ARTERY DISEASE INVOLVING CORONARY BYPASS GRAFT OF NATIVE HEART WITHOUT ANGINA PECTORIS: Status: ACTIVE | Noted: 2018-03-13

## 2023-03-14 PROBLEM — I73.9 PVD (PERIPHERAL VASCULAR DISEASE) (CMD): Status: ACTIVE | Noted: 2023-03-14

## 2023-03-14 PROBLEM — F39 EPISODIC MOOD DISORDER (CMD): Status: ACTIVE | Noted: 2022-03-14

## 2023-03-14 PROBLEM — Z92.89 HISTORY OF BLOOD TRANSFUSION: Status: ACTIVE | Noted: 2023-03-14

## 2023-03-14 PROBLEM — G31.9 BRAIN ATROPHY (CMD): Status: ACTIVE | Noted: 2020-07-27

## 2023-03-14 PROBLEM — Z91.89 RISK FACTORS FOR OBSTRUCTIVE SLEEP APNEA: Status: ACTIVE | Noted: 2023-03-14

## 2023-03-14 PROBLEM — N18.30 STAGE 3 CHRONIC KIDNEY DISEASE (CMD): Status: ACTIVE | Noted: 2021-03-09

## 2023-03-14 PROBLEM — G89.29 CHRONIC PAIN: Status: ACTIVE | Noted: 2023-03-14

## 2023-03-14 LAB
ALBUMIN SERPL-MCNC: 3.7 G/DL (ref 3.6–5.1)
ALBUMIN/GLOB SERPL: 1.2 {RATIO} (ref 1–2.4)
ALP SERPL-CCNC: 71 UNITS/L (ref 45–117)
ALT SERPL-CCNC: 19 UNITS/L
ANION GAP SERPL CALC-SCNC: 7 MMOL/L (ref 7–19)
AST SERPL-CCNC: 17 UNITS/L
ATRIAL RATE (BPM): 67
BASOPHILS # BLD: 0 K/MCL (ref 0–0.3)
BASOPHILS NFR BLD: 1 %
BILIRUB SERPL-MCNC: 0.3 MG/DL (ref 0.2–1)
BUN SERPL-MCNC: 28 MG/DL (ref 6–20)
BUN/CREAT SERPL: 16 (ref 7–25)
CALCIUM SERPL-MCNC: 8.9 MG/DL (ref 8.4–10.2)
CHLORIDE SERPL-SCNC: 113 MMOL/L (ref 97–110)
CO2 SERPL-SCNC: 26 MMOL/L (ref 21–32)
CREAT SERPL-MCNC: 1.7 MG/DL (ref 0.67–1.17)
DEPRECATED RDW RBC: 48.9 FL (ref 39–50)
EOSINOPHIL # BLD: 0.2 K/MCL (ref 0–0.5)
EOSINOPHIL NFR BLD: 5 %
ERYTHROCYTE [DISTWIDTH] IN BLOOD: 13.4 % (ref 11–15)
FASTING DURATION TIME PATIENT: ABNORMAL H
GFR SERPLBLD BASED ON 1.73 SQ M-ARVRAT: 40 ML/MIN
GLOBULIN SER-MCNC: 3.1 G/DL (ref 2–4)
GLUCOSE SERPL-MCNC: 108 MG/DL (ref 70–99)
HCT VFR BLD CALC: 35.7 % (ref 39–51)
HGB BLD-MCNC: 11.6 G/DL (ref 13–17)
IMM GRANULOCYTES # BLD AUTO: 0 K/MCL (ref 0–0.2)
IMM GRANULOCYTES # BLD: 1 %
LYMPHOCYTES # BLD: 0.7 K/MCL (ref 1–4)
LYMPHOCYTES NFR BLD: 17 %
MAGNESIUM SERPL-MCNC: 2.2 MG/DL (ref 1.7–2.4)
MCH RBC QN AUTO: 32 PG (ref 26–34)
MCHC RBC AUTO-ENTMCNC: 32.5 G/DL (ref 32–36.5)
MCV RBC AUTO: 98.6 FL (ref 78–100)
MONOCYTES # BLD: 0.6 K/MCL (ref 0.3–0.9)
MONOCYTES NFR BLD: 13 %
NEUTROPHILS # BLD: 2.9 K/MCL (ref 1.8–7.7)
NEUTROPHILS NFR BLD: 63 %
NRBC BLD MANUAL-RTO: 0 /100 WBC
NT-PROBNP SERPL-MCNC: 1040 PG/ML
P AXIS (DEGREES): 61
PLATELET # BLD AUTO: 154 K/MCL (ref 140–450)
POTASSIUM SERPL-SCNC: 4.5 MMOL/L (ref 3.4–5.1)
PR-INTERVAL (MSEC): 140
PROT SERPL-MCNC: 6.8 G/DL (ref 6.4–8.2)
QRS-INTERVAL (MSEC): 92
QT-INTERVAL (MSEC): 412
QTC: 435
R AXIS (DEGREES): 40
RAINBOW EXTRA TUBES HOLD SPECIMEN: NORMAL
RAINBOW EXTRA TUBES HOLD SPECIMEN: NORMAL
RBC # BLD: 3.62 MIL/MCL (ref 4.5–5.9)
REPORT TEXT: NORMAL
SODIUM SERPL-SCNC: 141 MMOL/L (ref 135–145)
T AXIS (DEGREES): 19
TROPONIN I SERPL DL<=0.01 NG/ML-MCNC: 17 NG/L
TSH SERPL-ACNC: 5.2 MCUNITS/ML (ref 0.35–5)
VENTRICULAR RATE EKG/MIN (BPM): 67
WBC # BLD: 4.4 K/MCL (ref 4.2–11)

## 2023-03-14 PROCEDURE — 70544 MR ANGIOGRAPHY HEAD W/O DYE: CPT

## 2023-03-14 PROCEDURE — 83880 ASSAY OF NATRIURETIC PEPTIDE: CPT | Performed by: EMERGENCY MEDICINE

## 2023-03-14 PROCEDURE — A9585 GADOBUTROL INJECTION: HCPCS | Performed by: PSYCHIATRY & NEUROLOGY

## 2023-03-14 PROCEDURE — 10004651 HB RX, NO CHARGE ITEM: Performed by: HOSPITALIST

## 2023-03-14 PROCEDURE — 99285 EMERGENCY DEPT VISIT HI MDM: CPT

## 2023-03-14 PROCEDURE — 85025 COMPLETE CBC W/AUTO DIFF WBC: CPT | Performed by: EMERGENCY MEDICINE

## 2023-03-14 PROCEDURE — 84484 ASSAY OF TROPONIN QUANT: CPT | Performed by: EMERGENCY MEDICINE

## 2023-03-14 PROCEDURE — 96372 THER/PROPH/DIAG INJ SC/IM: CPT

## 2023-03-14 PROCEDURE — 83735 ASSAY OF MAGNESIUM: CPT | Performed by: EMERGENCY MEDICINE

## 2023-03-14 PROCEDURE — 10002803 HB RX 637: Performed by: INTERNAL MEDICINE

## 2023-03-14 PROCEDURE — G1004 CDSM NDSC: HCPCS

## 2023-03-14 PROCEDURE — 70553 MRI BRAIN STEM W/O & W/DYE: CPT

## 2023-03-14 PROCEDURE — 70549 MR ANGIOGRAPH NECK W/O&W/DYE: CPT

## 2023-03-14 PROCEDURE — 71046 X-RAY EXAM CHEST 2 VIEWS: CPT

## 2023-03-14 PROCEDURE — 10002800 HB RX 250 W HCPCS: Performed by: HOSPITALIST

## 2023-03-14 PROCEDURE — 93005 ELECTROCARDIOGRAM TRACING: CPT | Performed by: EMERGENCY MEDICINE

## 2023-03-14 PROCEDURE — 10002805 HB CONTRAST AGENT: Performed by: PSYCHIATRY & NEUROLOGY

## 2023-03-14 PROCEDURE — 36415 COLL VENOUS BLD VENIPUNCTURE: CPT

## 2023-03-14 PROCEDURE — G0378 HOSPITAL OBSERVATION PER HR: HCPCS

## 2023-03-14 PROCEDURE — 10002803 HB RX 637: Performed by: HOSPITALIST

## 2023-03-14 PROCEDURE — 84443 ASSAY THYROID STIM HORMONE: CPT | Performed by: EMERGENCY MEDICINE

## 2023-03-14 PROCEDURE — 80053 COMPREHEN METABOLIC PANEL: CPT | Performed by: EMERGENCY MEDICINE

## 2023-03-14 PROCEDURE — 10002803 HB RX 637: Performed by: EMERGENCY MEDICINE

## 2023-03-14 RX ORDER — GADOBUTROL 604.72 MG/ML
10 INJECTION INTRAVENOUS ONCE
Status: COMPLETED | OUTPATIENT
Start: 2023-03-14 | End: 2023-03-14

## 2023-03-14 RX ORDER — LOSARTAN POTASSIUM 50 MG/1
50 TABLET ORAL DAILY
Status: DISCONTINUED | OUTPATIENT
Start: 2023-03-14 | End: 2023-03-14

## 2023-03-14 RX ORDER — ACETAMINOPHEN 325 MG/1
650 TABLET ORAL EVERY 4 HOURS PRN
Status: DISCONTINUED | OUTPATIENT
Start: 2023-03-14 | End: 2023-03-17 | Stop reason: HOSPADM

## 2023-03-14 RX ORDER — HYDRALAZINE HYDROCHLORIDE 25 MG/1
25 TABLET, FILM COATED ORAL EVERY 8 HOURS PRN
Status: DISCONTINUED | OUTPATIENT
Start: 2023-03-14 | End: 2023-03-17 | Stop reason: HOSPADM

## 2023-03-14 RX ORDER — LOSARTAN POTASSIUM 50 MG/1
100 TABLET ORAL ONCE
Status: COMPLETED | OUTPATIENT
Start: 2023-03-14 | End: 2023-03-14

## 2023-03-14 RX ORDER — LOSARTAN POTASSIUM 50 MG/1
100 TABLET ORAL DAILY
Status: DISCONTINUED | OUTPATIENT
Start: 2023-03-15 | End: 2023-03-17 | Stop reason: HOSPADM

## 2023-03-14 RX ORDER — ROSUVASTATIN CALCIUM 10 MG/1
1 TABLET, COATED ORAL
COMMUNITY
Start: 2023-01-12

## 2023-03-14 RX ORDER — AMLODIPINE BESYLATE 5 MG/1
5 TABLET ORAL DAILY
Status: DISCONTINUED | OUTPATIENT
Start: 2023-03-15 | End: 2023-03-14

## 2023-03-14 RX ORDER — ASPIRIN 325 MG
325 TABLET, DELAYED RELEASE (ENTERIC COATED) ORAL ONCE
Status: ON HOLD | COMMUNITY
End: 2023-03-17 | Stop reason: HOSPADM

## 2023-03-14 RX ORDER — EZETIMIBE 10 MG/1
10 TABLET ORAL DAILY
Status: DISCONTINUED | OUTPATIENT
Start: 2023-03-14 | End: 2023-03-14

## 2023-03-14 RX ORDER — POLYETHYLENE GLYCOL 3350 17 G/17G
17 POWDER, FOR SOLUTION ORAL DAILY PRN
Status: DISCONTINUED | OUTPATIENT
Start: 2023-03-14 | End: 2023-03-17 | Stop reason: HOSPADM

## 2023-03-14 RX ORDER — ROSUVASTATIN CALCIUM 10 MG/1
10 TABLET, COATED ORAL
Status: DISCONTINUED | OUTPATIENT
Start: 2023-03-14 | End: 2023-03-17 | Stop reason: HOSPADM

## 2023-03-14 RX ORDER — AMLODIPINE BESYLATE 5 MG/1
5 TABLET ORAL ONCE
Status: COMPLETED | OUTPATIENT
Start: 2023-03-14 | End: 2023-03-14

## 2023-03-14 RX ORDER — EZETIMIBE 10 MG/1
10 TABLET ORAL DAILY
Status: DISCONTINUED | OUTPATIENT
Start: 2023-03-14 | End: 2023-03-17 | Stop reason: HOSPADM

## 2023-03-14 RX ORDER — AMLODIPINE BESYLATE 10 MG/1
10 TABLET ORAL DAILY
Status: DISCONTINUED | OUTPATIENT
Start: 2023-03-15 | End: 2023-03-17 | Stop reason: HOSPADM

## 2023-03-14 RX ORDER — ONDANSETRON 2 MG/ML
4 INJECTION INTRAMUSCULAR; INTRAVENOUS 2 TIMES DAILY PRN
Status: DISCONTINUED | OUTPATIENT
Start: 2023-03-14 | End: 2023-03-17 | Stop reason: HOSPADM

## 2023-03-14 RX ORDER — MAGNESIUM HYDROXIDE/ALUMINUM HYDROXICE/SIMETHICONE 120; 1200; 1200 MG/30ML; MG/30ML; MG/30ML
30 SUSPENSION ORAL EVERY 4 HOURS PRN
Status: DISCONTINUED | OUTPATIENT
Start: 2023-03-14 | End: 2023-03-17 | Stop reason: HOSPADM

## 2023-03-14 RX ORDER — PANTOPRAZOLE SODIUM 40 MG/1
40 TABLET, DELAYED RELEASE ORAL DAILY
Status: DISCONTINUED | OUTPATIENT
Start: 2023-03-14 | End: 2023-03-17 | Stop reason: HOSPADM

## 2023-03-14 RX ORDER — AMLODIPINE BESYLATE 5 MG/1
5 TABLET ORAL DAILY
Status: DISCONTINUED | OUTPATIENT
Start: 2023-03-14 | End: 2023-03-14

## 2023-03-14 RX ORDER — ESCITALOPRAM OXALATE 5 MG/1
5 TABLET ORAL PRN
Status: ON HOLD | COMMUNITY
End: 2023-06-01 | Stop reason: SDUPTHER

## 2023-03-14 RX ORDER — CLOPIDOGREL BISULFATE 75 MG/1
75 TABLET ORAL DAILY
Status: DISCONTINUED | OUTPATIENT
Start: 2023-03-14 | End: 2023-03-16 | Stop reason: SDUPTHER

## 2023-03-14 RX ORDER — METOPROLOL SUCCINATE 25 MG/1
25 TABLET, EXTENDED RELEASE ORAL DAILY
Status: DISCONTINUED | OUTPATIENT
Start: 2023-03-14 | End: 2023-03-17 | Stop reason: HOSPADM

## 2023-03-14 RX ORDER — HYDRALAZINE HYDROCHLORIDE 10 MG/1
25 TABLET, FILM COATED ORAL ONCE
Status: COMPLETED | OUTPATIENT
Start: 2023-03-14 | End: 2023-03-14

## 2023-03-14 RX ORDER — CHLORAL HYDRATE 500 MG
1 CAPSULE ORAL DAILY
COMMUNITY

## 2023-03-14 RX ORDER — ESCITALOPRAM OXALATE 5 MG/1
5 TABLET ORAL DAILY
Status: DISCONTINUED | OUTPATIENT
Start: 2023-03-14 | End: 2023-03-17 | Stop reason: HOSPADM

## 2023-03-14 RX ORDER — 0.9 % SODIUM CHLORIDE 0.9 %
2 VIAL (ML) INJECTION EVERY 12 HOURS SCHEDULED
Status: DISCONTINUED | OUTPATIENT
Start: 2023-03-14 | End: 2023-03-17 | Stop reason: HOSPADM

## 2023-03-14 RX ORDER — ENOXAPARIN SODIUM 100 MG/ML
40 INJECTION SUBCUTANEOUS EVERY EVENING
Status: DISCONTINUED | OUTPATIENT
Start: 2023-03-14 | End: 2023-03-17 | Stop reason: HOSPADM

## 2023-03-14 RX ADMIN — ESCITALOPRAM OXALATE 5 MG: 5 TABLET, FILM COATED ORAL at 15:36

## 2023-03-14 RX ADMIN — ENOXAPARIN SODIUM 40 MG: 40 INJECTION SUBCUTANEOUS at 20:30

## 2023-03-14 RX ADMIN — LOSARTAN POTASSIUM 50 MG: 50 TABLET, FILM COATED ORAL at 09:18

## 2023-03-14 RX ADMIN — HYDRALAZINE HYDROCHLORIDE 25 MG: 10 TABLET, FILM COATED ORAL at 09:18

## 2023-03-14 RX ADMIN — EZETIMIBE 10 MG: 10 TABLET ORAL at 15:36

## 2023-03-14 RX ADMIN — METOPROLOL SUCCINATE 25 MG: 25 TABLET, EXTENDED RELEASE ORAL at 15:36

## 2023-03-14 RX ADMIN — PANTOPRAZOLE SODIUM 40 MG: 40 TABLET, DELAYED RELEASE ORAL at 15:37

## 2023-03-14 RX ADMIN — AMLODIPINE BESYLATE 5 MG: 5 TABLET ORAL at 09:20

## 2023-03-14 RX ADMIN — GADOBUTROL 10 ML: 604.72 INJECTION INTRAVENOUS at 10:19

## 2023-03-14 RX ADMIN — SODIUM CHLORIDE, PRESERVATIVE FREE 2 ML: 5 INJECTION INTRAVENOUS at 20:31

## 2023-03-14 RX ADMIN — CLOPIDOGREL BISULFATE 75 MG: 75 TABLET, FILM COATED ORAL at 20:29

## 2023-03-14 RX ADMIN — SODIUM CHLORIDE, PRESERVATIVE FREE 2 ML: 5 INJECTION INTRAVENOUS at 15:39

## 2023-03-14 SDOH — ECONOMIC STABILITY: HOUSING INSECURITY: WHAT IS YOUR LIVING SITUATION TODAY?: HOUSE

## 2023-03-14 SDOH — HEALTH STABILITY: GENERAL: BECAUSE OF A PHYSICAL, MENTAL, OR EMOTIONAL CONDITION, DO YOU HAVE DIFFICULTY DOING ERRANDS ALONE?: NO

## 2023-03-14 SDOH — HEALTH STABILITY: PHYSICAL HEALTH: DO YOU HAVE SERIOUS DIFFICULTY WALKING OR CLIMBING STAIRS?: NO

## 2023-03-14 SDOH — HEALTH STABILITY: GENERAL
BECAUSE OF A PHYSICAL, MENTAL, OR EMOTIONAL CONDITION, DO YOU HAVE SERIOUS DIFFICULTY CONCENTRATING, REMEMBERING OR MAKING DECISIONS?: NO

## 2023-03-14 SDOH — ECONOMIC STABILITY: GENERAL

## 2023-03-14 SDOH — SOCIAL STABILITY: SOCIAL NETWORK
HOW OFTEN DO YOU SEE OR TALK TO PEOPLE THAT YOU CARE ABOUT AND FEEL CLOSE TO? (FOR EXAMPLE: TALKING TO FRIENDS ON THE PHONE, VISITING FRIENDS OR FAMILY, GOING TO CHURCH OR CLUB MEETINGS): 3 TO 5 TIMES A WEEK

## 2023-03-14 SDOH — SOCIAL STABILITY: SOCIAL NETWORK

## 2023-03-14 SDOH — ECONOMIC STABILITY: TRANSPORTATION INSECURITY
IN THE PAST 12 MONTHS, HAS LACK OF TRANSPORTATION KEPT YOU FROM MEETINGS, WORK, OR FROM GETTING THINGS NEEDED FOR DAILY LIVING?: NO

## 2023-03-14 SDOH — ECONOMIC STABILITY: HOUSING INSECURITY: WHAT IS YOUR LIVING SITUATION TODAY?: FAMILY MEMBERS;ADULT CHILDREN

## 2023-03-14 SDOH — ECONOMIC STABILITY: TRANSPORTATION INSECURITY
IN THE PAST 12 MONTHS, HAS THE LACK OF TRANSPORTATION KEPT YOU FROM MEDICAL APPOINTMENTS OR FROM GETTING MEDICATIONS?: NO

## 2023-03-14 SDOH — ECONOMIC STABILITY: FOOD INSECURITY: HOW OFTEN IN THE PAST 12 MONTHS WERE YOU WORRIED OR STRESSED ABOUT HAVING ENOUGH MONEY TO BUY NUTRITIOUS MEALS?: NEVER

## 2023-03-14 SDOH — ECONOMIC STABILITY: HOUSING INSECURITY: ARE YOU WORRIED ABOUT LOSING YOUR HOUSING?: NO

## 2023-03-14 SDOH — HEALTH STABILITY: PHYSICAL HEALTH: DO YOU HAVE DIFFICULTY DRESSING OR BATHING?: NO

## 2023-03-14 ASSESSMENT — PATIENT HEALTH QUESTIONNAIRE - PHQ9
SUM OF ALL RESPONSES TO PHQ9 QUESTIONS 1 AND 2: 0
IS PATIENT ABLE TO COMPLETE PHQ2 OR PHQ9: YES
1. LITTLE INTEREST OR PLEASURE IN DOING THINGS: NOT AT ALL
SUM OF ALL RESPONSES TO PHQ9 QUESTIONS 1 AND 2: 0
CLINICAL INTERPRETATION OF PHQ2 SCORE: NO FURTHER SCREENING NEEDED
2. FEELING DOWN, DEPRESSED OR HOPELESS: NOT AT ALL

## 2023-03-14 ASSESSMENT — HEART SCORE
HEART SCORE: 5
HISTORY: SLIGHTLY SUSPICIOUS
AGE: EQUAL OR GREATER THAN 65
TROPONIN: EQUAL OR LESS THAN NORMAL LIMIT
EKG: NON SPECIFIC REPOLARIZATION DISTURBANCE
RISK FACTORS: EQUAL OR GREATER  THAN 3 RISK FACTORS OR HISTORY OF ATHEROSCLEROTIC DISEASE

## 2023-03-14 ASSESSMENT — ENCOUNTER SYMPTOMS
NAUSEA: 0
ABDOMINAL PAIN: 0
FEVER: 0
BRUISES/BLEEDS EASILY: 1
LIGHT-HEADEDNESS: 1
VOMITING: 0
SHORTNESS OF BREATH: 0

## 2023-03-14 ASSESSMENT — COLUMBIA-SUICIDE SEVERITY RATING SCALE - C-SSRS
6. HAVE YOU EVER DONE ANYTHING, STARTED TO DO ANYTHING, OR PREPARED TO DO ANYTHING TO END YOUR LIFE?: NO
IS THE PATIENT ABLE TO COMPLETE C-SSRS: YES
2. HAVE YOU ACTUALLY HAD ANY THOUGHTS OF KILLING YOURSELF?: NO
1. WITHIN THE PAST MONTH, HAVE YOU WISHED YOU WERE DEAD OR WISHED YOU COULD GO TO SLEEP AND NOT WAKE UP?: NO

## 2023-03-14 ASSESSMENT — PAIN SCALES - GENERAL
PAINLEVEL_OUTOF10: 0
PAINLEVEL_OUTOF10: 0
PAINLEVEL_OUTOF10: 2

## 2023-03-14 ASSESSMENT — LIFESTYLE VARIABLES
HOW OFTEN DO YOU HAVE 6 OR MORE DRINKS ON ONE OCCASION: NEVER
AUDIT-C TOTAL SCORE: 0
HOW OFTEN DO YOU HAVE A DRINK CONTAINING ALCOHOL: NEVER
HOW MANY STANDARD DRINKS CONTAINING ALCOHOL DO YOU HAVE ON A TYPICAL DAY: 0,1 OR 2
ALCOHOL_USE_STATUS: NO OR LOW RISK WITH VALIDATED TOOL

## 2023-03-14 ASSESSMENT — ACTIVITIES OF DAILY LIVING (ADL)
ADL_BEFORE_ADMISSION: INDEPENDENT
ADL_SHORT_OF_BREATH: NO
ADL_SCORE: 12
RECENT_DECLINE_ADL: NO

## 2023-03-15 ENCOUNTER — APPOINTMENT (OUTPATIENT)
Dept: CT IMAGING | Age: 83
DRG: 247 | End: 2023-03-15
Attending: INTERNAL MEDICINE

## 2023-03-15 ENCOUNTER — HOSPITAL ENCOUNTER (INPATIENT)
Age: 83
DRG: 247 | End: 2023-03-15
Attending: INTERNAL MEDICINE | Admitting: INTERNAL MEDICINE

## 2023-03-15 ENCOUNTER — APPOINTMENT (OUTPATIENT)
Dept: CARDIOLOGY | Age: 83
DRG: 247 | End: 2023-03-15
Attending: HOSPITALIST

## 2023-03-15 LAB
ANION GAP SERPL CALC-SCNC: 8 MMOL/L (ref 7–19)
ATRIAL RATE (BPM): 65
ATRIAL RATE (BPM): 67
BASOPHILS # BLD: 0 K/MCL (ref 0–0.3)
BASOPHILS NFR BLD: 1 %
BUN SERPL-MCNC: 26 MG/DL (ref 6–20)
BUN/CREAT SERPL: 18 (ref 7–25)
CALCIUM SERPL-MCNC: 8.8 MG/DL (ref 8.4–10.2)
CHLORIDE SERPL-SCNC: 112 MMOL/L (ref 97–110)
CO2 SERPL-SCNC: 24 MMOL/L (ref 21–32)
CREAT SERPL-MCNC: 1.46 MG/DL (ref 0.67–1.17)
DEPRECATED RDW RBC: 48.1 FL (ref 39–50)
EOSINOPHIL # BLD: 0.3 K/MCL (ref 0–0.5)
EOSINOPHIL NFR BLD: 6 %
ERYTHROCYTE [DISTWIDTH] IN BLOOD: 13.4 % (ref 11–15)
FASTING DURATION TIME PATIENT: ABNORMAL H
GFR SERPLBLD BASED ON 1.73 SQ M-ARVRAT: 47 ML/MIN
GLUCOSE SERPL-MCNC: 101 MG/DL (ref 70–99)
HCT VFR BLD CALC: 33.1 % (ref 39–51)
HGB BLD-MCNC: 11.1 G/DL (ref 13–17)
IMM GRANULOCYTES # BLD AUTO: 0 K/MCL (ref 0–0.2)
IMM GRANULOCYTES # BLD: 0 %
LYMPHOCYTES # BLD: 1.4 K/MCL (ref 1–4)
LYMPHOCYTES NFR BLD: 30 %
MAGNESIUM SERPL-MCNC: 2.2 MG/DL (ref 1.7–2.4)
MCH RBC QN AUTO: 32.5 PG (ref 26–34)
MCHC RBC AUTO-ENTMCNC: 33.5 G/DL (ref 32–36.5)
MCV RBC AUTO: 96.8 FL (ref 78–100)
MONOCYTES # BLD: 0.6 K/MCL (ref 0.3–0.9)
MONOCYTES NFR BLD: 13 %
NEUTROPHILS # BLD: 2.3 K/MCL (ref 1.8–7.7)
NEUTROPHILS NFR BLD: 50 %
NRBC BLD MANUAL-RTO: 0 /100 WBC
P AXIS (DEGREES): 33
P AXIS (DEGREES): 61
PLATELET # BLD AUTO: 157 K/MCL (ref 140–450)
POTASSIUM SERPL-SCNC: 4.2 MMOL/L (ref 3.4–5.1)
PR-INTERVAL (MSEC): 140
PR-INTERVAL (MSEC): 146
QRS-INTERVAL (MSEC): 88
QRS-INTERVAL (MSEC): 92
QT-INTERVAL (MSEC): 412
QT-INTERVAL (MSEC): 434
QTC: 435
QTC: 451
R AXIS (DEGREES): 22
R AXIS (DEGREES): 40
RBC # BLD: 3.42 MIL/MCL (ref 4.5–5.9)
REPORT TEXT: NORMAL
REPORT TEXT: NORMAL
SODIUM SERPL-SCNC: 140 MMOL/L (ref 135–145)
STRESS TARGET HR: 137 BPM
T AXIS (DEGREES): 19
T AXIS (DEGREES): 3
TROPONIN I SERPL DL<=0.01 NG/ML-MCNC: 16 NG/L
VENTRICULAR RATE EKG/MIN (BPM): 65
VENTRICULAR RATE EKG/MIN (BPM): 67
WBC # BLD: 4.5 K/MCL (ref 4.2–11)

## 2023-03-15 PROCEDURE — 93005 ELECTROCARDIOGRAM TRACING: CPT | Performed by: HOSPITALIST

## 2023-03-15 PROCEDURE — 10002807 HB RX 258: Performed by: PHYSICIAN ASSISTANT

## 2023-03-15 PROCEDURE — G1004 CDSM NDSC: HCPCS

## 2023-03-15 PROCEDURE — 10006150 HB RX 343: Performed by: HOSPITALIST

## 2023-03-15 PROCEDURE — 71275 CT ANGIOGRAPHY CHEST: CPT

## 2023-03-15 PROCEDURE — 93017 CV STRESS TEST TRACING ONLY: CPT

## 2023-03-15 PROCEDURE — A9500 TC99M SESTAMIBI: HCPCS | Performed by: HOSPITALIST

## 2023-03-15 PROCEDURE — 10004651 HB RX, NO CHARGE ITEM: Performed by: HOSPITALIST

## 2023-03-15 PROCEDURE — 36415 COLL VENOUS BLD VENIPUNCTURE: CPT | Performed by: HOSPITALIST

## 2023-03-15 PROCEDURE — 80048 BASIC METABOLIC PNL TOTAL CA: CPT | Performed by: HOSPITALIST

## 2023-03-15 PROCEDURE — G0378 HOSPITAL OBSERVATION PER HR: HCPCS

## 2023-03-15 PROCEDURE — 84484 ASSAY OF TROPONIN QUANT: CPT | Performed by: INTERNAL MEDICINE

## 2023-03-15 PROCEDURE — 10003585 HB ROOM CHARGE INTERMEDIATE CARE

## 2023-03-15 PROCEDURE — 10002800 HB RX 250 W HCPCS: Performed by: HOSPITALIST

## 2023-03-15 PROCEDURE — 10002803 HB RX 637: Performed by: INTERNAL MEDICINE

## 2023-03-15 PROCEDURE — 10002805 HB CONTRAST AGENT: Performed by: INTERNAL MEDICINE

## 2023-03-15 PROCEDURE — 78452 HT MUSCLE IMAGE SPECT MULT: CPT

## 2023-03-15 PROCEDURE — 83735 ASSAY OF MAGNESIUM: CPT | Performed by: HOSPITALIST

## 2023-03-15 PROCEDURE — 10002803 HB RX 637: Performed by: HOSPITALIST

## 2023-03-15 PROCEDURE — 85025 COMPLETE CBC W/AUTO DIFF WBC: CPT | Performed by: HOSPITALIST

## 2023-03-15 PROCEDURE — 10002800 HB RX 250 W HCPCS: Performed by: PHYSICIAN ASSISTANT

## 2023-03-15 RX ORDER — TETRAKIS(2-METHOXYISOBUTYLISOCYANIDE)COPPER(I) TETRAFLUOROBORATE 1 MG/ML
8 INJECTION, POWDER, LYOPHILIZED, FOR SOLUTION INTRAVENOUS ONCE
Status: COMPLETED | OUTPATIENT
Start: 2023-03-15 | End: 2023-03-15

## 2023-03-15 RX ORDER — SODIUM CHLORIDE 9 MG/ML
INJECTION, SOLUTION INTRAVENOUS CONTINUOUS
Status: DISCONTINUED | OUTPATIENT
Start: 2023-03-15 | End: 2023-03-17

## 2023-03-15 RX ORDER — TETRAKIS(2-METHOXYISOBUTYLISOCYANIDE)COPPER(I) TETRAFLUOROBORATE 1 MG/ML
24 INJECTION, POWDER, LYOPHILIZED, FOR SOLUTION INTRAVENOUS ONCE
Status: COMPLETED | OUTPATIENT
Start: 2023-03-15 | End: 2023-03-15

## 2023-03-15 RX ORDER — CLONIDINE HYDROCHLORIDE 0.1 MG/1
0.1 TABLET ORAL EVERY 4 HOURS PRN
Status: ACTIVE | OUTPATIENT
Start: 2023-03-15 | End: 2023-03-16

## 2023-03-15 RX ORDER — HYDRALAZINE HYDROCHLORIDE 25 MG/1
25 TABLET, FILM COATED ORAL 3 TIMES DAILY
Status: DISCONTINUED | OUTPATIENT
Start: 2023-03-15 | End: 2023-03-16

## 2023-03-15 RX ORDER — HYDRALAZINE HYDROCHLORIDE 20 MG/ML
10 INJECTION INTRAMUSCULAR; INTRAVENOUS EVERY 4 HOURS PRN
Status: DISPENSED | OUTPATIENT
Start: 2023-03-15 | End: 2023-03-16

## 2023-03-15 RX ORDER — REGADENOSON 0.08 MG/ML
0.4 INJECTION, SOLUTION INTRAVENOUS ONCE
Status: COMPLETED | OUTPATIENT
Start: 2023-03-15 | End: 2023-03-15

## 2023-03-15 RX ADMIN — HYDRALAZINE HYDROCHLORIDE 25 MG: 50 TABLET, FILM COATED ORAL at 03:17

## 2023-03-15 RX ADMIN — LOSARTAN POTASSIUM 100 MG: 50 TABLET, FILM COATED ORAL at 08:48

## 2023-03-15 RX ADMIN — HYDRALAZINE HYDROCHLORIDE 25 MG: 50 TABLET ORAL at 21:06

## 2023-03-15 RX ADMIN — METOPROLOL SUCCINATE 25 MG: 25 TABLET, EXTENDED RELEASE ORAL at 08:44

## 2023-03-15 RX ADMIN — TETRAKIS(2-METHOXYISOBUTYLISOCYANIDE)COPPER(I) TETRAFLUOROBORATE 7.4 MILLICURIE: 1 INJECTION, POWDER, LYOPHILIZED, FOR SOLUTION INTRAVENOUS at 11:44

## 2023-03-15 RX ADMIN — REGADENOSON 0.4 MG: 0.08 INJECTION, SOLUTION INTRAVENOUS at 12:58

## 2023-03-15 RX ADMIN — TETRAKIS(2-METHOXYISOBUTYLISOCYANIDE)COPPER(I) TETRAFLUOROBORATE 21 MILLICURIE: 1 INJECTION, POWDER, LYOPHILIZED, FOR SOLUTION INTRAVENOUS at 12:58

## 2023-03-15 RX ADMIN — HYDRALAZINE HYDROCHLORIDE 10 MG: 20 INJECTION, SOLUTION INTRAMUSCULAR; INTRAVENOUS at 23:47

## 2023-03-15 RX ADMIN — AMLODIPINE BESYLATE 10 MG: 10 TABLET ORAL at 08:48

## 2023-03-15 RX ADMIN — ASPIRIN 81 MG: 81 TABLET, COATED ORAL at 08:44

## 2023-03-15 RX ADMIN — SODIUM CHLORIDE, PRESERVATIVE FREE 2 ML: 5 INJECTION INTRAVENOUS at 08:51

## 2023-03-15 RX ADMIN — ENOXAPARIN SODIUM 40 MG: 40 INJECTION SUBCUTANEOUS at 17:46

## 2023-03-15 RX ADMIN — CLOPIDOGREL BISULFATE 75 MG: 75 TABLET, FILM COATED ORAL at 08:46

## 2023-03-15 RX ADMIN — ESCITALOPRAM OXALATE 5 MG: 5 TABLET, FILM COATED ORAL at 08:44

## 2023-03-15 RX ADMIN — HYDRALAZINE HYDROCHLORIDE 25 MG: 50 TABLET ORAL at 14:00

## 2023-03-15 RX ADMIN — IOHEXOL 70 ML: 350 INJECTION, SOLUTION INTRAVENOUS at 09:12

## 2023-03-15 RX ADMIN — PANTOPRAZOLE SODIUM 40 MG: 40 TABLET, DELAYED RELEASE ORAL at 08:44

## 2023-03-15 RX ADMIN — NITROGLYCERIN 1 INCH: 20 OINTMENT TOPICAL at 04:31

## 2023-03-15 RX ADMIN — PANCRELIPASE 2 CAPSULE: 60000; 12000; 38000 CAPSULE, DELAYED RELEASE PELLETS ORAL at 08:48

## 2023-03-15 RX ADMIN — EZETIMIBE 10 MG: 10 TABLET ORAL at 08:47

## 2023-03-15 RX ADMIN — SODIUM CHLORIDE: 9 INJECTION, SOLUTION INTRAVENOUS at 21:08

## 2023-03-15 RX ADMIN — HYDRALAZINE HYDROCHLORIDE 25 MG: 50 TABLET, FILM COATED ORAL at 08:47

## 2023-03-15 RX ADMIN — PANCRELIPASE 2 CAPSULE: 60000; 12000; 38000 CAPSULE, DELAYED RELEASE PELLETS ORAL at 17:45

## 2023-03-15 RX ADMIN — HYDRALAZINE HYDROCHLORIDE 25 MG: 50 TABLET ORAL at 08:50

## 2023-03-15 ASSESSMENT — COGNITIVE AND FUNCTIONAL STATUS - GENERAL
DO YOU HAVE SERIOUS DIFFICULTY WALKING OR CLIMBING STAIRS: NO
BECAUSE OF A PHYSICAL, MENTAL, OR EMOTIONAL CONDITION, DO YOU HAVE SERIOUS DIFFICULTY CONCENTRATING, REMEMBERING OR MAKING DECISIONS: NO
BECAUSE OF A PHYSICAL, MENTAL, OR EMOTIONAL CONDITION, DO YOU HAVE DIFFICULTY DOING ERRANDS ALONE: NO
DO YOU HAVE DIFFICULTY DRESSING OR BATHING: NO

## 2023-03-15 ASSESSMENT — PAIN SCALES - GENERAL
PAINLEVEL_OUTOF10: 0
PAINLEVEL_OUTOF10: 0

## 2023-03-16 PROBLEM — Z98.61 S/P PTCA (PERCUTANEOUS TRANSLUMINAL CORONARY ANGIOPLASTY): Status: ACTIVE | Noted: 2023-03-16

## 2023-03-16 LAB
ANION GAP SERPL CALC-SCNC: 8 MMOL/L (ref 7–19)
BUN SERPL-MCNC: 22 MG/DL (ref 6–20)
BUN/CREAT SERPL: 15 (ref 7–25)
CALCIUM SERPL-MCNC: 8.8 MG/DL (ref 8.4–10.2)
CHLORIDE SERPL-SCNC: 112 MMOL/L (ref 97–110)
CO2 SERPL-SCNC: 23 MMOL/L (ref 21–32)
CREAT SERPL-MCNC: 1.43 MG/DL (ref 0.67–1.17)
FASTING DURATION TIME PATIENT: ABNORMAL H
GFR SERPLBLD BASED ON 1.73 SQ M-ARVRAT: 49 ML/MIN
GLUCOSE SERPL-MCNC: 102 MG/DL (ref 70–99)
POTASSIUM SERPL-SCNC: 3.8 MMOL/L (ref 3.4–5.1)
SODIUM SERPL-SCNC: 139 MMOL/L (ref 135–145)

## 2023-03-16 PROCEDURE — 99152 MOD SED SAME PHYS/QHP 5/>YRS: CPT | Performed by: INTERNAL MEDICINE

## 2023-03-16 PROCEDURE — C1894 INTRO/SHEATH, NON-LASER: HCPCS | Performed by: INTERNAL MEDICINE

## 2023-03-16 PROCEDURE — C1769 GUIDE WIRE: HCPCS | Performed by: INTERNAL MEDICINE

## 2023-03-16 PROCEDURE — C1760 CLOSURE DEV, VASC: HCPCS | Performed by: INTERNAL MEDICINE

## 2023-03-16 PROCEDURE — 10002803 HB RX 637: Performed by: INTERNAL MEDICINE

## 2023-03-16 PROCEDURE — B2181ZZ FLUOROSCOPY OF LEFT INTERNAL MAMMARY BYPASS GRAFT USING LOW OSMOLAR CONTRAST: ICD-10-PCS | Performed by: INTERNAL MEDICINE

## 2023-03-16 PROCEDURE — B2131ZZ FLUOROSCOPY OF MULTIPLE CORONARY ARTERY BYPASS GRAFTS USING LOW OSMOLAR CONTRAST: ICD-10-PCS | Performed by: INTERNAL MEDICINE

## 2023-03-16 PROCEDURE — B2111ZZ FLUOROSCOPY OF MULTIPLE CORONARY ARTERIES USING LOW OSMOLAR CONTRAST: ICD-10-PCS | Performed by: INTERNAL MEDICINE

## 2023-03-16 PROCEDURE — 10002807 HB RX 258: Performed by: INTERNAL MEDICINE

## 2023-03-16 PROCEDURE — 99153 MOD SED SAME PHYS/QHP EA: CPT | Performed by: INTERNAL MEDICINE

## 2023-03-16 PROCEDURE — 36415 COLL VENOUS BLD VENIPUNCTURE: CPT | Performed by: HOSPITALIST

## 2023-03-16 PROCEDURE — 10002800 HB RX 250 W HCPCS: Performed by: INTERNAL MEDICINE

## 2023-03-16 PROCEDURE — 10004651 HB RX, NO CHARGE ITEM: Performed by: HOSPITALIST

## 2023-03-16 PROCEDURE — C1874 STENT, COATED/COV W/DEL SYS: HCPCS | Performed by: INTERNAL MEDICINE

## 2023-03-16 PROCEDURE — 10003585 HB ROOM CHARGE INTERMEDIATE CARE

## 2023-03-16 PROCEDURE — 93459 L HRT ART/GRFT ANGIO: CPT | Performed by: INTERNAL MEDICINE

## 2023-03-16 PROCEDURE — 10004651 HB RX, NO CHARGE ITEM: Performed by: INTERNAL MEDICINE

## 2023-03-16 PROCEDURE — C1887 CATHETER, GUIDING: HCPCS | Performed by: INTERNAL MEDICINE

## 2023-03-16 PROCEDURE — 4A023N7 MEASUREMENT OF CARDIAC SAMPLING AND PRESSURE, LEFT HEART, PERCUTANEOUS APPROACH: ICD-10-PCS | Performed by: INTERNAL MEDICINE

## 2023-03-16 PROCEDURE — 10002803 HB RX 637: Performed by: HOSPITALIST

## 2023-03-16 PROCEDURE — 10006027 HB SUPPLY 278: Performed by: INTERNAL MEDICINE

## 2023-03-16 PROCEDURE — 10002805 HB CONTRAST AGENT: Performed by: INTERNAL MEDICINE

## 2023-03-16 PROCEDURE — 027034Z DILATION OF CORONARY ARTERY, ONE ARTERY WITH DRUG-ELUTING INTRALUMINAL DEVICE, PERCUTANEOUS APPROACH: ICD-10-PCS | Performed by: INTERNAL MEDICINE

## 2023-03-16 PROCEDURE — 10002801 HB RX 250 W/O HCPCS: Performed by: INTERNAL MEDICINE

## 2023-03-16 PROCEDURE — 80048 BASIC METABOLIC PNL TOTAL CA: CPT | Performed by: HOSPITALIST

## 2023-03-16 PROCEDURE — 92937 PRQ TRLUML REVSC CAB GRF 1: CPT | Performed by: INTERNAL MEDICINE

## 2023-03-16 PROCEDURE — 10006023 HB SUPPLY 272: Performed by: INTERNAL MEDICINE

## 2023-03-16 DEVICE — STENT RONYX35030UX RESOLUTE ONYX 3.50X30
Type: IMPLANTABLE DEVICE | Site: VEIN GRAFT | Status: FUNCTIONAL
Brand: RESOLUTE ONYX™

## 2023-03-16 DEVICE — ANGIO-SEAL VIP VASCULAR CLOSURE DEVICE
Type: IMPLANTABLE DEVICE | Site: GROIN | Status: FUNCTIONAL
Brand: ANGIO-SEAL

## 2023-03-16 RX ORDER — ASPIRIN 81 MG/1
81 TABLET, CHEWABLE ORAL DAILY
Status: DISCONTINUED | OUTPATIENT
Start: 2023-03-17 | End: 2023-03-17 | Stop reason: HOSPADM

## 2023-03-16 RX ORDER — MIDAZOLAM HYDROCHLORIDE 1 MG/ML
INJECTION, SOLUTION INTRAMUSCULAR; INTRAVENOUS PRN
Status: DISCONTINUED | OUTPATIENT
Start: 2023-03-16 | End: 2023-03-16 | Stop reason: HOSPADM

## 2023-03-16 RX ORDER — HYDRALAZINE HYDROCHLORIDE 50 MG/1
50 TABLET, FILM COATED ORAL 3 TIMES DAILY
Status: DISCONTINUED | OUTPATIENT
Start: 2023-03-16 | End: 2023-03-17 | Stop reason: HOSPADM

## 2023-03-16 RX ORDER — HYDRALAZINE HYDROCHLORIDE 20 MG/ML
10 INJECTION INTRAMUSCULAR; INTRAVENOUS EVERY 4 HOURS PRN
Status: ACTIVE | OUTPATIENT
Start: 2023-03-16 | End: 2023-03-17

## 2023-03-16 RX ORDER — ACETAMINOPHEN 325 MG/1
650 TABLET ORAL EVERY 4 HOURS PRN
Status: DISCONTINUED | OUTPATIENT
Start: 2023-03-16 | End: 2023-03-16 | Stop reason: SDUPTHER

## 2023-03-16 RX ORDER — 0.9 % SODIUM CHLORIDE 0.9 %
2 VIAL (ML) INJECTION EVERY 12 HOURS SCHEDULED
Status: DISCONTINUED | OUTPATIENT
Start: 2023-03-16 | End: 2023-03-17 | Stop reason: HOSPADM

## 2023-03-16 RX ORDER — CLONIDINE HYDROCHLORIDE 0.1 MG/1
0.1 TABLET ORAL EVERY 4 HOURS PRN
Status: ACTIVE | OUTPATIENT
Start: 2023-03-16 | End: 2023-03-17

## 2023-03-16 RX ORDER — SODIUM CHLORIDE 9 MG/ML
INJECTION, SOLUTION INTRAVENOUS CONTINUOUS
Status: ACTIVE | OUTPATIENT
Start: 2023-03-16 | End: 2023-03-16

## 2023-03-16 RX ORDER — CLOPIDOGREL BISULFATE 75 MG/1
75 TABLET ORAL DAILY
Status: DISCONTINUED | OUTPATIENT
Start: 2023-03-17 | End: 2023-03-17 | Stop reason: HOSPADM

## 2023-03-16 RX ORDER — NITROGLYCERIN 0.4 MG/1
0.4 TABLET SUBLINGUAL EVERY 5 MIN PRN
Status: ACTIVE | OUTPATIENT
Start: 2023-03-16 | End: 2023-03-17

## 2023-03-16 RX ORDER — ACETAMINOPHEN 650 MG/1
650 SUPPOSITORY RECTAL EVERY 4 HOURS PRN
Status: DISCONTINUED | OUTPATIENT
Start: 2023-03-16 | End: 2023-03-17 | Stop reason: HOSPADM

## 2023-03-16 RX ORDER — LIDOCAINE HYDROCHLORIDE 20 MG/ML
INJECTION, SOLUTION EPIDURAL; INFILTRATION; INTRACAUDAL; PERINEURAL PRN
Status: DISCONTINUED | OUTPATIENT
Start: 2023-03-16 | End: 2023-03-16 | Stop reason: HOSPADM

## 2023-03-16 RX ORDER — CLOPIDOGREL BISULFATE 75 MG/1
TABLET ORAL PRN
Status: DISCONTINUED | OUTPATIENT
Start: 2023-03-16 | End: 2023-03-16 | Stop reason: HOSPADM

## 2023-03-16 RX ORDER — IODIXANOL 320 MG/ML
INJECTION, SOLUTION INTRAVASCULAR PRN
Status: DISCONTINUED | OUTPATIENT
Start: 2023-03-16 | End: 2023-03-16 | Stop reason: HOSPADM

## 2023-03-16 RX ADMIN — ESCITALOPRAM OXALATE 5 MG: 5 TABLET, FILM COATED ORAL at 08:15

## 2023-03-16 RX ADMIN — PANCRELIPASE 2 CAPSULE: 60000; 12000; 38000 CAPSULE, DELAYED RELEASE PELLETS ORAL at 08:14

## 2023-03-16 RX ADMIN — PANCRELIPASE 2 CAPSULE: 60000; 12000; 38000 CAPSULE, DELAYED RELEASE PELLETS ORAL at 13:45

## 2023-03-16 RX ADMIN — PANTOPRAZOLE SODIUM 40 MG: 40 TABLET, DELAYED RELEASE ORAL at 08:19

## 2023-03-16 RX ADMIN — PANCRELIPASE 2 CAPSULE: 60000; 12000; 38000 CAPSULE, DELAYED RELEASE PELLETS ORAL at 17:55

## 2023-03-16 RX ADMIN — AMLODIPINE BESYLATE 10 MG: 10 TABLET ORAL at 08:15

## 2023-03-16 RX ADMIN — CLOPIDOGREL BISULFATE 75 MG: 75 TABLET, FILM COATED ORAL at 08:14

## 2023-03-16 RX ADMIN — METOPROLOL SUCCINATE 25 MG: 25 TABLET, EXTENDED RELEASE ORAL at 08:14

## 2023-03-16 RX ADMIN — SODIUM CHLORIDE, PRESERVATIVE FREE 2 ML: 5 INJECTION INTRAVENOUS at 22:21

## 2023-03-16 RX ADMIN — HYDRALAZINE HYDROCHLORIDE 25 MG: 50 TABLET ORAL at 08:15

## 2023-03-16 RX ADMIN — LOSARTAN POTASSIUM 100 MG: 50 TABLET, FILM COATED ORAL at 08:14

## 2023-03-16 RX ADMIN — HYDRALAZINE HYDROCHLORIDE 50 MG: 50 TABLET ORAL at 22:19

## 2023-03-16 RX ADMIN — EZETIMIBE 10 MG: 10 TABLET ORAL at 08:15

## 2023-03-16 RX ADMIN — ASPIRIN 81 MG: 81 TABLET, COATED ORAL at 08:14

## 2023-03-16 RX ADMIN — HYDRALAZINE HYDROCHLORIDE 50 MG: 50 TABLET ORAL at 13:45

## 2023-03-16 ASSESSMENT — PAIN SCALES - GENERAL
PAINLEVEL_OUTOF10: 0

## 2023-03-17 VITALS
HEIGHT: 72 IN | BODY MASS INDEX: 23.35 KG/M2 | OXYGEN SATURATION: 97 % | WEIGHT: 172.4 LBS | DIASTOLIC BLOOD PRESSURE: 59 MMHG | SYSTOLIC BLOOD PRESSURE: 102 MMHG | HEART RATE: 55 BPM | TEMPERATURE: 97.8 F | RESPIRATION RATE: 16 BRPM

## 2023-03-17 LAB
ANION GAP SERPL CALC-SCNC: 8 MMOL/L (ref 7–19)
BUN SERPL-MCNC: 19 MG/DL (ref 6–20)
BUN/CREAT SERPL: 14 (ref 7–25)
CALCIUM SERPL-MCNC: 8.4 MG/DL (ref 8.4–10.2)
CHLORIDE SERPL-SCNC: 112 MMOL/L (ref 97–110)
CO2 SERPL-SCNC: 24 MMOL/L (ref 21–32)
CREAT SERPL-MCNC: 1.37 MG/DL (ref 0.67–1.17)
DEPRECATED RDW RBC: 47.1 FL (ref 39–50)
ERYTHROCYTE [DISTWIDTH] IN BLOOD: 13.2 % (ref 11–15)
FASTING DURATION TIME PATIENT: ABNORMAL H
GFR SERPLBLD BASED ON 1.73 SQ M-ARVRAT: 51 ML/MIN
GLUCOSE SERPL-MCNC: 101 MG/DL (ref 70–99)
HCT VFR BLD CALC: 31.4 % (ref 39–51)
HGB BLD-MCNC: 10.5 G/DL (ref 13–17)
MCH RBC QN AUTO: 32 PG (ref 26–34)
MCHC RBC AUTO-ENTMCNC: 33.4 G/DL (ref 32–36.5)
MCV RBC AUTO: 95.7 FL (ref 78–100)
NRBC BLD MANUAL-RTO: 0 /100 WBC
PLATELET # BLD AUTO: 165 K/MCL (ref 140–450)
POTASSIUM SERPL-SCNC: 4.2 MMOL/L (ref 3.4–5.1)
RBC # BLD: 3.28 MIL/MCL (ref 4.5–5.9)
SODIUM SERPL-SCNC: 140 MMOL/L (ref 135–145)
WBC # BLD: 5.2 K/MCL (ref 4.2–11)

## 2023-03-17 PROCEDURE — 10002803 HB RX 637: Performed by: HOSPITALIST

## 2023-03-17 PROCEDURE — 80048 BASIC METABOLIC PNL TOTAL CA: CPT | Performed by: HOSPITALIST

## 2023-03-17 PROCEDURE — 10002803 HB RX 637: Performed by: INTERNAL MEDICINE

## 2023-03-17 PROCEDURE — 10004651 HB RX, NO CHARGE ITEM: Performed by: HOSPITALIST

## 2023-03-17 PROCEDURE — 10004651 HB RX, NO CHARGE ITEM: Performed by: INTERNAL MEDICINE

## 2023-03-17 PROCEDURE — 36415 COLL VENOUS BLD VENIPUNCTURE: CPT | Performed by: HOSPITALIST

## 2023-03-17 PROCEDURE — 85027 COMPLETE CBC AUTOMATED: CPT | Performed by: HOSPITALIST

## 2023-03-17 RX ORDER — HYDRALAZINE HYDROCHLORIDE 25 MG/1
25 TABLET, FILM COATED ORAL 3 TIMES DAILY
Qty: 90 TABLET | Refills: 0 | Status: SHIPPED | OUTPATIENT
Start: 2023-03-17 | End: 2023-05-31

## 2023-03-17 RX ORDER — AMLODIPINE BESYLATE 10 MG/1
10 TABLET ORAL DAILY
Qty: 30 TABLET | Refills: 0 | Status: ON HOLD | OUTPATIENT
Start: 2023-03-18 | End: 2023-09-06 | Stop reason: HOSPADM

## 2023-03-17 RX ORDER — LOSARTAN POTASSIUM 100 MG/1
100 TABLET ORAL DAILY
Qty: 30 TABLET | Refills: 0 | Status: SHIPPED | OUTPATIENT
Start: 2023-03-18

## 2023-03-17 RX ORDER — HYDRALAZINE HYDROCHLORIDE 25 MG/1
25 TABLET, FILM COATED ORAL EVERY 8 HOURS PRN
Qty: 30 TABLET | Refills: 0 | Status: ON HOLD | OUTPATIENT
Start: 2023-03-17 | End: 2023-06-01 | Stop reason: HOSPADM

## 2023-03-17 RX ADMIN — CLOPIDOGREL BISULFATE 75 MG: 75 TABLET, FILM COATED ORAL at 08:03

## 2023-03-17 RX ADMIN — ACETAMINOPHEN 650 MG: 325 TABLET ORAL at 12:08

## 2023-03-17 RX ADMIN — PANTOPRAZOLE SODIUM 40 MG: 40 TABLET, DELAYED RELEASE ORAL at 08:03

## 2023-03-17 RX ADMIN — EZETIMIBE 10 MG: 10 TABLET ORAL at 08:03

## 2023-03-17 RX ADMIN — HYDRALAZINE HYDROCHLORIDE 50 MG: 50 TABLET ORAL at 14:00

## 2023-03-17 RX ADMIN — SODIUM CHLORIDE, PRESERVATIVE FREE 2 ML: 5 INJECTION INTRAVENOUS at 08:03

## 2023-03-17 RX ADMIN — METOPROLOL SUCCINATE 25 MG: 25 TABLET, EXTENDED RELEASE ORAL at 08:03

## 2023-03-17 RX ADMIN — ASPIRIN 81 MG CHEWABLE TABLET 81 MG: 81 TABLET CHEWABLE at 08:03

## 2023-03-17 RX ADMIN — PANCRELIPASE 2 CAPSULE: 60000; 12000; 38000 CAPSULE, DELAYED RELEASE PELLETS ORAL at 12:08

## 2023-03-17 RX ADMIN — LOSARTAN POTASSIUM 100 MG: 50 TABLET, FILM COATED ORAL at 08:03

## 2023-03-17 RX ADMIN — AMLODIPINE BESYLATE 10 MG: 10 TABLET ORAL at 08:03

## 2023-03-17 RX ADMIN — HYDRALAZINE HYDROCHLORIDE 50 MG: 50 TABLET ORAL at 08:03

## 2023-03-17 RX ADMIN — PANCRELIPASE 2 CAPSULE: 60000; 12000; 38000 CAPSULE, DELAYED RELEASE PELLETS ORAL at 08:03

## 2023-03-17 RX ADMIN — ESCITALOPRAM OXALATE 5 MG: 5 TABLET, FILM COATED ORAL at 08:03

## 2023-03-17 ASSESSMENT — PAIN SCALES - GENERAL: PAINLEVEL_OUTOF10: 0

## 2023-03-27 ENCOUNTER — TELEPHONE (OUTPATIENT)
Dept: CARDIAC REHAB | Age: 83
End: 2023-03-27

## 2023-04-06 ENCOUNTER — TELEPHONE (OUTPATIENT)
Dept: CARDIAC REHAB | Age: 83
End: 2023-04-06

## 2023-04-06 DIAGNOSIS — Z95.5 S/P DRUG ELUTING CORONARY STENT PLACEMENT: Primary | ICD-10-CM

## 2023-04-26 ENCOUNTER — HOSPITAL ENCOUNTER (OUTPATIENT)
Dept: CARDIAC REHAB | Age: 83
Discharge: STILL ADMITTED OR ADMINISTRATIVE | End: 2023-04-26
Attending: INTERNAL MEDICINE

## 2023-04-26 VITALS — HEIGHT: 72 IN | BODY MASS INDEX: 23.38 KG/M2

## 2023-04-26 DIAGNOSIS — Z95.5 S/P DRUG ELUTING CORONARY STENT PLACEMENT: ICD-10-CM

## 2023-04-26 PROCEDURE — 93798 PHYS/QHP OP CAR RHAB W/ECG: CPT

## 2023-04-26 ASSESSMENT — PATIENT HEALTH QUESTIONNAIRE - PHQ9
1. LITTLE INTEREST OR PLEASURE IN DOING THINGS: SEVERAL DAYS
6. FEELING BAD ABOUT YOURSELF - OR THAT YOU ARE A FAILURE OR HAVE LET YOURSELF OR YOUR FAMILY DOWN: NOT AT ALL
9. THOUGHTS THAT YOU WOULD BE BETTER OFF DEAD, OR OF HURTING YOURSELF: NOT AT ALL
5. POOR APPETITE OR OVEREATING: NOT AT ALL
7. TROUBLE CONCENTRATING ON THINGS, SUCH AS READING THE NEWSPAPER OR WATCHING TELEVISION: NOT AT ALL
3. TROUBLE FALLING OR STAYING ASLEEP OR SLEEPING TOO MUCH: NOT AT ALL
2. FEELING DOWN, DEPRESSED OR HOPELESS: NOT AT ALL
4. FEELING TIRED OR HAVING LITTLE ENERGY: NOT AT ALL
SUM OF ALL RESPONSES TO PHQ QUESTIONS 1-9: 1
8. MOVING OR SPEAKING SO SLOWLY THAT OTHER PEOPLE COULD HAVE NOTICED. OR THE OPPOSITE, BEING SO FIGETY OR RESTLESS THAT YOU HAVE BEEN MOVING AROUND A LOT MORE THAN USUAL: NOT AT ALL
10. IF YOU CHECKED OFF ANY PROBLEMS, HOW DIFFICULT HAVE THESE PROBLEMS MADE IT FOR YOU TO DO YOUR WORK, TAKE CARE OF THINGS AT HOME, OR GET ALONG WITH OTHER PEOPLE: NOT DIFFICULT AT ALL

## 2023-04-26 ASSESSMENT — EJECTION FRACTION: LVEF_VALUE: 45

## 2023-04-26 ASSESSMENT — 6 MINUTE WALK TEST (6MWT): DID PATIENT PARTICIPATE IN 6 MINUTE WALK TEST: YES

## 2023-05-01 ENCOUNTER — HOSPITAL ENCOUNTER (OUTPATIENT)
Dept: CARDIAC REHAB | Age: 83
Discharge: STILL ADMITTED OR ADMINISTRATIVE | End: 2023-05-01
Attending: INTERNAL MEDICINE

## 2023-05-01 PROCEDURE — 93798 PHYS/QHP OP CAR RHAB W/ECG: CPT

## 2023-05-03 ENCOUNTER — HOSPITAL ENCOUNTER (OUTPATIENT)
Dept: CARDIAC REHAB | Age: 83
Discharge: STILL ADMITTED OR ADMINISTRATIVE | End: 2023-05-03
Attending: INTERNAL MEDICINE

## 2023-05-03 PROCEDURE — 93798 PHYS/QHP OP CAR RHAB W/ECG: CPT

## 2023-05-04 ENCOUNTER — HOSPITAL ENCOUNTER (OUTPATIENT)
Dept: CARDIAC REHAB | Age: 83
Discharge: STILL ADMITTED OR ADMINISTRATIVE | End: 2023-05-04
Attending: INTERNAL MEDICINE

## 2023-05-04 PROCEDURE — 93798 PHYS/QHP OP CAR RHAB W/ECG: CPT

## 2023-05-08 ENCOUNTER — HOSPITAL ENCOUNTER (OUTPATIENT)
Dept: CARDIAC REHAB | Age: 83
Discharge: STILL ADMITTED OR ADMINISTRATIVE | End: 2023-05-08
Attending: INTERNAL MEDICINE

## 2023-05-08 PROCEDURE — 93798 PHYS/QHP OP CAR RHAB W/ECG: CPT

## 2023-05-10 ENCOUNTER — HOSPITAL ENCOUNTER (OUTPATIENT)
Dept: CARDIAC REHAB | Age: 83
Discharge: STILL ADMITTED OR ADMINISTRATIVE | End: 2023-05-10
Attending: INTERNAL MEDICINE

## 2023-05-10 PROCEDURE — 93798 PHYS/QHP OP CAR RHAB W/ECG: CPT

## 2023-05-11 ENCOUNTER — HOSPITAL ENCOUNTER (OUTPATIENT)
Dept: CARDIAC REHAB | Age: 83
Discharge: STILL ADMITTED OR ADMINISTRATIVE | End: 2023-05-11
Attending: INTERNAL MEDICINE

## 2023-05-11 PROCEDURE — 93798 PHYS/QHP OP CAR RHAB W/ECG: CPT

## 2023-05-15 ENCOUNTER — HOSPITAL ENCOUNTER (OUTPATIENT)
Dept: CARDIAC REHAB | Age: 83
Discharge: STILL ADMITTED OR ADMINISTRATIVE | End: 2023-05-15
Attending: INTERNAL MEDICINE

## 2023-05-15 PROCEDURE — 93798 PHYS/QHP OP CAR RHAB W/ECG: CPT

## 2023-05-17 ENCOUNTER — HOSPITAL ENCOUNTER (OUTPATIENT)
Dept: CARDIAC REHAB | Age: 83
Discharge: STILL ADMITTED OR ADMINISTRATIVE | End: 2023-05-17
Attending: INTERNAL MEDICINE

## 2023-05-17 PROCEDURE — 93798 PHYS/QHP OP CAR RHAB W/ECG: CPT

## 2023-05-18 ENCOUNTER — HOSPITAL ENCOUNTER (OUTPATIENT)
Dept: CARDIAC REHAB | Age: 83
Discharge: STILL ADMITTED OR ADMINISTRATIVE | End: 2023-05-18
Attending: INTERNAL MEDICINE

## 2023-05-18 PROCEDURE — 93798 PHYS/QHP OP CAR RHAB W/ECG: CPT

## 2023-05-22 ENCOUNTER — HOSPITAL ENCOUNTER (OUTPATIENT)
Dept: CARDIAC REHAB | Age: 83
Discharge: STILL ADMITTED OR ADMINISTRATIVE | End: 2023-05-22
Attending: INTERNAL MEDICINE

## 2023-05-22 PROCEDURE — 93798 PHYS/QHP OP CAR RHAB W/ECG: CPT

## 2023-05-24 ENCOUNTER — HOSPITAL ENCOUNTER (OUTPATIENT)
Dept: CARDIAC REHAB | Age: 83
Discharge: HOME | End: 2023-05-24
Attending: INTERNAL MEDICINE

## 2023-05-25 ENCOUNTER — TELEPHONE (OUTPATIENT)
Dept: CARDIAC REHAB | Age: 83
End: 2023-05-25

## 2023-05-25 ENCOUNTER — APPOINTMENT (OUTPATIENT)
Dept: CARDIAC REHAB | Age: 83
End: 2023-05-25
Attending: INTERNAL MEDICINE

## 2023-05-25 ASSESSMENT — PATIENT HEALTH QUESTIONNAIRE - PHQ9
4. FEELING TIRED OR HAVING LITTLE ENERGY: NOT AT ALL
SUM OF ALL RESPONSES TO PHQ QUESTIONS 1-9: 1
10. IF YOU CHECKED OFF ANY PROBLEMS, HOW DIFFICULT HAVE THESE PROBLEMS MADE IT FOR YOU TO DO YOUR WORK, TAKE CARE OF THINGS AT HOME, OR GET ALONG WITH OTHER PEOPLE: NOT DIFFICULT AT ALL
5. POOR APPETITE OR OVEREATING: NOT AT ALL
8. MOVING OR SPEAKING SO SLOWLY THAT OTHER PEOPLE COULD HAVE NOTICED. OR THE OPPOSITE, BEING SO FIGETY OR RESTLESS THAT YOU HAVE BEEN MOVING AROUND A LOT MORE THAN USUAL: NOT AT ALL
7. TROUBLE CONCENTRATING ON THINGS, SUCH AS READING THE NEWSPAPER OR WATCHING TELEVISION: NOT AT ALL
3. TROUBLE FALLING OR STAYING ASLEEP OR SLEEPING TOO MUCH: NOT AT ALL
1. LITTLE INTEREST OR PLEASURE IN DOING THINGS: SEVERAL DAYS
9. THOUGHTS THAT YOU WOULD BE BETTER OFF DEAD, OR OF HURTING YOURSELF: NOT AT ALL
2. FEELING DOWN, DEPRESSED OR HOPELESS: NOT AT ALL
6. FEELING BAD ABOUT YOURSELF - OR THAT YOU ARE A FAILURE OR HAVE LET YOURSELF OR YOUR FAMILY DOWN: NOT AT ALL

## 2023-05-25 ASSESSMENT — EJECTION FRACTION: LVEF_VALUE: 45

## 2023-05-25 ASSESSMENT — LIFESTYLE VARIABLES: ALCOHOL_INTAKE: OCC.

## 2023-05-29 ENCOUNTER — APPOINTMENT (OUTPATIENT)
Dept: CARDIAC REHAB | Age: 83
End: 2023-05-29
Attending: INTERNAL MEDICINE

## 2023-05-31 ENCOUNTER — APPOINTMENT (OUTPATIENT)
Dept: CT IMAGING | Age: 83
End: 2023-05-31
Attending: STUDENT IN AN ORGANIZED HEALTH CARE EDUCATION/TRAINING PROGRAM

## 2023-05-31 ENCOUNTER — TELEPHONE (OUTPATIENT)
Dept: CARDIAC REHAB | Age: 83
End: 2023-05-31

## 2023-05-31 ENCOUNTER — APPOINTMENT (OUTPATIENT)
Dept: ULTRASOUND IMAGING | Age: 83
End: 2023-05-31
Attending: HOSPITALIST

## 2023-05-31 ENCOUNTER — APPOINTMENT (OUTPATIENT)
Dept: CARDIAC REHAB | Age: 83
End: 2023-05-31
Attending: INTERNAL MEDICINE

## 2023-05-31 ENCOUNTER — HOSPITAL ENCOUNTER (OUTPATIENT)
Age: 83
Setting detail: OBSERVATION
Discharge: HOME | End: 2023-06-01
Attending: STUDENT IN AN ORGANIZED HEALTH CARE EDUCATION/TRAINING PROGRAM | Admitting: HOSPITALIST

## 2023-05-31 ENCOUNTER — APPOINTMENT (OUTPATIENT)
Dept: GENERAL RADIOLOGY | Age: 83
End: 2023-05-31
Attending: STUDENT IN AN ORGANIZED HEALTH CARE EDUCATION/TRAINING PROGRAM

## 2023-05-31 DIAGNOSIS — R07.9 CHEST PAIN, UNSPECIFIED TYPE: Primary | ICD-10-CM

## 2023-05-31 DIAGNOSIS — N17.9 ACUTE KIDNEY INJURY (CMD): ICD-10-CM

## 2023-05-31 LAB
ALBUMIN SERPL-MCNC: 3.8 G/DL (ref 3.6–5.1)
ALBUMIN/GLOB SERPL: 1.3 {RATIO} (ref 1–2.4)
ALP SERPL-CCNC: 72 UNITS/L (ref 45–117)
ALT SERPL-CCNC: 24 UNITS/L
ANION GAP SERPL CALC-SCNC: 7 MMOL/L (ref 7–19)
AST SERPL-CCNC: 16 UNITS/L
ATRIAL RATE (BPM): 49
ATRIAL RATE (BPM): 57
BASOPHILS # BLD: 0 K/MCL (ref 0–0.3)
BASOPHILS NFR BLD: 1 %
BILIRUB SERPL-MCNC: 0.5 MG/DL (ref 0.2–1)
BUN SERPL-MCNC: 19 MG/DL (ref 6–20)
BUN/CREAT SERPL: 11 (ref 7–25)
CALCIUM SERPL-MCNC: 9.5 MG/DL (ref 8.4–10.2)
CHLORIDE SERPL-SCNC: 110 MMOL/L (ref 97–110)
CK SERPL-CCNC: 50 UNITS/L (ref 39–308)
CO2 SERPL-SCNC: 27 MMOL/L (ref 21–32)
CREAT SERPL-MCNC: 1.7 MG/DL (ref 0.67–1.17)
DEPRECATED RDW RBC: 46 FL (ref 39–50)
EOSINOPHIL # BLD: 0.1 K/MCL (ref 0–0.5)
EOSINOPHIL NFR BLD: 4 %
ERYTHROCYTE [DISTWIDTH] IN BLOOD: 13.1 % (ref 11–15)
FASTING DURATION TIME PATIENT: ABNORMAL H
GFR SERPLBLD BASED ON 1.73 SQ M-ARVRAT: 40 ML/MIN
GLOBULIN SER-MCNC: 3 G/DL (ref 2–4)
GLUCOSE SERPL-MCNC: 105 MG/DL (ref 70–99)
HCT VFR BLD CALC: 35.7 % (ref 39–51)
HGB BLD-MCNC: 11.8 G/DL (ref 13–17)
IMM GRANULOCYTES # BLD AUTO: 0 K/MCL (ref 0–0.2)
IMM GRANULOCYTES # BLD: 1 %
LYMPHOCYTES # BLD: 0.9 K/MCL (ref 1–4)
LYMPHOCYTES NFR BLD: 24 %
MCH RBC QN AUTO: 31.6 PG (ref 26–34)
MCHC RBC AUTO-ENTMCNC: 33.1 G/DL (ref 32–36.5)
MCV RBC AUTO: 95.7 FL (ref 78–100)
MONOCYTES # BLD: 0.4 K/MCL (ref 0.3–0.9)
MONOCYTES NFR BLD: 11 %
NEUTROPHILS # BLD: 2.4 K/MCL (ref 1.8–7.7)
NEUTROPHILS NFR BLD: 59 %
NRBC BLD MANUAL-RTO: 0 /100 WBC
P AXIS (DEGREES): 101
P AXIS (DEGREES): 14
PLATELET # BLD AUTO: 168 K/MCL (ref 140–450)
POTASSIUM SERPL-SCNC: 4.4 MMOL/L (ref 3.4–5.1)
PR-INTERVAL (MSEC): 146
PR-INTERVAL (MSEC): 152
PROT SERPL-MCNC: 6.8 G/DL (ref 6.4–8.2)
QRS-INTERVAL (MSEC): 82
QRS-INTERVAL (MSEC): 98
QT-INTERVAL (MSEC): 446
QT-INTERVAL (MSEC): 494
QTC: 434
QTC: 446
R AXIS (DEGREES): 14
R AXIS (DEGREES): 9
RAINBOW EXTRA TUBES HOLD SPECIMEN: NORMAL
RAINBOW EXTRA TUBES HOLD SPECIMEN: NORMAL
RBC # BLD: 3.73 MIL/MCL (ref 4.5–5.9)
REPORT TEXT: NORMAL
REPORT TEXT: NORMAL
SODIUM SERPL-SCNC: 140 MMOL/L (ref 135–145)
T AXIS (DEGREES): 12
T AXIS (DEGREES): 24
TROPONIN I SERPL DL<=0.01 NG/ML-MCNC: 10 NG/L
TROPONIN I SERPL DL<=0.01 NG/ML-MCNC: 9 NG/L
VENTRICULAR RATE EKG/MIN (BPM): 49
VENTRICULAR RATE EKG/MIN (BPM): 57
WBC # BLD: 3.9 K/MCL (ref 4.2–11)

## 2023-05-31 PROCEDURE — 10002800 HB RX 250 W HCPCS: Performed by: HOSPITALIST

## 2023-05-31 PROCEDURE — 96372 THER/PROPH/DIAG INJ SC/IM: CPT | Performed by: HOSPITALIST

## 2023-05-31 PROCEDURE — 80053 COMPREHEN METABOLIC PANEL: CPT | Performed by: STUDENT IN AN ORGANIZED HEALTH CARE EDUCATION/TRAINING PROGRAM

## 2023-05-31 PROCEDURE — 36415 COLL VENOUS BLD VENIPUNCTURE: CPT

## 2023-05-31 PROCEDURE — 93970 EXTREMITY STUDY: CPT

## 2023-05-31 PROCEDURE — 82550 ASSAY OF CK (CPK): CPT | Performed by: STUDENT IN AN ORGANIZED HEALTH CARE EDUCATION/TRAINING PROGRAM

## 2023-05-31 PROCEDURE — 10002807 HB RX 258: Performed by: STUDENT IN AN ORGANIZED HEALTH CARE EDUCATION/TRAINING PROGRAM

## 2023-05-31 PROCEDURE — 10002803 HB RX 637: Performed by: INTERNAL MEDICINE

## 2023-05-31 PROCEDURE — 84484 ASSAY OF TROPONIN QUANT: CPT | Performed by: STUDENT IN AN ORGANIZED HEALTH CARE EDUCATION/TRAINING PROGRAM

## 2023-05-31 PROCEDURE — 10002803 HB RX 637: Performed by: STUDENT IN AN ORGANIZED HEALTH CARE EDUCATION/TRAINING PROGRAM

## 2023-05-31 PROCEDURE — G0378 HOSPITAL OBSERVATION PER HR: HCPCS

## 2023-05-31 PROCEDURE — 71045 X-RAY EXAM CHEST 1 VIEW: CPT

## 2023-05-31 PROCEDURE — 10004651 HB RX, NO CHARGE ITEM: Performed by: STUDENT IN AN ORGANIZED HEALTH CARE EDUCATION/TRAINING PROGRAM

## 2023-05-31 PROCEDURE — 85025 COMPLETE CBC W/AUTO DIFF WBC: CPT | Performed by: STUDENT IN AN ORGANIZED HEALTH CARE EDUCATION/TRAINING PROGRAM

## 2023-05-31 PROCEDURE — 10002803 HB RX 637: Performed by: HOSPITALIST

## 2023-05-31 PROCEDURE — 10004651 HB RX, NO CHARGE ITEM: Performed by: HOSPITALIST

## 2023-05-31 PROCEDURE — 99285 EMERGENCY DEPT VISIT HI MDM: CPT

## 2023-05-31 PROCEDURE — 93005 ELECTROCARDIOGRAM TRACING: CPT | Performed by: EMERGENCY MEDICINE

## 2023-05-31 PROCEDURE — G1004 CDSM NDSC: HCPCS

## 2023-05-31 PROCEDURE — 70450 CT HEAD/BRAIN W/O DYE: CPT

## 2023-05-31 PROCEDURE — 93005 ELECTROCARDIOGRAM TRACING: CPT | Performed by: STUDENT IN AN ORGANIZED HEALTH CARE EDUCATION/TRAINING PROGRAM

## 2023-05-31 RX ORDER — ASPIRIN 81 MG/1
324 TABLET, CHEWABLE ORAL ONCE
Status: DISCONTINUED | OUTPATIENT
Start: 2023-05-31 | End: 2023-05-31

## 2023-05-31 RX ORDER — HYDRALAZINE HYDROCHLORIDE 10 MG/1
10 TABLET, FILM COATED ORAL EVERY 8 HOURS PRN
Status: DISCONTINUED | OUTPATIENT
Start: 2023-05-31 | End: 2023-06-01 | Stop reason: HOSPADM

## 2023-05-31 RX ORDER — ASPIRIN 81 MG/1
81 TABLET ORAL DAILY
Status: DISCONTINUED | OUTPATIENT
Start: 2023-06-01 | End: 2023-06-01 | Stop reason: HOSPADM

## 2023-05-31 RX ORDER — 0.9 % SODIUM CHLORIDE 0.9 %
2 VIAL (ML) INJECTION EVERY 12 HOURS SCHEDULED
Status: DISCONTINUED | OUTPATIENT
Start: 2023-05-31 | End: 2023-06-01 | Stop reason: HOSPADM

## 2023-05-31 RX ORDER — METOPROLOL SUCCINATE 25 MG/1
25 TABLET, EXTENDED RELEASE ORAL DAILY
Status: DISCONTINUED | OUTPATIENT
Start: 2023-06-01 | End: 2023-06-01 | Stop reason: HOSPADM

## 2023-05-31 RX ORDER — ESCITALOPRAM OXALATE 5 MG/1
5 TABLET ORAL DAILY
Status: DISCONTINUED | OUTPATIENT
Start: 2023-06-01 | End: 2023-06-01 | Stop reason: HOSPADM

## 2023-05-31 RX ORDER — LOSARTAN POTASSIUM 50 MG/1
100 TABLET ORAL DAILY
Status: DISCONTINUED | OUTPATIENT
Start: 2023-06-01 | End: 2023-06-01 | Stop reason: HOSPADM

## 2023-05-31 RX ORDER — HYDRALAZINE HYDROCHLORIDE 50 MG/1
50 TABLET, FILM COATED ORAL EVERY 8 HOURS SCHEDULED
Status: DISCONTINUED | OUTPATIENT
Start: 2023-05-31 | End: 2023-06-01 | Stop reason: HOSPADM

## 2023-05-31 RX ORDER — CLOPIDOGREL BISULFATE 75 MG/1
75 TABLET ORAL DAILY
Status: DISCONTINUED | OUTPATIENT
Start: 2023-06-01 | End: 2023-06-01 | Stop reason: HOSPADM

## 2023-05-31 RX ORDER — BUTALBITAL, ACETAMINOPHEN AND CAFFEINE 50; 325; 40 MG/1; MG/1; MG/1
1 TABLET ORAL EVERY 4 HOURS PRN
Status: DISCONTINUED | OUTPATIENT
Start: 2023-05-31 | End: 2023-06-01 | Stop reason: HOSPADM

## 2023-05-31 RX ORDER — PANTOPRAZOLE SODIUM 40 MG/1
40 TABLET, DELAYED RELEASE ORAL
Status: DISCONTINUED | OUTPATIENT
Start: 2023-06-01 | End: 2023-06-01 | Stop reason: HOSPADM

## 2023-05-31 RX ORDER — EZETIMIBE 10 MG/1
10 TABLET ORAL DAILY
Status: DISCONTINUED | OUTPATIENT
Start: 2023-06-01 | End: 2023-06-01 | Stop reason: HOSPADM

## 2023-05-31 RX ORDER — ACETAMINOPHEN 500 MG
1000 TABLET ORAL ONCE
Status: COMPLETED | OUTPATIENT
Start: 2023-05-31 | End: 2023-05-31

## 2023-05-31 RX ORDER — ENOXAPARIN SODIUM 100 MG/ML
40 INJECTION SUBCUTANEOUS DAILY
Status: DISCONTINUED | OUTPATIENT
Start: 2023-05-31 | End: 2023-06-01 | Stop reason: HOSPADM

## 2023-05-31 RX ORDER — AMLODIPINE BESYLATE 10 MG/1
10 TABLET ORAL DAILY
Status: DISCONTINUED | OUTPATIENT
Start: 2023-06-01 | End: 2023-06-01 | Stop reason: HOSPADM

## 2023-05-31 RX ORDER — NITROGLYCERIN 0.4 MG/1
0.4 TABLET SUBLINGUAL ONCE
Status: COMPLETED | OUTPATIENT
Start: 2023-05-31 | End: 2023-05-31

## 2023-05-31 RX ADMIN — ACETAMINOPHEN 1000 MG: 500 TABLET ORAL at 11:07

## 2023-05-31 RX ADMIN — SODIUM CHLORIDE, PRESERVATIVE FREE 2 ML: 5 INJECTION INTRAVENOUS at 21:36

## 2023-05-31 RX ADMIN — NITROGLYCERIN 0.4 MG: 0.4 TABLET SUBLINGUAL at 11:09

## 2023-05-31 RX ADMIN — BUTALBITAL, ACETAMINOPHEN AND CAFFEINE 1 TABLET: 50; 325; 40 TABLET ORAL at 21:37

## 2023-05-31 RX ADMIN — HYDRALAZINE HYDROCHLORIDE 50 MG: 50 TABLET ORAL at 21:37

## 2023-05-31 RX ADMIN — HYDRALAZINE HYDROCHLORIDE 50 MG: 50 TABLET ORAL at 16:57

## 2023-05-31 RX ADMIN — SODIUM CHLORIDE, POTASSIUM CHLORIDE, SODIUM LACTATE AND CALCIUM CHLORIDE 500 ML: 600; 310; 30; 20 INJECTION, SOLUTION INTRAVENOUS at 11:09

## 2023-05-31 RX ADMIN — ENOXAPARIN SODIUM 40 MG: 40 INJECTION SUBCUTANEOUS at 18:38

## 2023-05-31 RX ADMIN — PANCRELIPASE 2 CAPSULE: 60000; 12000; 38000 CAPSULE, DELAYED RELEASE PELLETS ORAL at 18:38

## 2023-05-31 SDOH — HEALTH STABILITY: PHYSICAL HEALTH: DO YOU HAVE SERIOUS DIFFICULTY WALKING OR CLIMBING STAIRS?: NO

## 2023-05-31 SDOH — ECONOMIC STABILITY: FOOD INSECURITY: HOW OFTEN IN THE PAST 12 MONTHS WERE YOU WORRIED OR STRESSED ABOUT HAVING ENOUGH MONEY TO BUY NUTRITIOUS MEALS?: NEVER

## 2023-05-31 SDOH — ECONOMIC STABILITY: HOUSING INSECURITY: WHAT IS YOUR LIVING SITUATION TODAY?: HOUSE

## 2023-05-31 SDOH — ECONOMIC STABILITY: HOUSING INSECURITY: ARE YOU WORRIED ABOUT LOSING YOUR HOUSING?: NO

## 2023-05-31 SDOH — HEALTH STABILITY: PHYSICAL HEALTH: DO YOU HAVE DIFFICULTY DRESSING OR BATHING?: NO

## 2023-05-31 SDOH — SOCIAL STABILITY: SOCIAL NETWORK: SUPPORT SYSTEMS: CHILDREN

## 2023-05-31 SDOH — HEALTH STABILITY: GENERAL: BECAUSE OF A PHYSICAL, MENTAL, OR EMOTIONAL CONDITION, DO YOU HAVE DIFFICULTY DOING ERRANDS ALONE?: NO

## 2023-05-31 SDOH — ECONOMIC STABILITY: GENERAL

## 2023-05-31 SDOH — ECONOMIC STABILITY: HOUSING INSECURITY: WHAT IS YOUR LIVING SITUATION TODAY?: ALONE

## 2023-05-31 ASSESSMENT — PAIN SCALES - GENERAL
PAINLEVEL_OUTOF10: 1
PAINLEVEL_OUTOF10: 4
PAINLEVEL_OUTOF10: 4
PAINLEVEL_OUTOF10: 5
PAINLEVEL_OUTOF10: 4

## 2023-05-31 ASSESSMENT — HEART SCORE
RISK FACTORS: EQUAL OR GREATER  THAN 3 RISK FACTORS OR HISTORY OF ATHEROSCLEROTIC DISEASE
TROPONIN: EQUAL OR LESS THAN NORMAL LIMIT
HEART SCORE: 6
HISTORY: MODERATELY SUSPICIOUS
EKG: NON SPECIFIC REPOLARIZATION DISTURBANCE
AGE: EQUAL OR GREATER THAN 65

## 2023-05-31 ASSESSMENT — COLUMBIA-SUICIDE SEVERITY RATING SCALE - C-SSRS
IS THE PATIENT ABLE TO COMPLETE C-SSRS: YES
2. HAVE YOU ACTUALLY HAD ANY THOUGHTS OF KILLING YOURSELF?: NO
1. WITHIN THE PAST MONTH, HAVE YOU WISHED YOU WERE DEAD OR WISHED YOU COULD GO TO SLEEP AND NOT WAKE UP?: NO
6. HAVE YOU EVER DONE ANYTHING, STARTED TO DO ANYTHING, OR PREPARED TO DO ANYTHING TO END YOUR LIFE?: NO

## 2023-05-31 ASSESSMENT — LIFESTYLE VARIABLES
HOW OFTEN DO YOU HAVE 6 OR MORE DRINKS ON ONE OCCASION: NEVER
AUDIT-C TOTAL SCORE: 0
HOW OFTEN DO YOU HAVE A DRINK CONTAINING ALCOHOL: NEVER
ALCOHOL_USE_STATUS: NO OR LOW RISK WITH VALIDATED TOOL
HOW MANY STANDARD DRINKS CONTAINING ALCOHOL DO YOU HAVE ON A TYPICAL DAY: 0,1 OR 2

## 2023-05-31 ASSESSMENT — PATIENT HEALTH QUESTIONNAIRE - PHQ9
2. FEELING DOWN, DEPRESSED OR HOPELESS: NOT AT ALL
SUM OF ALL RESPONSES TO PHQ9 QUESTIONS 1 AND 2: 0
SUM OF ALL RESPONSES TO PHQ9 QUESTIONS 1 AND 2: 0
1. LITTLE INTEREST OR PLEASURE IN DOING THINGS: NOT AT ALL
CLINICAL INTERPRETATION OF PHQ2 SCORE: NO FURTHER SCREENING NEEDED
IS PATIENT ABLE TO COMPLETE PHQ2 OR PHQ9: YES

## 2023-05-31 ASSESSMENT — ACTIVITIES OF DAILY LIVING (ADL)
ADL_SCORE: 12
ADL_SHORT_OF_BREATH: NO
ADL_BEFORE_ADMISSION: INDEPENDENT
RECENT_DECLINE_ADL: NO

## 2023-06-01 ENCOUNTER — APPOINTMENT (OUTPATIENT)
Dept: CARDIAC REHAB | Age: 83
End: 2023-06-01
Attending: INTERNAL MEDICINE

## 2023-06-01 VITALS
RESPIRATION RATE: 14 BRPM | SYSTOLIC BLOOD PRESSURE: 119 MMHG | TEMPERATURE: 99 F | OXYGEN SATURATION: 97 % | WEIGHT: 173.28 LBS | HEART RATE: 53 BPM | BODY MASS INDEX: 23.47 KG/M2 | DIASTOLIC BLOOD PRESSURE: 55 MMHG | HEIGHT: 72 IN

## 2023-06-01 LAB
ANION GAP SERPL CALC-SCNC: 9 MMOL/L (ref 7–19)
BUN SERPL-MCNC: 17 MG/DL (ref 6–20)
BUN/CREAT SERPL: 11 (ref 7–25)
CALCIUM SERPL-MCNC: 9 MG/DL (ref 8.4–10.2)
CHLORIDE SERPL-SCNC: 111 MMOL/L (ref 97–110)
CO2 SERPL-SCNC: 26 MMOL/L (ref 21–32)
CREAT SERPL-MCNC: 1.54 MG/DL (ref 0.67–1.17)
FASTING DURATION TIME PATIENT: ABNORMAL H
GFR SERPLBLD BASED ON 1.73 SQ M-ARVRAT: 44 ML/MIN
GLUCOSE SERPL-MCNC: 94 MG/DL (ref 70–99)
POTASSIUM SERPL-SCNC: 4.3 MMOL/L (ref 3.4–5.1)
RAINBOW EXTRA TUBES HOLD SPECIMEN: NORMAL
SODIUM SERPL-SCNC: 142 MMOL/L (ref 135–145)
TROPONIN I SERPL DL<=0.01 NG/ML-MCNC: 14 NG/L

## 2023-06-01 PROCEDURE — 80048 BASIC METABOLIC PNL TOTAL CA: CPT | Performed by: HOSPITALIST

## 2023-06-01 PROCEDURE — 84484 ASSAY OF TROPONIN QUANT: CPT | Performed by: INTERNAL MEDICINE

## 2023-06-01 PROCEDURE — 36415 COLL VENOUS BLD VENIPUNCTURE: CPT | Performed by: HOSPITALIST

## 2023-06-01 PROCEDURE — 10002803 HB RX 637: Performed by: INTERNAL MEDICINE

## 2023-06-01 PROCEDURE — 10004651 HB RX, NO CHARGE ITEM: Performed by: INTERNAL MEDICINE

## 2023-06-01 PROCEDURE — 10002803 HB RX 637: Performed by: HOSPITALIST

## 2023-06-01 PROCEDURE — G0378 HOSPITAL OBSERVATION PER HR: HCPCS

## 2023-06-01 PROCEDURE — 10004651 HB RX, NO CHARGE ITEM: Performed by: HOSPITALIST

## 2023-06-01 RX ORDER — ESCITALOPRAM OXALATE 5 MG/1
5 TABLET ORAL DAILY
Status: SHIPPED | COMMUNITY
Start: 2023-06-01 | End: 2023-09-05

## 2023-06-01 RX ORDER — HYDRALAZINE HYDROCHLORIDE 50 MG/1
50 TABLET, FILM COATED ORAL EVERY 8 HOURS SCHEDULED
Qty: 90 TABLET | Refills: 0 | Status: SHIPPED | OUTPATIENT
Start: 2023-06-01

## 2023-06-01 RX ORDER — ACETAMINOPHEN 325 MG/1
650 TABLET ORAL EVERY 4 HOURS PRN
Status: DISCONTINUED | OUTPATIENT
Start: 2023-06-01 | End: 2023-06-01 | Stop reason: HOSPADM

## 2023-06-01 RX ADMIN — HYDRALAZINE HYDROCHLORIDE 10 MG: 10 TABLET, FILM COATED ORAL at 03:50

## 2023-06-01 RX ADMIN — ASPIRIN 81 MG: 81 TABLET, COATED ORAL at 09:01

## 2023-06-01 RX ADMIN — PANCRELIPASE 2 CAPSULE: 60000; 12000; 38000 CAPSULE, DELAYED RELEASE PELLETS ORAL at 11:58

## 2023-06-01 RX ADMIN — LOSARTAN POTASSIUM 100 MG: 50 TABLET, FILM COATED ORAL at 09:01

## 2023-06-01 RX ADMIN — EZETIMIBE 10 MG: 10 TABLET ORAL at 09:00

## 2023-06-01 RX ADMIN — PANCRELIPASE 2 CAPSULE: 60000; 12000; 38000 CAPSULE, DELAYED RELEASE PELLETS ORAL at 09:01

## 2023-06-01 RX ADMIN — METOPROLOL SUCCINATE 25 MG: 25 TABLET, EXTENDED RELEASE ORAL at 09:01

## 2023-06-01 RX ADMIN — ESCITALOPRAM OXALATE 5 MG: 5 TABLET, FILM COATED ORAL at 09:01

## 2023-06-01 RX ADMIN — PANTOPRAZOLE SODIUM 40 MG: 40 TABLET, DELAYED RELEASE ORAL at 05:45

## 2023-06-01 RX ADMIN — CLOPIDOGREL BISULFATE 75 MG: 75 TABLET, FILM COATED ORAL at 09:01

## 2023-06-01 RX ADMIN — AMLODIPINE BESYLATE 10 MG: 10 TABLET ORAL at 09:02

## 2023-06-01 RX ADMIN — SODIUM CHLORIDE, PRESERVATIVE FREE 2 ML: 5 INJECTION INTRAVENOUS at 09:05

## 2023-06-01 RX ADMIN — HYDRALAZINE HYDROCHLORIDE 50 MG: 50 TABLET ORAL at 05:45

## 2023-06-01 ASSESSMENT — COGNITIVE AND FUNCTIONAL STATUS - GENERAL
BECAUSE OF A PHYSICAL, MENTAL, OR EMOTIONAL CONDITION, DO YOU HAVE DIFFICULTY DOING ERRANDS ALONE: NO
DO YOU HAVE DIFFICULTY DRESSING OR BATHING: NO
BECAUSE OF A PHYSICAL, MENTAL, OR EMOTIONAL CONDITION, DO YOU HAVE SERIOUS DIFFICULTY CONCENTRATING, REMEMBERING OR MAKING DECISIONS: NO

## 2023-06-01 ASSESSMENT — PAIN SCALES - GENERAL: PAINLEVEL_OUTOF10: 1

## 2023-06-05 ENCOUNTER — APPOINTMENT (OUTPATIENT)
Dept: CARDIAC REHAB | Age: 83
End: 2023-06-05

## 2023-06-07 ENCOUNTER — HOSPITAL ENCOUNTER (OUTPATIENT)
Dept: CARDIAC REHAB | Age: 83
Discharge: STILL ADMITTED OR ADMINISTRATIVE | End: 2023-06-07
Attending: INTERNAL MEDICINE

## 2023-06-07 PROCEDURE — 93798 PHYS/QHP OP CAR RHAB W/ECG: CPT

## 2023-06-08 ENCOUNTER — APPOINTMENT (OUTPATIENT)
Dept: CARDIAC REHAB | Age: 83
End: 2023-06-08
Attending: INTERNAL MEDICINE

## 2023-06-12 ENCOUNTER — HOSPITAL ENCOUNTER (OUTPATIENT)
Dept: CARDIAC REHAB | Age: 83
Discharge: STILL ADMITTED OR ADMINISTRATIVE | End: 2023-06-12
Attending: INTERNAL MEDICINE

## 2023-06-12 PROCEDURE — 93798 PHYS/QHP OP CAR RHAB W/ECG: CPT

## 2023-06-14 ENCOUNTER — HOSPITAL ENCOUNTER (OUTPATIENT)
Dept: CARDIAC REHAB | Age: 83
Discharge: STILL ADMITTED OR ADMINISTRATIVE | End: 2023-06-14
Attending: INTERNAL MEDICINE

## 2023-06-14 PROCEDURE — 93798 PHYS/QHP OP CAR RHAB W/ECG: CPT

## 2023-06-15 ENCOUNTER — HOSPITAL ENCOUNTER (OUTPATIENT)
Dept: CARDIAC REHAB | Age: 83
Discharge: STILL ADMITTED OR ADMINISTRATIVE | End: 2023-06-15
Attending: INTERNAL MEDICINE

## 2023-06-15 PROCEDURE — 93798 PHYS/QHP OP CAR RHAB W/ECG: CPT

## 2023-06-19 ENCOUNTER — HOSPITAL ENCOUNTER (OUTPATIENT)
Dept: CARDIAC REHAB | Age: 83
Discharge: STILL ADMITTED OR ADMINISTRATIVE | End: 2023-06-19
Attending: INTERNAL MEDICINE

## 2023-06-19 PROCEDURE — 93798 PHYS/QHP OP CAR RHAB W/ECG: CPT

## 2023-06-21 ENCOUNTER — HOSPITAL ENCOUNTER (OUTPATIENT)
Dept: CARDIAC REHAB | Age: 83
Discharge: STILL ADMITTED OR ADMINISTRATIVE | End: 2023-06-21
Attending: INTERNAL MEDICINE

## 2023-06-21 PROCEDURE — 93798 PHYS/QHP OP CAR RHAB W/ECG: CPT

## 2023-06-22 ENCOUNTER — HOSPITAL ENCOUNTER (OUTPATIENT)
Dept: CARDIAC REHAB | Age: 83
Discharge: STILL ADMITTED OR ADMINISTRATIVE | End: 2023-06-22
Attending: INTERNAL MEDICINE

## 2023-06-22 PROCEDURE — 93798 PHYS/QHP OP CAR RHAB W/ECG: CPT

## 2023-06-22 ASSESSMENT — PATIENT HEALTH QUESTIONNAIRE - PHQ9
1. LITTLE INTEREST OR PLEASURE IN DOING THINGS: SEVERAL DAYS
7. TROUBLE CONCENTRATING ON THINGS, SUCH AS READING THE NEWSPAPER OR WATCHING TELEVISION: NOT AT ALL
5. POOR APPETITE OR OVEREATING: NOT AT ALL
2. FEELING DOWN, DEPRESSED OR HOPELESS: NOT AT ALL
4. FEELING TIRED OR HAVING LITTLE ENERGY: NOT AT ALL
10. IF YOU CHECKED OFF ANY PROBLEMS, HOW DIFFICULT HAVE THESE PROBLEMS MADE IT FOR YOU TO DO YOUR WORK, TAKE CARE OF THINGS AT HOME, OR GET ALONG WITH OTHER PEOPLE: NOT DIFFICULT AT ALL
3. TROUBLE FALLING OR STAYING ASLEEP OR SLEEPING TOO MUCH: NOT AT ALL
9. THOUGHTS THAT YOU WOULD BE BETTER OFF DEAD, OR OF HURTING YOURSELF: NOT AT ALL
SUM OF ALL RESPONSES TO PHQ QUESTIONS 1-9: 1
8. MOVING OR SPEAKING SO SLOWLY THAT OTHER PEOPLE COULD HAVE NOTICED. OR THE OPPOSITE, BEING SO FIGETY OR RESTLESS THAT YOU HAVE BEEN MOVING AROUND A LOT MORE THAN USUAL: NOT AT ALL
6. FEELING BAD ABOUT YOURSELF - OR THAT YOU ARE A FAILURE OR HAVE LET YOURSELF OR YOUR FAMILY DOWN: NOT AT ALL

## 2023-06-22 ASSESSMENT — EJECTION FRACTION: LVEF_VALUE: 45

## 2023-06-22 ASSESSMENT — LIFESTYLE VARIABLES: ALCOHOL_INTAKE: OCC.

## 2023-06-26 ENCOUNTER — HOSPITAL ENCOUNTER (OUTPATIENT)
Dept: CARDIAC REHAB | Age: 83
Discharge: STILL ADMITTED OR ADMINISTRATIVE | End: 2023-06-26
Attending: INTERNAL MEDICINE

## 2023-06-26 PROCEDURE — 93798 PHYS/QHP OP CAR RHAB W/ECG: CPT

## 2023-06-28 ENCOUNTER — HOSPITAL ENCOUNTER (OUTPATIENT)
Dept: CARDIAC REHAB | Age: 83
Discharge: STILL ADMITTED OR ADMINISTRATIVE | End: 2023-06-28
Attending: INTERNAL MEDICINE

## 2023-06-28 PROCEDURE — 93798 PHYS/QHP OP CAR RHAB W/ECG: CPT

## 2023-06-29 ENCOUNTER — HOSPITAL ENCOUNTER (OUTPATIENT)
Dept: CARDIAC REHAB | Age: 83
Discharge: STILL ADMITTED OR ADMINISTRATIVE | End: 2023-06-29

## 2023-06-29 ENCOUNTER — APPOINTMENT (OUTPATIENT)
Dept: CARDIAC REHAB | Age: 83
End: 2023-06-29
Attending: INTERNAL MEDICINE

## 2023-06-29 PROCEDURE — 93798 PHYS/QHP OP CAR RHAB W/ECG: CPT

## 2023-07-03 ENCOUNTER — HOSPITAL ENCOUNTER (OUTPATIENT)
Dept: CARDIAC REHAB | Age: 83
Discharge: STILL ADMITTED OR ADMINISTRATIVE | End: 2023-07-03
Attending: INTERNAL MEDICINE

## 2023-07-03 PROCEDURE — 93798 PHYS/QHP OP CAR RHAB W/ECG: CPT

## 2023-07-05 ENCOUNTER — HOSPITAL ENCOUNTER (OUTPATIENT)
Dept: CARDIAC REHAB | Age: 83
Discharge: STILL ADMITTED OR ADMINISTRATIVE | End: 2023-07-05

## 2023-07-05 PROCEDURE — 93798 PHYS/QHP OP CAR RHAB W/ECG: CPT

## 2023-07-06 ENCOUNTER — HOSPITAL ENCOUNTER (OUTPATIENT)
Dept: CARDIAC REHAB | Age: 83
Discharge: STILL ADMITTED OR ADMINISTRATIVE | End: 2023-07-06
Attending: INTERNAL MEDICINE

## 2023-07-06 PROCEDURE — 93798 PHYS/QHP OP CAR RHAB W/ECG: CPT

## 2023-07-10 ENCOUNTER — HOSPITAL ENCOUNTER (OUTPATIENT)
Dept: CARDIAC REHAB | Age: 83
Discharge: STILL ADMITTED OR ADMINISTRATIVE | End: 2023-07-10
Attending: INTERNAL MEDICINE

## 2023-07-10 PROCEDURE — 93798 PHYS/QHP OP CAR RHAB W/ECG: CPT

## 2023-07-12 ENCOUNTER — HOSPITAL ENCOUNTER (OUTPATIENT)
Dept: CARDIAC REHAB | Age: 83
Discharge: STILL ADMITTED OR ADMINISTRATIVE | End: 2023-07-12

## 2023-07-12 PROCEDURE — 93798 PHYS/QHP OP CAR RHAB W/ECG: CPT

## 2023-07-13 ENCOUNTER — HOSPITAL ENCOUNTER (OUTPATIENT)
Dept: CARDIAC REHAB | Age: 83
Discharge: STILL ADMITTED OR ADMINISTRATIVE | End: 2023-07-13
Attending: INTERNAL MEDICINE

## 2023-07-13 PROCEDURE — 93798 PHYS/QHP OP CAR RHAB W/ECG: CPT

## 2023-07-17 ENCOUNTER — HOSPITAL ENCOUNTER (OUTPATIENT)
Dept: CARDIAC REHAB | Age: 83
Discharge: STILL ADMITTED OR ADMINISTRATIVE | End: 2023-07-17
Attending: INTERNAL MEDICINE

## 2023-07-17 PROCEDURE — 93798 PHYS/QHP OP CAR RHAB W/ECG: CPT

## 2023-07-19 ENCOUNTER — HOSPITAL ENCOUNTER (OUTPATIENT)
Dept: CARDIAC REHAB | Age: 83
Discharge: STILL ADMITTED OR ADMINISTRATIVE | End: 2023-07-19
Attending: INTERNAL MEDICINE

## 2023-07-19 PROCEDURE — 93798 PHYS/QHP OP CAR RHAB W/ECG: CPT

## 2023-07-20 ENCOUNTER — HOSPITAL ENCOUNTER (OUTPATIENT)
Dept: CARDIAC REHAB | Age: 83
Discharge: STILL ADMITTED OR ADMINISTRATIVE | End: 2023-07-20

## 2023-07-20 VITALS — HEIGHT: 72 IN | BODY MASS INDEX: 23.5 KG/M2

## 2023-07-20 PROCEDURE — 93798 PHYS/QHP OP CAR RHAB W/ECG: CPT

## 2023-07-20 ASSESSMENT — PATIENT HEALTH QUESTIONNAIRE - PHQ9
4. FEELING TIRED OR HAVING LITTLE ENERGY: NOT AT ALL
1. LITTLE INTEREST OR PLEASURE IN DOING THINGS: SEVERAL DAYS
6. FEELING BAD ABOUT YOURSELF - OR THAT YOU ARE A FAILURE OR HAVE LET YOURSELF OR YOUR FAMILY DOWN: NOT AT ALL
2. FEELING DOWN, DEPRESSED OR HOPELESS: NOT AT ALL
9. THOUGHTS THAT YOU WOULD BE BETTER OFF DEAD, OR OF HURTING YOURSELF: NOT AT ALL
8. MOVING OR SPEAKING SO SLOWLY THAT OTHER PEOPLE COULD HAVE NOTICED. OR THE OPPOSITE, BEING SO FIGETY OR RESTLESS THAT YOU HAVE BEEN MOVING AROUND A LOT MORE THAN USUAL: NOT AT ALL
10. IF YOU CHECKED OFF ANY PROBLEMS, HOW DIFFICULT HAVE THESE PROBLEMS MADE IT FOR YOU TO DO YOUR WORK, TAKE CARE OF THINGS AT HOME, OR GET ALONG WITH OTHER PEOPLE: NOT DIFFICULT AT ALL
5. POOR APPETITE OR OVEREATING: NOT AT ALL
SUM OF ALL RESPONSES TO PHQ QUESTIONS 1-9: 1
3. TROUBLE FALLING OR STAYING ASLEEP OR SLEEPING TOO MUCH: NOT AT ALL
7. TROUBLE CONCENTRATING ON THINGS, SUCH AS READING THE NEWSPAPER OR WATCHING TELEVISION: NOT AT ALL

## 2023-07-20 ASSESSMENT — LIFESTYLE VARIABLES: ALCOHOL_INTAKE: OCC.

## 2023-07-20 ASSESSMENT — EJECTION FRACTION: LVEF_VALUE: 45

## 2023-07-24 ENCOUNTER — HOSPITAL ENCOUNTER (OUTPATIENT)
Dept: CARDIAC REHAB | Age: 83
Discharge: STILL ADMITTED OR ADMINISTRATIVE | End: 2023-07-24
Attending: INTERNAL MEDICINE

## 2023-07-24 PROCEDURE — 93798 PHYS/QHP OP CAR RHAB W/ECG: CPT

## 2023-07-26 ENCOUNTER — HOSPITAL ENCOUNTER (OUTPATIENT)
Dept: CARDIAC REHAB | Age: 83
Discharge: STILL ADMITTED OR ADMINISTRATIVE | End: 2023-07-26
Attending: INTERNAL MEDICINE

## 2023-07-26 PROCEDURE — 93798 PHYS/QHP OP CAR RHAB W/ECG: CPT

## 2023-07-27 ENCOUNTER — HOSPITAL ENCOUNTER (OUTPATIENT)
Dept: CARDIAC REHAB | Age: 83
Discharge: STILL ADMITTED OR ADMINISTRATIVE | End: 2023-07-27
Attending: INTERNAL MEDICINE

## 2023-07-27 PROCEDURE — 93798 PHYS/QHP OP CAR RHAB W/ECG: CPT

## 2023-07-31 ENCOUNTER — HOSPITAL ENCOUNTER (OUTPATIENT)
Dept: CARDIAC REHAB | Age: 83
Discharge: STILL ADMITTED OR ADMINISTRATIVE | End: 2023-07-31
Attending: INTERNAL MEDICINE

## 2023-07-31 PROCEDURE — 93798 PHYS/QHP OP CAR RHAB W/ECG: CPT

## 2023-08-02 ENCOUNTER — HOSPITAL ENCOUNTER (OUTPATIENT)
Dept: CARDIAC REHAB | Age: 83
Discharge: STILL ADMITTED OR ADMINISTRATIVE | End: 2023-08-02
Attending: INTERNAL MEDICINE

## 2023-08-02 PROCEDURE — 93798 PHYS/QHP OP CAR RHAB W/ECG: CPT

## 2023-08-03 ENCOUNTER — HOSPITAL ENCOUNTER (OUTPATIENT)
Dept: CARDIAC REHAB | Age: 83
Discharge: STILL ADMITTED OR ADMINISTRATIVE | End: 2023-08-03
Attending: INTERNAL MEDICINE

## 2023-08-03 VITALS — HEIGHT: 72 IN | BODY MASS INDEX: 23.5 KG/M2

## 2023-08-03 PROCEDURE — 93798 PHYS/QHP OP CAR RHAB W/ECG: CPT

## 2023-08-03 ASSESSMENT — PATIENT HEALTH QUESTIONNAIRE - PHQ9
SUM OF ALL RESPONSES TO PHQ QUESTIONS 1-9: 1
1. LITTLE INTEREST OR PLEASURE IN DOING THINGS: SEVERAL DAYS
5. POOR APPETITE OR OVEREATING: NOT AT ALL
10. IF YOU CHECKED OFF ANY PROBLEMS, HOW DIFFICULT HAVE THESE PROBLEMS MADE IT FOR YOU TO DO YOUR WORK, TAKE CARE OF THINGS AT HOME, OR GET ALONG WITH OTHER PEOPLE: NOT DIFFICULT AT ALL
2. FEELING DOWN, DEPRESSED OR HOPELESS: NOT AT ALL
3. TROUBLE FALLING OR STAYING ASLEEP OR SLEEPING TOO MUCH: NOT AT ALL
8. MOVING OR SPEAKING SO SLOWLY THAT OTHER PEOPLE COULD HAVE NOTICED. OR THE OPPOSITE, BEING SO FIGETY OR RESTLESS THAT YOU HAVE BEEN MOVING AROUND A LOT MORE THAN USUAL: NOT AT ALL
9. THOUGHTS THAT YOU WOULD BE BETTER OFF DEAD, OR OF HURTING YOURSELF: NOT AT ALL
7. TROUBLE CONCENTRATING ON THINGS, SUCH AS READING THE NEWSPAPER OR WATCHING TELEVISION: NOT AT ALL
4. FEELING TIRED OR HAVING LITTLE ENERGY: NOT AT ALL
6. FEELING BAD ABOUT YOURSELF - OR THAT YOU ARE A FAILURE OR HAVE LET YOURSELF OR YOUR FAMILY DOWN: NOT AT ALL

## 2023-08-03 ASSESSMENT — EJECTION FRACTION: LVEF_VALUE: 45

## 2023-08-03 ASSESSMENT — LIFESTYLE VARIABLES: ALCOHOL_INTAKE: OCC.

## 2023-09-05 ENCOUNTER — HOSPITAL ENCOUNTER (OUTPATIENT)
Age: 83
Setting detail: OBSERVATION
Discharge: HOME | End: 2023-09-06
Attending: EMERGENCY MEDICINE | Admitting: HOSPITALIST

## 2023-09-05 ENCOUNTER — APPOINTMENT (OUTPATIENT)
Dept: CT IMAGING | Age: 83
End: 2023-09-05
Attending: EMERGENCY MEDICINE

## 2023-09-05 ENCOUNTER — APPOINTMENT (OUTPATIENT)
Dept: GENERAL RADIOLOGY | Age: 83
End: 2023-09-05
Attending: EMERGENCY MEDICINE

## 2023-09-05 DIAGNOSIS — R07.9 CHEST PAIN, UNSPECIFIED TYPE: Primary | ICD-10-CM

## 2023-09-05 DIAGNOSIS — I10 HYPERTENSION, UNSPECIFIED TYPE: ICD-10-CM

## 2023-09-05 LAB
ALBUMIN SERPL-MCNC: 3.6 G/DL (ref 3.6–5.1)
ALBUMIN/GLOB SERPL: 1.1 {RATIO} (ref 1–2.4)
ALP SERPL-CCNC: 78 UNITS/L (ref 45–117)
ALT SERPL-CCNC: 18 UNITS/L
ANION GAP SERPL CALC-SCNC: 9 MMOL/L (ref 7–19)
AST SERPL-CCNC: 15 UNITS/L
BASOPHILS # BLD: 0 K/MCL (ref 0–0.3)
BASOPHILS NFR BLD: 0 %
BILIRUB SERPL-MCNC: 0.4 MG/DL (ref 0.2–1)
BUN SERPL-MCNC: 22 MG/DL (ref 6–20)
BUN/CREAT SERPL: 17 (ref 7–25)
CALCIUM SERPL-MCNC: 8.7 MG/DL (ref 8.4–10.2)
CHLORIDE SERPL-SCNC: 112 MMOL/L (ref 97–110)
CO2 SERPL-SCNC: 24 MMOL/L (ref 21–32)
CREAT SERPL-MCNC: 1.31 MG/DL (ref 0.67–1.17)
D DIMER PPP FEU-MCNC: 1.2 MG/L (FEU)
DEPRECATED RDW RBC: 48.2 FL (ref 39–50)
EGFRCR SERPLBLD CKD-EPI 2021: 54 ML/MIN/{1.73_M2}
EOSINOPHIL # BLD: 0.1 K/MCL (ref 0–0.5)
EOSINOPHIL NFR BLD: 3 %
ERYTHROCYTE [DISTWIDTH] IN BLOOD: 13.5 % (ref 11–15)
FASTING DURATION TIME PATIENT: ABNORMAL H
GLOBULIN SER-MCNC: 3.2 G/DL (ref 2–4)
GLUCOSE SERPL-MCNC: 111 MG/DL (ref 70–99)
HCT VFR BLD CALC: 35.7 % (ref 39–51)
HGB BLD-MCNC: 11.6 G/DL (ref 13–17)
IMM GRANULOCYTES # BLD AUTO: 0 K/MCL (ref 0–0.2)
IMM GRANULOCYTES # BLD: 0 %
LYMPHOCYTES # BLD: 0.8 K/MCL (ref 1–4)
LYMPHOCYTES NFR BLD: 21 %
MCH RBC QN AUTO: 31.8 PG (ref 26–34)
MCHC RBC AUTO-ENTMCNC: 32.5 G/DL (ref 32–36.5)
MCV RBC AUTO: 97.8 FL (ref 78–100)
MONOCYTES # BLD: 0.5 K/MCL (ref 0.3–0.9)
MONOCYTES NFR BLD: 14 %
NEUTROPHILS # BLD: 2.3 K/MCL (ref 1.8–7.7)
NEUTROPHILS NFR BLD: 62 %
NRBC BLD MANUAL-RTO: 0 /100 WBC
PLATELET # BLD AUTO: 174 K/MCL (ref 140–450)
POTASSIUM SERPL-SCNC: 3.6 MMOL/L (ref 3.4–5.1)
PROT SERPL-MCNC: 6.8 G/DL (ref 6.4–8.2)
RAINBOW EXTRA TUBES HOLD SPECIMEN: NORMAL
RBC # BLD: 3.65 MIL/MCL (ref 4.5–5.9)
SODIUM SERPL-SCNC: 141 MMOL/L (ref 135–145)
TROPONIN I SERPL DL<=0.01 NG/ML-MCNC: 12 NG/L
WBC # BLD: 3.7 K/MCL (ref 4.2–11)

## 2023-09-05 PROCEDURE — 85025 COMPLETE CBC W/AUTO DIFF WBC: CPT | Performed by: EMERGENCY MEDICINE

## 2023-09-05 PROCEDURE — 10002805 HB CONTRAST AGENT: Performed by: EMERGENCY MEDICINE

## 2023-09-05 PROCEDURE — 99284 EMERGENCY DEPT VISIT MOD MDM: CPT

## 2023-09-05 PROCEDURE — G0378 HOSPITAL OBSERVATION PER HR: HCPCS

## 2023-09-05 PROCEDURE — 85379 FIBRIN DEGRADATION QUANT: CPT | Performed by: EMERGENCY MEDICINE

## 2023-09-05 PROCEDURE — 10004651 HB RX, NO CHARGE ITEM: Performed by: HOSPITALIST

## 2023-09-05 PROCEDURE — G1004 CDSM NDSC: HCPCS

## 2023-09-05 PROCEDURE — 84484 ASSAY OF TROPONIN QUANT: CPT | Performed by: EMERGENCY MEDICINE

## 2023-09-05 PROCEDURE — 71045 X-RAY EXAM CHEST 1 VIEW: CPT

## 2023-09-05 PROCEDURE — 80053 COMPREHEN METABOLIC PANEL: CPT | Performed by: EMERGENCY MEDICINE

## 2023-09-05 PROCEDURE — 36415 COLL VENOUS BLD VENIPUNCTURE: CPT

## 2023-09-05 PROCEDURE — 93005 ELECTROCARDIOGRAM TRACING: CPT | Performed by: EMERGENCY MEDICINE

## 2023-09-05 PROCEDURE — 10004180 HB COUNTER-TRANSPORT

## 2023-09-05 PROCEDURE — 10002803 HB RX 637: Performed by: HOSPITALIST

## 2023-09-05 PROCEDURE — 71275 CT ANGIOGRAPHY CHEST: CPT

## 2023-09-05 PROCEDURE — 10002019 HB COUNTER RESP ASSESSMENT

## 2023-09-05 RX ORDER — 0.9 % SODIUM CHLORIDE 0.9 %
2 VIAL (ML) INJECTION EVERY 12 HOURS SCHEDULED
Status: DISCONTINUED | OUTPATIENT
Start: 2023-09-05 | End: 2023-09-06 | Stop reason: HOSPADM

## 2023-09-05 RX ORDER — ACETAMINOPHEN 325 MG/1
650 TABLET ORAL EVERY 4 HOURS PRN
Status: DISCONTINUED | OUTPATIENT
Start: 2023-09-05 | End: 2023-09-06 | Stop reason: HOSPADM

## 2023-09-05 RX ORDER — CLOPIDOGREL BISULFATE 75 MG/1
75 TABLET ORAL DAILY
Status: DISCONTINUED | OUTPATIENT
Start: 2023-09-05 | End: 2023-09-06 | Stop reason: HOSPADM

## 2023-09-05 RX ORDER — NIFEDIPINE 30 MG/1
30 TABLET, EXTENDED RELEASE ORAL DAILY
Status: DISCONTINUED | OUTPATIENT
Start: 2023-09-05 | End: 2023-09-06 | Stop reason: HOSPADM

## 2023-09-05 RX ORDER — HYDRALAZINE HYDROCHLORIDE 50 MG/1
50 TABLET, FILM COATED ORAL 3 TIMES DAILY
Status: DISCONTINUED | OUTPATIENT
Start: 2023-09-05 | End: 2023-09-05

## 2023-09-05 RX ORDER — NIFEDIPINE 30 MG/1
30 TABLET, EXTENDED RELEASE ORAL DAILY
Status: DISCONTINUED | OUTPATIENT
Start: 2023-09-05 | End: 2023-09-05

## 2023-09-05 RX ORDER — HYDRALAZINE HYDROCHLORIDE 50 MG/1
50 TABLET, FILM COATED ORAL 3 TIMES DAILY
Status: DISCONTINUED | OUTPATIENT
Start: 2023-09-05 | End: 2023-09-06 | Stop reason: HOSPADM

## 2023-09-05 RX ORDER — METOPROLOL SUCCINATE 25 MG/1
25 TABLET, EXTENDED RELEASE ORAL DAILY
Status: DISCONTINUED | OUTPATIENT
Start: 2023-09-05 | End: 2023-09-06 | Stop reason: HOSPADM

## 2023-09-05 RX ORDER — ASPIRIN 81 MG/1
81 TABLET, CHEWABLE ORAL DAILY
Status: DISCONTINUED | OUTPATIENT
Start: 2023-09-05 | End: 2023-09-06 | Stop reason: HOSPADM

## 2023-09-05 RX ORDER — HYDROCHLOROTHIAZIDE 12.5 MG/1
12.5 TABLET ORAL DAILY
Status: DISCONTINUED | OUTPATIENT
Start: 2023-09-05 | End: 2023-09-05

## 2023-09-05 RX ORDER — ALBUTEROL SULFATE 2.5 MG/3ML
2.5 SOLUTION RESPIRATORY (INHALATION)
Status: DISCONTINUED | OUTPATIENT
Start: 2023-09-05 | End: 2023-09-06 | Stop reason: HOSPADM

## 2023-09-05 RX ORDER — LOSARTAN POTASSIUM 50 MG/1
100 TABLET ORAL DAILY
Status: DISCONTINUED | OUTPATIENT
Start: 2023-09-05 | End: 2023-09-05

## 2023-09-05 RX ORDER — LOSARTAN POTASSIUM 50 MG/1
100 TABLET ORAL DAILY
Status: DISCONTINUED | OUTPATIENT
Start: 2023-09-05 | End: 2023-09-06 | Stop reason: HOSPADM

## 2023-09-05 RX ORDER — HYDROCHLOROTHIAZIDE 12.5 MG/1
12.5 TABLET ORAL DAILY
Status: DISCONTINUED | OUTPATIENT
Start: 2023-09-05 | End: 2023-09-06 | Stop reason: HOSPADM

## 2023-09-05 RX ORDER — METOPROLOL SUCCINATE 50 MG/1
25 TABLET, EXTENDED RELEASE ORAL DAILY
Status: DISCONTINUED | OUTPATIENT
Start: 2023-09-05 | End: 2023-09-05

## 2023-09-05 RX ORDER — CLOPIDOGREL BISULFATE 75 MG/1
75 TABLET ORAL DAILY
Status: DISCONTINUED | OUTPATIENT
Start: 2023-09-05 | End: 2023-09-05

## 2023-09-05 RX ORDER — ONDANSETRON 2 MG/ML
4 INJECTION INTRAMUSCULAR; INTRAVENOUS EVERY 6 HOURS PRN
Status: DISCONTINUED | OUTPATIENT
Start: 2023-09-05 | End: 2023-09-06 | Stop reason: HOSPADM

## 2023-09-05 RX ORDER — ASPIRIN 81 MG/1
81 TABLET, CHEWABLE ORAL DAILY
Status: DISCONTINUED | OUTPATIENT
Start: 2023-09-05 | End: 2023-09-05

## 2023-09-05 RX ORDER — POTASSIUM CHLORIDE 20 MEQ/1
40 TABLET, EXTENDED RELEASE ORAL ONCE
Status: COMPLETED | OUTPATIENT
Start: 2023-09-05 | End: 2023-09-05

## 2023-09-05 RX ORDER — ENOXAPARIN SODIUM 100 MG/ML
40 INJECTION SUBCUTANEOUS DAILY
Status: DISCONTINUED | OUTPATIENT
Start: 2023-09-06 | End: 2023-09-06 | Stop reason: HOSPADM

## 2023-09-05 RX ADMIN — HYDROCHLOROTHIAZIDE 12.5 MG: 12.5 TABLET ORAL at 17:37

## 2023-09-05 RX ADMIN — HYDRALAZINE HYDROCHLORIDE 50 MG: 50 TABLET ORAL at 17:29

## 2023-09-05 RX ADMIN — ACETAMINOPHEN 650 MG: 325 TABLET ORAL at 23:33

## 2023-09-05 RX ADMIN — SODIUM CHLORIDE, PRESERVATIVE FREE 2 ML: 5 INJECTION INTRAVENOUS at 23:28

## 2023-09-05 RX ADMIN — IOHEXOL 75 ML: 350 INJECTION, SOLUTION INTRAVENOUS at 16:11

## 2023-09-05 RX ADMIN — CLOPIDOGREL BISULFATE 75 MG: 75 TABLET, FILM COATED ORAL at 18:55

## 2023-09-05 RX ADMIN — NIFEDIPINE 30 MG: 30 TABLET, FILM COATED, EXTENDED RELEASE ORAL at 17:37

## 2023-09-05 RX ADMIN — POTASSIUM CHLORIDE 40 MEQ: 1500 TABLET, EXTENDED RELEASE ORAL at 23:28

## 2023-09-05 RX ADMIN — LOSARTAN POTASSIUM 100 MG: 50 TABLET, FILM COATED ORAL at 17:29

## 2023-09-05 RX ADMIN — HYDRALAZINE HYDROCHLORIDE 50 MG: 50 TABLET ORAL at 23:28

## 2023-09-05 RX ADMIN — METOPROLOL SUCCINATE 25 MG: 50 TABLET, EXTENDED RELEASE ORAL at 17:30

## 2023-09-05 RX ADMIN — ASPIRIN 81 MG CHEWABLE TABLET 81 MG: 81 TABLET CHEWABLE at 18:55

## 2023-09-05 SDOH — ECONOMIC STABILITY: GENERAL

## 2023-09-05 SDOH — ECONOMIC STABILITY: FOOD INSECURITY: HOW OFTEN IN THE PAST 12 MONTHS WERE YOU WORRIED OR STRESSED ABOUT HAVING ENOUGH MONEY TO BUY NUTRITIOUS MEALS?: NEVER

## 2023-09-05 SDOH — HEALTH STABILITY: PHYSICAL HEALTH: DO YOU HAVE DIFFICULTY DRESSING OR BATHING?: NO

## 2023-09-05 SDOH — ECONOMIC STABILITY: HOUSING INSECURITY: ARE YOU WORRIED ABOUT LOSING YOUR HOUSING?: NO

## 2023-09-05 SDOH — HEALTH STABILITY: PHYSICAL HEALTH: DO YOU HAVE SERIOUS DIFFICULTY WALKING OR CLIMBING STAIRS?: NO

## 2023-09-05 SDOH — SOCIAL STABILITY: SOCIAL NETWORK
HOW OFTEN DO YOU SEE OR TALK TO PEOPLE THAT YOU CARE ABOUT AND FEEL CLOSE TO? (FOR EXAMPLE: TALKING TO FRIENDS ON THE PHONE, VISITING FRIENDS OR FAMILY, GOING TO CHURCH OR CLUB MEETINGS): 5 OR MORE TIMES A WEEK

## 2023-09-05 SDOH — SOCIAL STABILITY: SOCIAL NETWORK: SUPPORT SYSTEMS: CHILDREN;FRIENDS

## 2023-09-05 SDOH — ECONOMIC STABILITY: HOUSING INSECURITY: WHAT IS YOUR LIVING SITUATION TODAY?: HOUSE

## 2023-09-05 SDOH — ECONOMIC STABILITY: HOUSING INSECURITY: WHAT IS YOUR LIVING SITUATION TODAY?: ADULT CHILDREN

## 2023-09-05 SDOH — HEALTH STABILITY: GENERAL: BECAUSE OF A PHYSICAL, MENTAL, OR EMOTIONAL CONDITION, DO YOU HAVE DIFFICULTY DOING ERRANDS ALONE?: NO

## 2023-09-05 ASSESSMENT — PAIN SCALES - GENERAL
PAINLEVEL_OUTOF10: 5
PAINLEVEL_OUTOF10: 3
PAINLEVEL_OUTOF10: 5
PAINLEVEL_OUTOF10: 0

## 2023-09-05 ASSESSMENT — HEART SCORE
TROPONIN: EQUAL OR LESS THAN NORMAL LIMIT
HEART SCORE: 5
RISK FACTORS: EQUAL OR GREATER  THAN 3 RISK FACTORS OR HISTORY OF ATHEROSCLEROTIC DISEASE
AGE: EQUAL OR GREATER THAN 65
EKG: NORMAL
HISTORY: MODERATELY SUSPICIOUS

## 2023-09-05 ASSESSMENT — LIFESTYLE VARIABLES
HOW MANY STANDARD DRINKS CONTAINING ALCOHOL DO YOU HAVE ON A TYPICAL DAY: 0,1 OR 2
HOW OFTEN DO YOU HAVE 6 OR MORE DRINKS ON ONE OCCASION: NEVER
AUDIT-C TOTAL SCORE: 2
ALCOHOL_USE_STATUS: NO OR LOW RISK WITH VALIDATED TOOL
HOW OFTEN DO YOU HAVE A DRINK CONTAINING ALCOHOL: 2 TO 4 TIMES PER MONTH

## 2023-09-05 ASSESSMENT — PATIENT HEALTH QUESTIONNAIRE - PHQ9
CLINICAL INTERPRETATION OF PHQ2 SCORE: NO FURTHER SCREENING NEEDED
2. FEELING DOWN, DEPRESSED OR HOPELESS: NOT AT ALL
SUM OF ALL RESPONSES TO PHQ9 QUESTIONS 1 AND 2: 0
1. LITTLE INTEREST OR PLEASURE IN DOING THINGS: NOT AT ALL
IS PATIENT ABLE TO COMPLETE PHQ2 OR PHQ9: YES
SUM OF ALL RESPONSES TO PHQ9 QUESTIONS 1 AND 2: 0

## 2023-09-05 ASSESSMENT — ORIENTATION MEMORY CONCENTRATION TEST (OMCT)
OMCT SCORE: 0
OMCT INTERPRETATION: 0-6: NO SIGNIFICANT IMPAIRMENT
WHAT MONTH IS IT NOW: CORRECT
WHAT YEAR IS IT NOW (MUST BE EXACT): CORRECT
COUNT BACKWARDS FROM 20 TO 1: CORRECT
SAY THE MONTHS IN REVERSE ORDER STARTING WITH LAST MONTH: CORRECT
WHAT TIME IS IT (NO WATCH OR CLOCK): CORRECT
REPEAT THE NAME AND ADDRESS I ASKED YOU TO REMEMBER: CORRECT

## 2023-09-05 ASSESSMENT — ENCOUNTER SYMPTOMS
SORE THROAT: 0
FATIGUE: 0
NAUSEA: 0
RHINORRHEA: 0
FEVER: 0
VOMITING: 0
SINUS PRESSURE: 0
SHORTNESS OF BREATH: 1
HALLUCINATIONS: 0
ADENOPATHY: 0
CHILLS: 0
NUMBNESS: 0
WHEEZING: 0
WEAKNESS: 0
HEADACHES: 0
PHOTOPHOBIA: 0
DIARRHEA: 0
ABDOMINAL PAIN: 0

## 2023-09-05 ASSESSMENT — COLUMBIA-SUICIDE SEVERITY RATING SCALE - C-SSRS
6. HAVE YOU EVER DONE ANYTHING, STARTED TO DO ANYTHING, OR PREPARED TO DO ANYTHING TO END YOUR LIFE?: NO
2. HAVE YOU ACTUALLY HAD ANY THOUGHTS OF KILLING YOURSELF?: NO
IS THE PATIENT ABLE TO COMPLETE C-SSRS: YES
1. WITHIN THE PAST MONTH, HAVE YOU WISHED YOU WERE DEAD OR WISHED YOU COULD GO TO SLEEP AND NOT WAKE UP?: NO

## 2023-09-05 ASSESSMENT — ACTIVITIES OF DAILY LIVING (ADL)
ADL_SCORE: 12
ADL_SHORT_OF_BREATH: NO
RECENT_DECLINE_ADL: NO
ADL_BEFORE_ADMISSION: INDEPENDENT

## 2023-09-05 ASSESSMENT — PULMONARY FUNCTION TESTS: FEV1/FVC: UNABLE TO OBTAIN, OR GREATER THAN 70%

## 2023-09-06 VITALS
RESPIRATION RATE: 14 BRPM | DIASTOLIC BLOOD PRESSURE: 60 MMHG | BODY MASS INDEX: 24.81 KG/M2 | HEIGHT: 72 IN | HEART RATE: 59 BPM | WEIGHT: 183.2 LBS | OXYGEN SATURATION: 95 % | SYSTOLIC BLOOD PRESSURE: 107 MMHG | TEMPERATURE: 97.9 F

## 2023-09-06 LAB
ANION GAP SERPL CALC-SCNC: 8 MMOL/L (ref 7–19)
ATRIAL RATE (BPM): 51
BUN SERPL-MCNC: 17 MG/DL (ref 6–20)
BUN/CREAT SERPL: 14 (ref 7–25)
CALCIUM SERPL-MCNC: 8.6 MG/DL (ref 8.4–10.2)
CHLORIDE SERPL-SCNC: 113 MMOL/L (ref 97–110)
CO2 SERPL-SCNC: 23 MMOL/L (ref 21–32)
CREAT SERPL-MCNC: 1.21 MG/DL (ref 0.67–1.17)
DEPRECATED RDW RBC: 46.8 FL (ref 39–50)
EGFRCR SERPLBLD CKD-EPI 2021: 59 ML/MIN/{1.73_M2}
ERYTHROCYTE [DISTWIDTH] IN BLOOD: 13.6 % (ref 11–15)
FASTING DURATION TIME PATIENT: ABNORMAL H
GLUCOSE SERPL-MCNC: 85 MG/DL (ref 70–99)
HCT VFR BLD CALC: 32.6 % (ref 39–51)
HGB BLD-MCNC: 11 G/DL (ref 13–17)
MCH RBC QN AUTO: 32.1 PG (ref 26–34)
MCHC RBC AUTO-ENTMCNC: 33.7 G/DL (ref 32–36.5)
MCV RBC AUTO: 95 FL (ref 78–100)
NRBC BLD MANUAL-RTO: 0 /100 WBC
P AXIS (DEGREES): 25
PLATELET # BLD AUTO: 159 K/MCL (ref 140–450)
POTASSIUM SERPL-SCNC: 4 MMOL/L (ref 3.4–5.1)
PR-INTERVAL (MSEC): 170
QRS-INTERVAL (MSEC): 90
QT-INTERVAL (MSEC): 474
QTC: 437
R AXIS (DEGREES): 24
RBC # BLD: 3.43 MIL/MCL (ref 4.5–5.9)
REPORT TEXT: NORMAL
SODIUM SERPL-SCNC: 140 MMOL/L (ref 135–145)
T AXIS (DEGREES): 30
TROPONIN I SERPL DL<=0.01 NG/ML-MCNC: 17 NG/L
VENTRICULAR RATE EKG/MIN (BPM): 51
WBC # BLD: 3.5 K/MCL (ref 4.2–11)

## 2023-09-06 PROCEDURE — 85027 COMPLETE CBC AUTOMATED: CPT | Performed by: HOSPITALIST

## 2023-09-06 PROCEDURE — 10004651 HB RX, NO CHARGE ITEM: Performed by: HOSPITALIST

## 2023-09-06 PROCEDURE — 84484 ASSAY OF TROPONIN QUANT: CPT | Performed by: INTERNAL MEDICINE

## 2023-09-06 PROCEDURE — G0378 HOSPITAL OBSERVATION PER HR: HCPCS

## 2023-09-06 PROCEDURE — 10002803 HB RX 637: Performed by: HOSPITALIST

## 2023-09-06 PROCEDURE — 36415 COLL VENOUS BLD VENIPUNCTURE: CPT | Performed by: HOSPITALIST

## 2023-09-06 PROCEDURE — 80048 BASIC METABOLIC PNL TOTAL CA: CPT | Performed by: HOSPITALIST

## 2023-09-06 PROCEDURE — 93005 ELECTROCARDIOGRAM TRACING: CPT | Performed by: INTERNAL MEDICINE

## 2023-09-06 PROCEDURE — 96372 THER/PROPH/DIAG INJ SC/IM: CPT | Performed by: HOSPITALIST

## 2023-09-06 PROCEDURE — 10002800 HB RX 250 W HCPCS: Performed by: HOSPITALIST

## 2023-09-06 RX ORDER — NIFEDIPINE 30 MG/1
30 TABLET, EXTENDED RELEASE ORAL DAILY
Qty: 30 TABLET | Refills: 0 | Status: SHIPPED | OUTPATIENT
Start: 2023-09-07

## 2023-09-06 RX ORDER — LORAZEPAM 0.5 MG/1
0.5 TABLET ORAL EVERY 8 HOURS PRN
Status: DISCONTINUED | OUTPATIENT
Start: 2023-09-06 | End: 2023-09-06 | Stop reason: HOSPADM

## 2023-09-06 RX ORDER — HYDROCHLOROTHIAZIDE 12.5 MG/1
12.5 TABLET ORAL DAILY
Qty: 30 TABLET | Refills: 0 | Status: SHIPPED | OUTPATIENT
Start: 2023-09-07

## 2023-09-06 RX ORDER — EZETIMIBE 10 MG/1
10 TABLET ORAL DAILY
Status: DISCONTINUED | OUTPATIENT
Start: 2023-09-06 | End: 2023-09-06 | Stop reason: HOSPADM

## 2023-09-06 RX ORDER — LORAZEPAM 0.5 MG/1
0.5 TABLET ORAL EVERY 8 HOURS PRN
Qty: 10 TABLET | Refills: 0 | Status: SHIPPED | OUTPATIENT
Start: 2023-09-06

## 2023-09-06 RX ORDER — PANTOPRAZOLE SODIUM 40 MG/1
40 TABLET, DELAYED RELEASE ORAL
Status: DISCONTINUED | OUTPATIENT
Start: 2023-09-06 | End: 2023-09-06 | Stop reason: HOSPADM

## 2023-09-06 RX ADMIN — PANTOPRAZOLE SODIUM 40 MG: 40 TABLET, DELAYED RELEASE ORAL at 13:37

## 2023-09-06 RX ADMIN — HYDRALAZINE HYDROCHLORIDE 50 MG: 50 TABLET ORAL at 09:06

## 2023-09-06 RX ADMIN — PANCRELIPASE 2 CAPSULE: 60000; 12000; 38000 CAPSULE, DELAYED RELEASE PELLETS ORAL at 13:37

## 2023-09-06 RX ADMIN — ENOXAPARIN SODIUM 40 MG: 40 INJECTION SUBCUTANEOUS at 09:07

## 2023-09-06 RX ADMIN — SODIUM CHLORIDE, PRESERVATIVE FREE 2 ML: 5 INJECTION INTRAVENOUS at 09:07

## 2023-09-06 RX ADMIN — NIFEDIPINE 30 MG: 30 TABLET, FILM COATED, EXTENDED RELEASE ORAL at 09:05

## 2023-09-06 RX ADMIN — HYDRALAZINE HYDROCHLORIDE 50 MG: 50 TABLET ORAL at 13:37

## 2023-09-06 RX ADMIN — CLOPIDOGREL BISULFATE 75 MG: 75 TABLET, FILM COATED ORAL at 09:05

## 2023-09-06 RX ADMIN — LORAZEPAM 0.5 MG: 0.5 TABLET ORAL at 15:25

## 2023-09-06 RX ADMIN — EZETIMIBE 10 MG: 10 TABLET ORAL at 13:37

## 2023-09-06 RX ADMIN — LOSARTAN POTASSIUM 100 MG: 50 TABLET, FILM COATED ORAL at 09:05

## 2023-09-06 RX ADMIN — ASPIRIN 81 MG CHEWABLE TABLET 81 MG: 81 TABLET CHEWABLE at 09:05

## 2023-09-06 RX ADMIN — HYDROCHLOROTHIAZIDE 12.5 MG: 12.5 TABLET ORAL at 09:05

## 2023-09-06 RX ADMIN — METOPROLOL SUCCINATE 25 MG: 50 TABLET, EXTENDED RELEASE ORAL at 09:05

## 2023-09-06 ASSESSMENT — PAIN SCALES - GENERAL
PAINLEVEL_OUTOF10: 2
PAINLEVEL_OUTOF10: 2

## 2023-09-07 LAB
ATRIAL RATE (BPM): 54
P AXIS (DEGREES): 43
PR-INTERVAL (MSEC): 160
QRS-INTERVAL (MSEC): 84
QT-INTERVAL (MSEC): 486
QTC: 461
R AXIS (DEGREES): 43
REPORT TEXT: NORMAL
T AXIS (DEGREES): 40
VENTRICULAR RATE EKG/MIN (BPM): 54

## 2023-12-29 ENCOUNTER — HOSPITAL ENCOUNTER (OUTPATIENT)
Age: 83
Setting detail: OBSERVATION
Discharge: HOME | End: 2023-12-31
Attending: EMERGENCY MEDICINE | Admitting: HOSPITALIST

## 2023-12-29 ENCOUNTER — APPOINTMENT (OUTPATIENT)
Dept: GENERAL RADIOLOGY | Age: 83
End: 2023-12-29
Attending: EMERGENCY MEDICINE

## 2023-12-29 DIAGNOSIS — R07.9 CHEST PAIN, UNSPECIFIED TYPE: Primary | ICD-10-CM

## 2023-12-29 LAB
ALBUMIN SERPL-MCNC: 4 G/DL (ref 3.6–5.1)
ALBUMIN/GLOB SERPL: 1.2 {RATIO} (ref 1–2.4)
ALP SERPL-CCNC: 107 UNITS/L (ref 45–117)
ALT SERPL-CCNC: 23 UNITS/L
ANION GAP SERPL CALC-SCNC: 12 MMOL/L (ref 7–19)
AST SERPL-CCNC: 14 UNITS/L
BASOPHILS # BLD: 0 K/MCL (ref 0–0.3)
BASOPHILS NFR BLD: 1 %
BILIRUB SERPL-MCNC: 0.3 MG/DL (ref 0.2–1)
BUN SERPL-MCNC: 30 MG/DL (ref 6–20)
BUN/CREAT SERPL: 21 (ref 7–25)
CALCIUM SERPL-MCNC: 9.5 MG/DL (ref 8.4–10.2)
CHLORIDE SERPL-SCNC: 109 MMOL/L (ref 97–110)
CO2 SERPL-SCNC: 25 MMOL/L (ref 21–32)
CREAT SERPL-MCNC: 1.45 MG/DL (ref 0.67–1.17)
DEPRECATED RDW RBC: 43.6 FL (ref 39–50)
EGFRCR SERPLBLD CKD-EPI 2021: 48 ML/MIN/{1.73_M2}
EOSINOPHIL # BLD: 0.2 K/MCL (ref 0–0.5)
EOSINOPHIL NFR BLD: 4 %
ERYTHROCYTE [DISTWIDTH] IN BLOOD: 12.6 % (ref 11–15)
FASTING DURATION TIME PATIENT: ABNORMAL H
GLOBULIN SER-MCNC: 3.3 G/DL (ref 2–4)
GLUCOSE SERPL-MCNC: 106 MG/DL (ref 70–99)
HCT VFR BLD CALC: 37.8 % (ref 39–51)
HGB BLD-MCNC: 12.6 G/DL (ref 13–17)
IMM GRANULOCYTES # BLD AUTO: 0 K/MCL (ref 0–0.2)
IMM GRANULOCYTES # BLD: 1 %
LIPASE SERPL-CCNC: 40 UNITS/L (ref 15–77)
LYMPHOCYTES # BLD: 1.1 K/MCL (ref 1–4)
LYMPHOCYTES NFR BLD: 18 %
MAGNESIUM SERPL-MCNC: 2.4 MG/DL (ref 1.7–2.4)
MCH RBC QN AUTO: 31 PG (ref 26–34)
MCHC RBC AUTO-ENTMCNC: 33.3 G/DL (ref 32–36.5)
MCV RBC AUTO: 93.1 FL (ref 78–100)
MONOCYTES # BLD: 0.4 K/MCL (ref 0.3–0.9)
MONOCYTES NFR BLD: 8 %
NEUTROPHILS # BLD: 4 K/MCL (ref 1.8–7.7)
NEUTROPHILS NFR BLD: 68 %
NRBC BLD MANUAL-RTO: 0 /100 WBC
PLATELET # BLD AUTO: 189 K/MCL (ref 140–450)
POTASSIUM SERPL-SCNC: 4.6 MMOL/L (ref 3.4–5.1)
PROT SERPL-MCNC: 7.3 G/DL (ref 6.4–8.2)
RBC # BLD: 4.06 MIL/MCL (ref 4.5–5.9)
SODIUM SERPL-SCNC: 141 MMOL/L (ref 135–145)
TROPONIN I SERPL DL<=0.01 NG/ML-MCNC: 15 NG/L
WBC # BLD: 5.8 K/MCL (ref 4.2–11)

## 2023-12-29 PROCEDURE — 93005 ELECTROCARDIOGRAM TRACING: CPT | Performed by: EMERGENCY MEDICINE

## 2023-12-29 PROCEDURE — 85025 COMPLETE CBC W/AUTO DIFF WBC: CPT | Performed by: EMERGENCY MEDICINE

## 2023-12-29 PROCEDURE — 71045 X-RAY EXAM CHEST 1 VIEW: CPT

## 2023-12-29 PROCEDURE — 10002807 HB RX 258: Performed by: EMERGENCY MEDICINE

## 2023-12-29 PROCEDURE — 84484 ASSAY OF TROPONIN QUANT: CPT | Performed by: EMERGENCY MEDICINE

## 2023-12-29 PROCEDURE — 83690 ASSAY OF LIPASE: CPT | Performed by: EMERGENCY MEDICINE

## 2023-12-29 PROCEDURE — 80053 COMPREHEN METABOLIC PANEL: CPT | Performed by: EMERGENCY MEDICINE

## 2023-12-29 PROCEDURE — 83735 ASSAY OF MAGNESIUM: CPT | Performed by: EMERGENCY MEDICINE

## 2023-12-29 RX ORDER — NITROGLYCERIN 20 MG/100ML
0-200 INJECTION INTRAVENOUS CONTINUOUS
Status: DISCONTINUED | OUTPATIENT
Start: 2023-12-29 | End: 2023-12-29

## 2023-12-29 RX ORDER — NITROGLYCERIN 20 MG/100ML
40 INJECTION INTRAVENOUS CONTINUOUS
Status: DISCONTINUED | OUTPATIENT
Start: 2023-12-30 | End: 2023-12-30 | Stop reason: SDUPTHER

## 2023-12-29 RX ADMIN — NITROGLYCERIN 10 MCG/MIN: 20 INJECTION INTRAVENOUS at 21:22

## 2023-12-29 ASSESSMENT — PAIN SCALES - GENERAL: PAINLEVEL_OUTOF10: 5

## 2023-12-30 LAB
ALBUMIN SERPL-MCNC: 3.4 G/DL (ref 3.6–5.1)
ALBUMIN/GLOB SERPL: 1.3 {RATIO} (ref 1–2.4)
ALP SERPL-CCNC: 89 UNITS/L (ref 45–117)
ALT SERPL-CCNC: 19 UNITS/L
ANION GAP SERPL CALC-SCNC: 10 MMOL/L (ref 7–19)
AST SERPL-CCNC: 11 UNITS/L
ATRIAL RATE (BPM): 56
BASOPHILS # BLD: 0 K/MCL (ref 0–0.3)
BASOPHILS NFR BLD: 0 %
BILIRUB SERPL-MCNC: 0.4 MG/DL (ref 0.2–1)
BUN SERPL-MCNC: 30 MG/DL (ref 6–20)
BUN/CREAT SERPL: 19 (ref 7–25)
CALCIUM SERPL-MCNC: 8.9 MG/DL (ref 8.4–10.2)
CHLORIDE SERPL-SCNC: 111 MMOL/L (ref 97–110)
CO2 SERPL-SCNC: 24 MMOL/L (ref 21–32)
CREAT SERPL-MCNC: 1.55 MG/DL (ref 0.67–1.17)
DEPRECATED RDW RBC: 43.8 FL (ref 39–50)
EGFRCR SERPLBLD CKD-EPI 2021: 44 ML/MIN/{1.73_M2}
EOSINOPHIL # BLD: 0.3 K/MCL (ref 0–0.5)
EOSINOPHIL NFR BLD: 6 %
ERYTHROCYTE [DISTWIDTH] IN BLOOD: 12.7 % (ref 11–15)
FASTING DURATION TIME PATIENT: ABNORMAL H
GLOBULIN SER-MCNC: 2.6 G/DL (ref 2–4)
GLUCOSE SERPL-MCNC: 93 MG/DL (ref 70–99)
HCT VFR BLD CALC: 32.1 % (ref 39–51)
HGB BLD-MCNC: 10.7 G/DL (ref 13–17)
IMM GRANULOCYTES # BLD AUTO: 0 K/MCL (ref 0–0.2)
IMM GRANULOCYTES # BLD: 0 %
LYMPHOCYTES # BLD: 1.1 K/MCL (ref 1–4)
LYMPHOCYTES NFR BLD: 26 %
MAGNESIUM SERPL-MCNC: 2.3 MG/DL (ref 1.7–2.4)
MCH RBC QN AUTO: 31.4 PG (ref 26–34)
MCHC RBC AUTO-ENTMCNC: 33.3 G/DL (ref 32–36.5)
MCV RBC AUTO: 94.1 FL (ref 78–100)
MONOCYTES # BLD: 0.5 K/MCL (ref 0.3–0.9)
MONOCYTES NFR BLD: 12 %
NEUTROPHILS # BLD: 2.4 K/MCL (ref 1.8–7.7)
NEUTROPHILS NFR BLD: 56 %
NRBC BLD MANUAL-RTO: 0 /100 WBC
P AXIS (DEGREES): 39
PLATELET # BLD AUTO: 152 K/MCL (ref 140–450)
POTASSIUM SERPL-SCNC: 4.3 MMOL/L (ref 3.4–5.1)
PR-INTERVAL (MSEC): 162
PROT SERPL-MCNC: 6 G/DL (ref 6.4–8.2)
QRS-INTERVAL (MSEC): 92
QT-INTERVAL (MSEC): 450
QTC: 434
R AXIS (DEGREES): 40
RAINBOW EXTRA TUBES HOLD SPECIMEN: NORMAL
RAINBOW EXTRA TUBES HOLD SPECIMEN: NORMAL
RBC # BLD: 3.41 MIL/MCL (ref 4.5–5.9)
REPORT TEXT: NORMAL
SODIUM SERPL-SCNC: 141 MMOL/L (ref 135–145)
T AXIS (DEGREES): 25
TROPONIN I SERPL DL<=0.01 NG/ML-MCNC: 13 NG/L
VENTRICULAR RATE EKG/MIN (BPM): 56
WBC # BLD: 4.3 K/MCL (ref 4.2–11)

## 2023-12-30 PROCEDURE — 97530 THERAPEUTIC ACTIVITIES: CPT

## 2023-12-30 PROCEDURE — 80053 COMPREHEN METABOLIC PANEL: CPT | Performed by: HOSPITALIST

## 2023-12-30 PROCEDURE — 96372 THER/PROPH/DIAG INJ SC/IM: CPT | Performed by: HOSPITALIST

## 2023-12-30 PROCEDURE — 10002807 HB RX 258: Performed by: EMERGENCY MEDICINE

## 2023-12-30 PROCEDURE — 97165 OT EVAL LOW COMPLEX 30 MIN: CPT

## 2023-12-30 PROCEDURE — 10002800 HB RX 250 W HCPCS: Performed by: EMERGENCY MEDICINE

## 2023-12-30 PROCEDURE — 10002803 HB RX 637: Performed by: HOSPITALIST

## 2023-12-30 PROCEDURE — 85025 COMPLETE CBC W/AUTO DIFF WBC: CPT | Performed by: HOSPITALIST

## 2023-12-30 PROCEDURE — G0378 HOSPITAL OBSERVATION PER HR: HCPCS

## 2023-12-30 PROCEDURE — 83735 ASSAY OF MAGNESIUM: CPT | Performed by: HOSPITALIST

## 2023-12-30 PROCEDURE — 10002807 HB RX 258: Performed by: INTERNAL MEDICINE

## 2023-12-30 PROCEDURE — 10002803 HB RX 637: Performed by: INTERNAL MEDICINE

## 2023-12-30 PROCEDURE — 93005 ELECTROCARDIOGRAM TRACING: CPT | Performed by: INTERNAL MEDICINE

## 2023-12-30 PROCEDURE — 10004651 HB RX, NO CHARGE ITEM: Performed by: HOSPITALIST

## 2023-12-30 PROCEDURE — 36415 COLL VENOUS BLD VENIPUNCTURE: CPT | Performed by: HOSPITALIST

## 2023-12-30 PROCEDURE — 84484 ASSAY OF TROPONIN QUANT: CPT | Performed by: HOSPITALIST

## 2023-12-30 PROCEDURE — 10002800 HB RX 250 W HCPCS: Performed by: HOSPITALIST

## 2023-12-30 RX ORDER — 0.9 % SODIUM CHLORIDE 0.9 %
2 VIAL (ML) INJECTION EVERY 12 HOURS SCHEDULED
Status: DISCONTINUED | OUTPATIENT
Start: 2023-12-30 | End: 2023-12-31 | Stop reason: HOSPADM

## 2023-12-30 RX ORDER — ASPIRIN 81 MG/1
81 TABLET ORAL DAILY
Status: DISCONTINUED | OUTPATIENT
Start: 2023-12-30 | End: 2023-12-31 | Stop reason: HOSPADM

## 2023-12-30 RX ORDER — HYDRALAZINE HYDROCHLORIDE 25 MG/1
25 TABLET, FILM COATED ORAL DAILY
Status: ON HOLD | COMMUNITY
End: 2023-12-31 | Stop reason: HOSPADM

## 2023-12-30 RX ORDER — METOPROLOL SUCCINATE 25 MG/1
25 TABLET, EXTENDED RELEASE ORAL DAILY
Status: DISCONTINUED | OUTPATIENT
Start: 2023-12-30 | End: 2023-12-31 | Stop reason: HOSPADM

## 2023-12-30 RX ORDER — ONDANSETRON 4 MG/1
4 TABLET, ORALLY DISINTEGRATING ORAL EVERY 12 HOURS PRN
Status: DISCONTINUED | OUTPATIENT
Start: 2023-12-30 | End: 2023-12-31 | Stop reason: HOSPADM

## 2023-12-30 RX ORDER — ONDANSETRON 2 MG/ML
4 INJECTION INTRAMUSCULAR; INTRAVENOUS EVERY 12 HOURS PRN
Status: DISCONTINUED | OUTPATIENT
Start: 2023-12-30 | End: 2023-12-31 | Stop reason: HOSPADM

## 2023-12-30 RX ORDER — LORAZEPAM 0.5 MG/1
0.5 TABLET ORAL EVERY 8 HOURS PRN
Status: DISCONTINUED | OUTPATIENT
Start: 2023-12-30 | End: 2023-12-31 | Stop reason: HOSPADM

## 2023-12-30 RX ORDER — NITROGLYCERIN 20 MG/100ML
0-200 INJECTION INTRAVENOUS CONTINUOUS
Status: DISCONTINUED | OUTPATIENT
Start: 2023-12-30 | End: 2023-12-30 | Stop reason: SDUPTHER

## 2023-12-30 RX ORDER — PANTOPRAZOLE SODIUM 40 MG/1
40 TABLET, DELAYED RELEASE ORAL
Status: DISCONTINUED | OUTPATIENT
Start: 2023-12-30 | End: 2023-12-31 | Stop reason: HOSPADM

## 2023-12-30 RX ORDER — AMOXICILLIN 250 MG
2 CAPSULE ORAL 2 TIMES DAILY PRN
Status: DISCONTINUED | OUTPATIENT
Start: 2023-12-30 | End: 2023-12-31 | Stop reason: HOSPADM

## 2023-12-30 RX ORDER — ACETAMINOPHEN 650 MG/1
650 SUPPOSITORY RECTAL EVERY 4 HOURS PRN
Status: DISCONTINUED | OUTPATIENT
Start: 2023-12-30 | End: 2023-12-31 | Stop reason: HOSPADM

## 2023-12-30 RX ORDER — EZETIMIBE 10 MG/1
10 TABLET ORAL DAILY
Status: DISCONTINUED | OUTPATIENT
Start: 2023-12-30 | End: 2023-12-31 | Stop reason: HOSPADM

## 2023-12-30 RX ORDER — HYDROCHLOROTHIAZIDE 12.5 MG/1
12.5 TABLET ORAL DAILY
Status: DISCONTINUED | OUTPATIENT
Start: 2023-12-30 | End: 2023-12-31 | Stop reason: HOSPADM

## 2023-12-30 RX ORDER — HYDRALAZINE HYDROCHLORIDE 50 MG/1
50 TABLET, FILM COATED ORAL EVERY 8 HOURS SCHEDULED
Status: DISCONTINUED | OUTPATIENT
Start: 2023-12-30 | End: 2023-12-30

## 2023-12-30 RX ORDER — MAGNESIUM GLUCONATE 27 MG(500)
500 TABLET ORAL DAILY
COMMUNITY

## 2023-12-30 RX ORDER — ASCORBIC ACID 500 MG
500 TABLET ORAL DAILY
COMMUNITY

## 2023-12-30 RX ORDER — HYDRALAZINE HYDROCHLORIDE 50 MG/1
50 TABLET, FILM COATED ORAL PRN
Status: ON HOLD | COMMUNITY
End: 2023-12-31 | Stop reason: HOSPADM

## 2023-12-30 RX ORDER — HYDRALAZINE HYDROCHLORIDE 25 MG/1
25 TABLET, FILM COATED ORAL
Status: DISCONTINUED | OUTPATIENT
Start: 2023-12-30 | End: 2023-12-30

## 2023-12-30 RX ORDER — ROSUVASTATIN CALCIUM 10 MG/1
10 TABLET, COATED ORAL
Status: DISCONTINUED | OUTPATIENT
Start: 2023-12-30 | End: 2023-12-30 | Stop reason: DRUGHIGH

## 2023-12-30 RX ORDER — NITROGLYCERIN 20 MG/100ML
20 INJECTION INTRAVENOUS CONTINUOUS
Status: DISCONTINUED | OUTPATIENT
Start: 2023-12-30 | End: 2023-12-30

## 2023-12-30 RX ORDER — CLOPIDOGREL BISULFATE 75 MG/1
75 TABLET ORAL DAILY
Status: DISCONTINUED | OUTPATIENT
Start: 2023-12-30 | End: 2023-12-31 | Stop reason: HOSPADM

## 2023-12-30 RX ORDER — POLYETHYLENE GLYCOL 3350 17 G/17G
17 POWDER, FOR SOLUTION ORAL DAILY PRN
Status: DISCONTINUED | OUTPATIENT
Start: 2023-12-30 | End: 2023-12-31 | Stop reason: HOSPADM

## 2023-12-30 RX ORDER — ACETAMINOPHEN 325 MG/1
650 TABLET ORAL EVERY 4 HOURS PRN
Status: DISCONTINUED | OUTPATIENT
Start: 2023-12-30 | End: 2023-12-31 | Stop reason: HOSPADM

## 2023-12-30 RX ORDER — HYDRALAZINE HYDROCHLORIDE 25 MG/1
25 TABLET, FILM COATED ORAL 3 TIMES DAILY
Status: DISCONTINUED | OUTPATIENT
Start: 2023-12-30 | End: 2023-12-31 | Stop reason: HOSPADM

## 2023-12-30 RX ORDER — LOSARTAN POTASSIUM 50 MG/1
100 TABLET ORAL DAILY
Status: DISCONTINUED | OUTPATIENT
Start: 2023-12-30 | End: 2023-12-31 | Stop reason: HOSPADM

## 2023-12-30 RX ORDER — LANOLIN ALCOHOL/MO/W.PET/CERES
3 CREAM (GRAM) TOPICAL NIGHTLY PRN
Status: DISCONTINUED | OUTPATIENT
Start: 2023-12-30 | End: 2023-12-31 | Stop reason: HOSPADM

## 2023-12-30 RX ORDER — NIFEDIPINE 30 MG/1
30 TABLET, EXTENDED RELEASE ORAL DAILY
Status: DISCONTINUED | OUTPATIENT
Start: 2023-12-30 | End: 2023-12-31 | Stop reason: HOSPADM

## 2023-12-30 RX ORDER — HEPARIN SODIUM 5000 [USP'U]/ML
5000 INJECTION, SOLUTION INTRAVENOUS; SUBCUTANEOUS EVERY 8 HOURS SCHEDULED
Status: DISCONTINUED | OUTPATIENT
Start: 2023-12-30 | End: 2023-12-31 | Stop reason: HOSPADM

## 2023-12-30 RX ORDER — ACETAMINOPHEN 325 MG/1
650 TABLET ORAL EVERY 4 HOURS PRN
Status: DISCONTINUED | OUTPATIENT
Start: 2023-12-30 | End: 2023-12-30 | Stop reason: SDUPTHER

## 2023-12-30 RX ORDER — BISACODYL 10 MG
10 SUPPOSITORY, RECTAL RECTAL DAILY PRN
Status: DISCONTINUED | OUTPATIENT
Start: 2023-12-30 | End: 2023-12-31 | Stop reason: HOSPADM

## 2023-12-30 RX ADMIN — ACETAMINOPHEN 650 MG: 325 TABLET ORAL at 16:38

## 2023-12-30 RX ADMIN — CLOPIDOGREL BISULFATE 75 MG: 75 TABLET, FILM COATED ORAL at 09:08

## 2023-12-30 RX ADMIN — LORAZEPAM 0.5 MG: 0.5 TABLET ORAL at 17:36

## 2023-12-30 RX ADMIN — NITROGLYCERIN 40 MCG/MIN: 20 INJECTION INTRAVENOUS at 00:05

## 2023-12-30 RX ADMIN — HEPARIN SODIUM 5000 UNITS: 5000 INJECTION INTRAVENOUS; SUBCUTANEOUS at 06:34

## 2023-12-30 RX ADMIN — ACETAMINOPHEN 650 MG: 325 TABLET ORAL at 06:35

## 2023-12-30 RX ADMIN — NIFEDIPINE 30 MG: 30 TABLET, FILM COATED, EXTENDED RELEASE ORAL at 09:08

## 2023-12-30 RX ADMIN — HEPARIN SODIUM 5000 UNITS: 5000 INJECTION INTRAVENOUS; SUBCUTANEOUS at 14:34

## 2023-12-30 RX ADMIN — METOPROLOL SUCCINATE 25 MG: 25 TABLET, EXTENDED RELEASE ORAL at 09:08

## 2023-12-30 RX ADMIN — SODIUM CHLORIDE, PRESERVATIVE FREE 2 ML: 5 INJECTION INTRAVENOUS at 02:45

## 2023-12-30 RX ADMIN — LOSARTAN POTASSIUM 100 MG: 50 TABLET, FILM COATED ORAL at 09:08

## 2023-12-30 RX ADMIN — MORPHINE SULFATE 2 MG: 2 INJECTION, SOLUTION INTRAMUSCULAR; INTRAVENOUS at 00:12

## 2023-12-30 RX ADMIN — HYDRALAZINE HYDROCHLORIDE 25 MG: 25 TABLET, FILM COATED ORAL at 12:19

## 2023-12-30 RX ADMIN — SODIUM CHLORIDE, PRESERVATIVE FREE 2 ML: 5 INJECTION INTRAVENOUS at 20:47

## 2023-12-30 RX ADMIN — ASPIRIN 81 MG: 81 TABLET, COATED ORAL at 09:08

## 2023-12-30 RX ADMIN — NITROGLYCERIN 20 MCG/MIN: 20 INJECTION INTRAVENOUS at 02:44

## 2023-12-30 RX ADMIN — EZETIMIBE 10 MG: 10 TABLET ORAL at 09:08

## 2023-12-30 RX ADMIN — HYDROCHLOROTHIAZIDE 12.5 MG: 12.5 TABLET ORAL at 16:38

## 2023-12-30 RX ADMIN — PANTOPRAZOLE SODIUM 40 MG: 40 TABLET, DELAYED RELEASE ORAL at 16:38

## 2023-12-30 RX ADMIN — SODIUM CHLORIDE, PRESERVATIVE FREE 2 ML: 5 INJECTION INTRAVENOUS at 09:14

## 2023-12-30 RX ADMIN — ACETAMINOPHEN 650 MG: 325 TABLET ORAL at 02:48

## 2023-12-30 RX ADMIN — PANTOPRAZOLE SODIUM 40 MG: 40 TABLET, DELAYED RELEASE ORAL at 06:35

## 2023-12-30 RX ADMIN — HEPARIN SODIUM 5000 UNITS: 5000 INJECTION INTRAVENOUS; SUBCUTANEOUS at 22:15

## 2023-12-30 SDOH — ECONOMIC STABILITY: FOOD INSECURITY: WITHIN THE PAST 12 MONTHS, THE FOOD YOU BOUGHT JUST DIDN'T LAST AND YOU DIDN'T HAVE MONEY TO GET MORE.: NEVER TRUE

## 2023-12-30 SDOH — ECONOMIC STABILITY: HOUSING INSECURITY: WHAT IS YOUR LIVING SITUATION TODAY?: CHILDREN

## 2023-12-30 SDOH — ECONOMIC STABILITY: TRANSPORTATION INSECURITY
IN THE PAST 12 MONTHS, HAS LACK OF RELIABLE TRANSPORTATION KEPT YOU FROM MEDICAL APPOINTMENTS, MEETINGS, WORK OR FROM GETTING THINGS NEEDED FOR DAILY LIVING?: NO

## 2023-12-30 SDOH — SOCIAL STABILITY: SOCIAL NETWORK

## 2023-12-30 SDOH — ECONOMIC STABILITY: HOUSING INSECURITY: DO YOU HAVE PROBLEMS WITH ANY OF THE FOLLOWING?: NONE OF THE ABOVE

## 2023-12-30 SDOH — ECONOMIC STABILITY: GENERAL: WOULD YOU LIKE HELP WITH ANY OF THE FOLLOWING NEEDS?: I DON'T WANT HELP WITH ANY OF THESE

## 2023-12-30 SDOH — HEALTH STABILITY: GENERAL: BECAUSE OF A PHYSICAL, MENTAL, OR EMOTIONAL CONDITION, DO YOU HAVE DIFFICULTY DOING ERRANDS ALONE?: NO

## 2023-12-30 SDOH — ECONOMIC STABILITY: GENERAL

## 2023-12-30 SDOH — HEALTH STABILITY: PHYSICAL HEALTH: DO YOU HAVE SERIOUS DIFFICULTY WALKING OR CLIMBING STAIRS?: NO

## 2023-12-30 SDOH — ECONOMIC STABILITY: HOUSING INSECURITY: WHAT IS YOUR LIVING SITUATION TODAY?: HOUSE

## 2023-12-30 SDOH — ECONOMIC STABILITY: INCOME INSECURITY: IN THE PAST 12 MONTHS, HAS THE ELECTRIC, GAS, OIL, OR WATER COMPANY THREATENED TO SHUT OFF SERVICE IN YOUR HOME?: NO

## 2023-12-30 SDOH — ECONOMIC STABILITY: HOUSING INSECURITY: WHAT IS YOUR LIVING SITUATION TODAY?: I HAVE A STEADY PLACE TO LIVE

## 2023-12-30 SDOH — SOCIAL STABILITY: SOCIAL INSECURITY: HOW OFTEN DOES ANYONE, INCLUDING FAMILY AND FRIENDS, THREATEN YOU WITH HARM?: NEVER

## 2023-12-30 SDOH — HEALTH STABILITY: PHYSICAL HEALTH: DO YOU HAVE DIFFICULTY DRESSING OR BATHING?: NO

## 2023-12-30 SDOH — SOCIAL STABILITY: SOCIAL INSECURITY: HOW OFTEN DOES ANYONE, INCLUDING FAMILY AND FRIENDS, SCREAM OR CURSE AT YOU?: NEVER

## 2023-12-30 SDOH — SOCIAL STABILITY: SOCIAL INSECURITY: HOW OFTEN DOES ANYONE, INCLUDING FAMILY AND FRIENDS, PHYSICALLY HURT YOU?: NEVER

## 2023-12-30 SDOH — SOCIAL STABILITY: SOCIAL INSECURITY: HOW OFTEN DOES ANYONE, INCLUDING FAMILY AND FRIENDS, INSULT OR TALK DOWN TO YOU?: NEVER

## 2023-12-30 ASSESSMENT — ACTIVITIES OF DAILY LIVING (ADL)
PRIOR_ADL_BATHING: INDEPENDENT
ADL_SHORT_OF_BREATH: NO
DRESSING: INDEPENDENT
FEEDING: INDEPENDENT
PRIOR_ADL: INDEPENDENT
PRIOR_ADL_TOILETING: INDEPENDENT
TOILETING: INDEPENDENT
ADL_BEFORE_ADMISSION: INDEPENDENT
ADL_SCORE: 24
GROOMING: INDEPENDENT
BATHING: INDEPENDENT
EATING: INDEPENDENT
RECENT_DECLINE_ADL: NO

## 2023-12-30 ASSESSMENT — ENCOUNTER SYMPTOMS
DIARRHEA: 0
NAUSEA: 0
SHORTNESS OF BREATH: 0
VOMITING: 0
CHILLS: 0
DIZZINESS: 0
FEVER: 0
ABDOMINAL PAIN: 0
LIGHT-HEADEDNESS: 0

## 2023-12-30 ASSESSMENT — ORIENTATION MEMORY CONCENTRATION TEST (OMCT)
SAY THE MONTHS IN REVERSE ORDER STARTING WITH LAST MONTH: CORRECT
COUNT BACKWARDS FROM 20 TO 1: CORRECT
WHAT YEAR IS IT NOW (MUST BE EXACT): CORRECT
OMCT INTERPRETATION: 0-6: NO SIGNIFICANT IMPAIRMENT
WHAT TIME IS IT (NO WATCH OR CLOCK): CORRECT
WHAT MONTH IS IT NOW: CORRECT
REPEAT THE NAME AND ADDRESS I ASKED YOU TO REMEMBER: CORRECT
OMCT SCORE: 0

## 2023-12-30 ASSESSMENT — PATIENT HEALTH QUESTIONNAIRE - PHQ9
1. LITTLE INTEREST OR PLEASURE IN DOING THINGS: NOT AT ALL
IS PATIENT ABLE TO COMPLETE PHQ2 OR PHQ9: YES
2. FEELING DOWN, DEPRESSED OR HOPELESS: NOT AT ALL
SUM OF ALL RESPONSES TO PHQ9 QUESTIONS 1 AND 2: 0
CLINICAL INTERPRETATION OF PHQ2 SCORE: NO FURTHER SCREENING NEEDED
SUM OF ALL RESPONSES TO PHQ9 QUESTIONS 1 AND 2: 0

## 2023-12-30 ASSESSMENT — COGNITIVE AND FUNCTIONAL STATUS - GENERAL
DAILY_ACTIVITY_RAW_SCORE: 24
DAILY_ACTIVITY_CONVERTED_SCORE: 57.54

## 2023-12-30 ASSESSMENT — LIFESTYLE VARIABLES
HOW OFTEN DO YOU HAVE A DRINK CONTAINING ALCOHOL: MONTHLY OR LESS
HOW OFTEN DO YOU HAVE 6 OR MORE DRINKS ON ONE OCCASION: LESS THAN MONTHLY
ALCOHOL_USE_STATUS: NO OR LOW RISK WITH VALIDATED TOOL
AUDIT-C TOTAL SCORE: 2
HOW MANY STANDARD DRINKS CONTAINING ALCOHOL DO YOU HAVE ON A TYPICAL DAY: 0,1 OR 2

## 2023-12-30 ASSESSMENT — PAIN SCALES - GENERAL
PAINLEVEL_OUTOF10: 6
PAINLEVEL_OUTOF10: 0
PAINLEVEL_OUTOF10: 6
PAINLEVEL_OUTOF10: 5
PAINLEVEL_OUTOF10: 9
PAINLEVEL_OUTOF10: 5

## 2023-12-30 ASSESSMENT — COLUMBIA-SUICIDE SEVERITY RATING SCALE - C-SSRS
1. WITHIN THE PAST MONTH, HAVE YOU WISHED YOU WERE DEAD OR WISHED YOU COULD GO TO SLEEP AND NOT WAKE UP?: NO
2. HAVE YOU ACTUALLY HAD ANY THOUGHTS OF KILLING YOURSELF?: NO
6. HAVE YOU EVER DONE ANYTHING, STARTED TO DO ANYTHING, OR PREPARED TO DO ANYTHING TO END YOUR LIFE?: NO
IS THE PATIENT ABLE TO COMPLETE C-SSRS: YES

## 2023-12-30 ASSESSMENT — HEART SCORE
TROPONIN: EQUAL OR LESS THAN NORMAL LIMIT
AGE: EQUAL OR GREATER THAN 65
HISTORY: MODERATELY SUSPICIOUS
RISK FACTORS: EQUAL OR GREATER  THAN 3 RISK FACTORS OR HISTORY OF ATHEROSCLEROTIC DISEASE
EKG: NORMAL
HEART SCORE: 5

## 2023-12-30 ASSESSMENT — PAIN SCALES - WONG BAKER: WONGBAKER_NUMERICALRESPONSE: 0

## 2023-12-31 VITALS
HEIGHT: 72 IN | WEIGHT: 170.19 LBS | DIASTOLIC BLOOD PRESSURE: 56 MMHG | RESPIRATION RATE: 18 BRPM | OXYGEN SATURATION: 96 % | TEMPERATURE: 97.7 F | SYSTOLIC BLOOD PRESSURE: 97 MMHG | BODY MASS INDEX: 23.05 KG/M2 | HEART RATE: 57 BPM

## 2023-12-31 LAB
ATRIAL RATE (BPM): 52
P AXIS (DEGREES): 27
PR-INTERVAL (MSEC): 174
QRS-INTERVAL (MSEC): 88
QT-INTERVAL (MSEC): 462
QTC: 430
R AXIS (DEGREES): 35
RAINBOW EXTRA TUBES HOLD SPECIMEN: NORMAL
REPORT TEXT: NORMAL
T AXIS (DEGREES): 21
TROPONIN I SERPL DL<=0.01 NG/ML-MCNC: 10 NG/L
VENTRICULAR RATE EKG/MIN (BPM): 52

## 2023-12-31 PROCEDURE — 10002803 HB RX 637: Performed by: HOSPITALIST

## 2023-12-31 PROCEDURE — 10002800 HB RX 250 W HCPCS: Performed by: HOSPITALIST

## 2023-12-31 PROCEDURE — G0378 HOSPITAL OBSERVATION PER HR: HCPCS

## 2023-12-31 PROCEDURE — 96372 THER/PROPH/DIAG INJ SC/IM: CPT | Performed by: HOSPITALIST

## 2023-12-31 PROCEDURE — 36415 COLL VENOUS BLD VENIPUNCTURE: CPT | Performed by: INTERNAL MEDICINE

## 2023-12-31 PROCEDURE — 10004651 HB RX, NO CHARGE ITEM: Performed by: HOSPITALIST

## 2023-12-31 PROCEDURE — 10002803 HB RX 637: Performed by: INTERNAL MEDICINE

## 2023-12-31 PROCEDURE — 84484 ASSAY OF TROPONIN QUANT: CPT | Performed by: INTERNAL MEDICINE

## 2023-12-31 RX ORDER — HYDROCHLOROTHIAZIDE 12.5 MG/1
12.5 TABLET ORAL DAILY
Qty: 30 TABLET | Refills: 2 | Status: SHIPPED | OUTPATIENT
Start: 2024-01-01

## 2023-12-31 RX ORDER — LORAZEPAM 0.5 MG/1
0.5 TABLET ORAL EVERY 8 HOURS PRN
Qty: 10 TABLET | Refills: 0 | Status: SHIPPED | OUTPATIENT
Start: 2023-12-31

## 2023-12-31 RX ORDER — HYDRALAZINE HYDROCHLORIDE 25 MG/1
25 TABLET, FILM COATED ORAL 3 TIMES DAILY
Qty: 90 TABLET | Refills: 2 | Status: SHIPPED | OUTPATIENT
Start: 2023-12-31

## 2023-12-31 RX ORDER — LOSARTAN POTASSIUM 100 MG/1
50 TABLET ORAL DAILY
Qty: 30 TABLET | Refills: 2 | Status: SHIPPED
Start: 2023-12-31

## 2023-12-31 RX ADMIN — LOSARTAN POTASSIUM 100 MG: 50 TABLET, FILM COATED ORAL at 09:26

## 2023-12-31 RX ADMIN — CLOPIDOGREL BISULFATE 75 MG: 75 TABLET, FILM COATED ORAL at 09:26

## 2023-12-31 RX ADMIN — PANTOPRAZOLE SODIUM 40 MG: 40 TABLET, DELAYED RELEASE ORAL at 06:49

## 2023-12-31 RX ADMIN — NIFEDIPINE 30 MG: 30 TABLET, FILM COATED, EXTENDED RELEASE ORAL at 09:26

## 2023-12-31 RX ADMIN — SODIUM CHLORIDE, PRESERVATIVE FREE 2 ML: 5 INJECTION INTRAVENOUS at 09:00

## 2023-12-31 RX ADMIN — ASPIRIN 81 MG: 81 TABLET, COATED ORAL at 09:26

## 2023-12-31 RX ADMIN — HYDRALAZINE HYDROCHLORIDE 25 MG: 25 TABLET, FILM COATED ORAL at 09:27

## 2023-12-31 RX ADMIN — EZETIMIBE 10 MG: 10 TABLET ORAL at 09:26

## 2023-12-31 RX ADMIN — HEPARIN SODIUM 5000 UNITS: 5000 INJECTION INTRAVENOUS; SUBCUTANEOUS at 06:49

## 2023-12-31 RX ADMIN — HYDROCHLOROTHIAZIDE 12.5 MG: 12.5 TABLET ORAL at 09:26

## 2023-12-31 RX ADMIN — METOPROLOL SUCCINATE 25 MG: 25 TABLET, EXTENDED RELEASE ORAL at 09:26

## 2023-12-31 SDOH — ECONOMIC STABILITY: HOUSING INSECURITY: WHAT IS YOUR LIVING SITUATION TODAY?: I HAVE A STEADY PLACE TO LIVE

## 2023-12-31 ASSESSMENT — COGNITIVE AND FUNCTIONAL STATUS - GENERAL
BECAUSE OF A PHYSICAL, MENTAL, OR EMOTIONAL CONDITION, DO YOU HAVE SERIOUS DIFFICULTY CONCENTRATING, REMEMBERING OR MAKING DECISIONS: NO
DO YOU HAVE DIFFICULTY DRESSING OR BATHING: NO
BECAUSE OF A PHYSICAL, MENTAL, OR EMOTIONAL CONDITION, DO YOU HAVE DIFFICULTY DOING ERRANDS ALONE: NO

## 2023-12-31 ASSESSMENT — PAIN SCALES - GENERAL: PAINLEVEL_OUTOF10: 0

## 2024-01-22 ENCOUNTER — HOSPITAL ENCOUNTER (EMERGENCY)
Facility: HOSPITAL | Age: 84
Discharge: HOME | End: 2024-01-22
Attending: STUDENT IN AN ORGANIZED HEALTH CARE EDUCATION/TRAINING PROGRAM
Payer: MEDICARE

## 2024-01-22 VITALS
HEIGHT: 72 IN | SYSTOLIC BLOOD PRESSURE: 122 MMHG | DIASTOLIC BLOOD PRESSURE: 65 MMHG | RESPIRATION RATE: 14 BRPM | HEART RATE: 54 BPM | BODY MASS INDEX: 21.67 KG/M2 | WEIGHT: 160 LBS | TEMPERATURE: 98 F | OXYGEN SATURATION: 98 %

## 2024-01-22 DIAGNOSIS — Z79.899 ENCOUNTER FOR MEDICATION MANAGEMENT: Primary | ICD-10-CM

## 2024-01-22 LAB
ANION GAP SERPL CALC-SCNC: 8 MMOL/L (ref 0–18)
BASOPHILS # BLD AUTO: 0.01 X10(3) UL (ref 0–0.2)
BASOPHILS NFR BLD AUTO: 0.3 %
BUN BLD-MCNC: 27 MG/DL (ref 9–23)
BUN/CREAT SERPL: 15.9 (ref 10–20)
CALCIUM BLD-MCNC: 9.6 MG/DL (ref 8.7–10.4)
CHLORIDE SERPL-SCNC: 103 MMOL/L (ref 98–112)
CO2 SERPL-SCNC: 26 MMOL/L (ref 21–32)
CREAT BLD-MCNC: 1.7 MG/DL
DEPRECATED RDW RBC AUTO: 40.5 FL (ref 35.1–46.3)
EGFRCR SERPLBLD CKD-EPI 2021: 40 ML/MIN/1.73M2 (ref 60–?)
EOSINOPHIL # BLD AUTO: 0.14 X10(3) UL (ref 0–0.7)
EOSINOPHIL NFR BLD AUTO: 4 %
ERYTHROCYTE [DISTWIDTH] IN BLOOD BY AUTOMATED COUNT: 12 % (ref 11–15)
GLUCOSE BLD-MCNC: 142 MG/DL (ref 70–99)
HCT VFR BLD AUTO: 34.2 %
HGB BLD-MCNC: 11.7 G/DL
IMM GRANULOCYTES # BLD AUTO: 0.02 X10(3) UL (ref 0–1)
IMM GRANULOCYTES NFR BLD: 0.6 %
LYMPHOCYTES # BLD AUTO: 0.82 X10(3) UL (ref 1–4)
LYMPHOCYTES NFR BLD AUTO: 23.4 %
MCH RBC QN AUTO: 31.5 PG (ref 26–34)
MCHC RBC AUTO-ENTMCNC: 34.2 G/DL (ref 31–37)
MCV RBC AUTO: 91.9 FL
MONOCYTES # BLD AUTO: 0.35 X10(3) UL (ref 0.1–1)
MONOCYTES NFR BLD AUTO: 10 %
NEUTROPHILS # BLD AUTO: 2.16 X10 (3) UL (ref 1.5–7.7)
NEUTROPHILS # BLD AUTO: 2.16 X10(3) UL (ref 1.5–7.7)
NEUTROPHILS NFR BLD AUTO: 61.7 %
OSMOLALITY SERPL CALC.SUM OF ELEC: 292 MOSM/KG (ref 275–295)
PLATELET # BLD AUTO: 186 10(3)UL (ref 150–450)
POTASSIUM SERPL-SCNC: 4.4 MMOL/L (ref 3.5–5.1)
RBC # BLD AUTO: 3.72 X10(6)UL
SODIUM SERPL-SCNC: 137 MMOL/L (ref 136–145)
TROPONIN I SERPL HS-MCNC: 4 NG/L
WBC # BLD AUTO: 3.5 X10(3) UL (ref 4–11)

## 2024-01-22 PROCEDURE — 36415 COLL VENOUS BLD VENIPUNCTURE: CPT

## 2024-01-22 PROCEDURE — 85025 COMPLETE CBC W/AUTO DIFF WBC: CPT | Performed by: STUDENT IN AN ORGANIZED HEALTH CARE EDUCATION/TRAINING PROGRAM

## 2024-01-22 PROCEDURE — 99283 EMERGENCY DEPT VISIT LOW MDM: CPT

## 2024-01-22 PROCEDURE — 93005 ELECTROCARDIOGRAM TRACING: CPT

## 2024-01-22 PROCEDURE — 84484 ASSAY OF TROPONIN QUANT: CPT | Performed by: STUDENT IN AN ORGANIZED HEALTH CARE EDUCATION/TRAINING PROGRAM

## 2024-01-22 PROCEDURE — 80048 BASIC METABOLIC PNL TOTAL CA: CPT | Performed by: STUDENT IN AN ORGANIZED HEALTH CARE EDUCATION/TRAINING PROGRAM

## 2024-01-22 PROCEDURE — 93010 ELECTROCARDIOGRAM REPORT: CPT

## 2024-01-22 PROCEDURE — 99284 EMERGENCY DEPT VISIT MOD MDM: CPT

## 2024-01-22 NOTE — DISCHARGE INSTRUCTIONS
Please return to the emergency department if you develop severe and persistent chest pain,  difficulty breathing, dizziness, leg swelling or if you are coughing up blood as these can be signs  of a medical emergency. Please call your doctor for a follow up appointment in 2-3 days to  determine the need for further testing.

## 2024-01-22 NOTE — ED INITIAL ASSESSMENT (HPI)
Patient to ED from home for c/o hypotension and dizziness since this am. States he has had a lot of changes to BP medications over the last few months. Reports taking all meds this am prior to taking BP. BP WNL in triage.    States pt was seen in ED a few weeks ago for HTN.

## 2024-01-23 LAB
ATRIAL RATE: 63 BPM
P AXIS: 42 DEGREES
P-R INTERVAL: 152 MS
Q-T INTERVAL: 396 MS
QRS DURATION: 104 MS
QTC CALCULATION (BEZET): 405 MS
R AXIS: 52 DEGREES
T AXIS: -35 DEGREES
VENTRICULAR RATE: 63 BPM

## 2024-01-23 NOTE — ED PROVIDER NOTES
Patient Seen in: [facility] Emergency Department      History     Chief Complaint   Patient presents with    Hypotension    Dizziness     Stated Complaint: hypotensive, dizziness    Subjective:   HPI    83-year-old male with history of CAD, hypertension hyperlipidemia, CKD presenting for evaluation of low blood pressures.  He is accompanied by grandson.  There have been any recent changes to patient's antihypertensive regimen.  Most recently he was admitted at [facility] for chest pain, elevated blood pressures.  He was started on hydrochlorothiazide 12.5 mg daily, and hydralazine 25 mg 3 times daily.  He was seen subsequently by his cardiologist who changed his hydralazine to 25 mg 3 times daily as needed.  And then he was seen subsequently by his primary care doctor who recommended hydralazine once in the morning and then 2 additional times if needed in the afternoon and evening.  He has been making this change, but blood pressures have been on the lower side.  Took all of his blood pressure medications this morning and then felt lightheaded, and systolics were less than 90 when checked at home by family.  Presently feels improved.  He denies chest pain or difficulty breathing.    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    Inguinal hernia without mention of obstruction or gangrene, unilateral or unspecified, (not specified as recurrent) 7/15/2008    Mixed hyperlipidemia     Penicillin allergy 2/27/2012    S/P coronary artery stent placement 10/24/2011    Statin intolerance 9/30/2015    Unspecified visual disturbance 7/29/2008    Unspecified vitamin D deficiency 11/2/2011    Vision abnormalities 10/7/2010              Past  Surgical History:   Procedure Laterality Date    BYPASS SURGERY  1/28/2014    CABG x 5 LIMA-LAD, SVG- 2nd diagonal, 1st OM, Posterior descending & RCA    CABG      5 vessel bypass    COLONOSCOPY  2/3/12    small adenoma. Repeat 2017.    COLONOSCOPY N/A 4/20/2018    adenoma- repeat 5 yrs    OTHER SURGICAL HISTORY  11-29-11    flow/us Dr. Gibson    OTHER SURGICAL HISTORY  1-31-12    flow/us-Dr. Gibson    OTHER SURGICAL HISTORY  4/12    HERNIA SX @ [facility]    OTHER SURGICAL HISTORY  11/15/13    cysto-Dr. Gibson                Social History     Socioeconomic History    Marital status:    Tobacco Use    Smoking status: Never    Smokeless tobacco: Never   Substance and Sexual Activity    Alcohol use: No     Alcohol/week: 0.0 standard drinks of alcohol    Drug use: No              Review of Systems    Positive for stated complaint: hypotensive, dizziness  Other systems are as noted in HPI.  Constitutional and vital signs reviewed.      All other systems reviewed and negative except as noted above.    Physical Exam     ED Triage Vitals [01/22/24 1247]   /57   Pulse 65   Resp 16   Temp 98 °F (36.7 °C)   Temp src Temporal   SpO2 100 %   O2 Device None (Room air)       Current:/65   Pulse 54   Temp 98 °F (36.7 °C) (Temporal)   Resp 14   Ht 182.9 cm (6')   Wt 72.6 kg   SpO2 98%   BMI 21.70 kg/m²         Physical Exam    Constitutional: awake, alert, no sig distress  HENT: mmm, no lesions,  Neck: normal range of motion, no tenderness, supple.  Eyes: PERRL, EOMI, conjunctiva normal, no discharge. Sclera anicteric.  Cardiovascular: rr no murmur  Respiratory: Normal breath sounds, no respiratory distress, no wheezing, no chest tenderness.  GI: Bowel sounds normal, Soft, no tenderness, no masses, no pulsatile masses.  : No CVA tenderness.  Skin: Warm, dry, no erythema, no rash.  Musculoskeletal: Intact distal pulses, no edema, no tenderness, no cyanosis, no clubbing. Good range of motion in all major joints.  No tenderness to palpation or major deformities noted. Back- No tenderness.  Neurologic: Alert & oriented x 3, normal motor function, normal sensory function, no focal deficits noted.  Psych: Calm, cooperative, nl affect        ED Course     Labs Reviewed   BASIC METABOLIC PANEL (8) - Abnormal; Notable for the following components:       Result Value    Glucose 142 (*)     BUN 27 (*)     Creatinine 1.70 (*)     eGFR-Cr 40 (*)     All other components within normal limits   CBC W/ DIFFERENTIAL - Abnormal; Notable for the following components:    WBC 3.5 (*)     RBC 3.72 (*)     HGB 11.7 (*)     HCT 34.2 (*)     Lymphocyte Absolute 0.82 (*)     All other components within normal limits   TROPONIN I HIGH SENSITIVITY - Normal   CBC WITH DIFFERENTIAL WITH PLATELET    Narrative:     The following orders were created for panel order CBC With Differential With Platelet.  Procedure                               Abnormality         Status                     ---------                               -----------         ------                     CBC W/ DIFFERENTIAL[[id number]]          Abnormal            Final result                 Please view results for these tests on the individual orders.   RAINBOW DRAW LAVENDER   RAINBOW DRAW LIGHT GREEN     EKG    Rate, intervals and axes as noted on EKG Report.  Rate: 63  Rhythm: Sinus Rhythm  Reading: NSR, l normal axis, normal intervals, inferior Q waves, no ST segment changes, no STEMI.                 -I performed extensive chart review including recent clinic visits from primary care doctor and cardiology as well as recent admission to [facility].         MDM      83-year-old male with history as noted above presenting for evaluation of labile and low blood pressures.  Upon arrival his vital signs are stable and reassuring he is found to be normotensive.  -On examination he appears euvolemic, is in no acute distress  -Suspect his labile blood pressures are likely secondary to  his antihypertensive regimen.  Would opt to liberalize his blood pressure control in light of his age and labile blood pressures.  Reviewed options with family, will start by discontinuing hydrochlorothiazide, then to follow-up with cardiology.  I discussed this plan with the physician on-call for patient's cardiologist who agrees with the plan.  Patient was monitored for prolonged period of time in the emergency department and showed no signs of hypotension or hemodynamic instability.  Return precautions and follow-up instructions were discussed with patient who voiced understanding and agreement the plan.  All questions were answered to patient satisfaction.                                 Medical Decision Making      Disposition and Plan     Clinical Impression:  1. Encounter for medication management         Disposition:  Discharge  1/22/2024  3:46 pm    Follow-up:  Tab Adame MD  [address]  [place] [address]  [phone]    Follow up      [facility] Emergency Department  [address] [facility] [address]  [phone]  Follow up  As needed, If symptoms worsen    We recommend that you schedule follow up care with a primary care provider within the next three months to obtain basic health screening including reassessment of your blood pressure.      Medications Prescribed:  Discharge Medication List as of 1/22/2024  3:58 PM

## 2024-08-01 ENCOUNTER — HOSPITAL ENCOUNTER (INPATIENT)
Facility: HOSPITAL | Age: 84
LOS: 1 days | Discharge: HOME HEALTH | End: 2024-08-09
Attending: EMERGENCY MEDICINE | Admitting: INTERNAL MEDICINE
Payer: MEDICARE

## 2024-08-01 ENCOUNTER — APPOINTMENT (OUTPATIENT)
Dept: GENERAL RADIOLOGY | Facility: HOSPITAL | Age: 84
End: 2024-08-01
Attending: EMERGENCY MEDICINE
Payer: MEDICARE

## 2024-08-01 ENCOUNTER — APPOINTMENT (OUTPATIENT)
Dept: GENERAL RADIOLOGY | Facility: HOSPITAL | Age: 84
End: 2024-08-01
Payer: MEDICARE

## 2024-08-01 ENCOUNTER — HOSPITAL ENCOUNTER (INPATIENT)
Facility: HOSPITAL | Age: 84
LOS: 1 days | Discharge: HOME | End: 2024-08-09
Attending: EMERGENCY MEDICINE | Admitting: INTERNAL MEDICINE
Payer: MEDICARE

## 2024-08-01 DIAGNOSIS — R55 SYNCOPE, NEAR: Primary | ICD-10-CM

## 2024-08-01 DIAGNOSIS — E86.0 DEHYDRATION: ICD-10-CM

## 2024-08-01 LAB
ANION GAP SERPL CALC-SCNC: 9 MMOL/L (ref 0–18)
BASOPHILS # BLD AUTO: 0.02 X10(3) UL (ref 0–0.2)
BASOPHILS NFR BLD AUTO: 0.4 %
BUN BLD-MCNC: 26 MG/DL (ref 9–23)
BUN/CREAT SERPL: 12.9 (ref 10–20)
CALCIUM BLD-MCNC: 8.8 MG/DL (ref 8.7–10.4)
CHLORIDE SERPL-SCNC: 110 MMOL/L (ref 98–112)
CO2 SERPL-SCNC: 22 MMOL/L (ref 21–32)
CREAT BLD-MCNC: 2.01 MG/DL
DEPRECATED RDW RBC AUTO: 47.2 FL (ref 35.1–46.3)
EGFRCR SERPLBLD CKD-EPI 2021: 32 ML/MIN/1.73M2 (ref 60–?)
EOSINOPHIL # BLD AUTO: 0.04 X10(3) UL (ref 0–0.7)
EOSINOPHIL NFR BLD AUTO: 0.9 %
ERYTHROCYTE [DISTWIDTH] IN BLOOD BY AUTOMATED COUNT: 14 % (ref 11–15)
GLUCOSE BLD-MCNC: 138 MG/DL (ref 70–99)
HCT VFR BLD AUTO: 36.6 %
HGB BLD-MCNC: 12.4 G/DL
IMM GRANULOCYTES # BLD AUTO: 0.01 X10(3) UL (ref 0–1)
IMM GRANULOCYTES NFR BLD: 0.2 %
LYMPHOCYTES # BLD AUTO: 0.99 X10(3) UL (ref 1–4)
LYMPHOCYTES NFR BLD AUTO: 21.1 %
MAGNESIUM SERPL-MCNC: 2.2 MG/DL (ref 1.6–2.6)
MCH RBC QN AUTO: 31.2 PG (ref 26–34)
MCHC RBC AUTO-ENTMCNC: 33.9 G/DL (ref 31–37)
MCV RBC AUTO: 92 FL
MONOCYTES # BLD AUTO: 0.56 X10(3) UL (ref 0.1–1)
MONOCYTES NFR BLD AUTO: 11.9 %
NEUTROPHILS # BLD AUTO: 3.08 X10 (3) UL (ref 1.5–7.7)
NEUTROPHILS # BLD AUTO: 3.08 X10(3) UL (ref 1.5–7.7)
NEUTROPHILS NFR BLD AUTO: 65.5 %
OSMOLALITY SERPL CALC.SUM OF ELEC: 299 MOSM/KG (ref 275–295)
PLATELET # BLD AUTO: 193 10(3)UL (ref 150–450)
POTASSIUM SERPL-SCNC: 4.2 MMOL/L (ref 3.5–5.1)
RBC # BLD AUTO: 3.98 X10(6)UL
SODIUM SERPL-SCNC: 141 MMOL/L (ref 136–145)
TROPONIN I SERPL HS-MCNC: 12 NG/L
TROPONIN I SERPL HS-MCNC: 13 NG/L
WBC # BLD AUTO: 4.7 X10(3) UL (ref 4–11)

## 2024-08-01 PROCEDURE — 71045 X-RAY EXAM CHEST 1 VIEW: CPT | Performed by: EMERGENCY MEDICINE

## 2024-08-01 RX ORDER — HEPARIN SODIUM 5000 [USP'U]/ML
5000 INJECTION, SOLUTION INTRAVENOUS; SUBCUTANEOUS EVERY 8 HOURS SCHEDULED
Status: DISCONTINUED | OUTPATIENT
Start: 2024-08-02 | End: 2024-08-09

## 2024-08-01 RX ORDER — ESCITALOPRAM OXALATE 5 MG/1
5 TABLET ORAL DAILY
Status: DISCONTINUED | OUTPATIENT
Start: 2024-08-02 | End: 2024-08-02

## 2024-08-01 RX ORDER — ROSUVASTATIN CALCIUM 10 MG/1
10 TABLET, COATED ORAL NIGHTLY
Status: DISCONTINUED | OUTPATIENT
Start: 2024-08-02 | End: 2024-08-02

## 2024-08-01 RX ORDER — EZETIMIBE 10 MG/1
10 TABLET ORAL NIGHTLY
Status: DISCONTINUED | OUTPATIENT
Start: 2024-08-02 | End: 2024-08-09

## 2024-08-01 RX ORDER — ASPIRIN 81 MG/1
81 TABLET ORAL DAILY
Status: DISCONTINUED | OUTPATIENT
Start: 2024-08-02 | End: 2024-08-09

## 2024-08-01 RX ORDER — PANTOPRAZOLE SODIUM 40 MG/1
40 TABLET, DELAYED RELEASE ORAL 2 TIMES DAILY
Status: DISCONTINUED | OUTPATIENT
Start: 2024-08-02 | End: 2024-08-09

## 2024-08-01 RX ORDER — SODIUM CHLORIDE 9 MG/ML
INJECTION, SOLUTION INTRAVENOUS CONTINUOUS
Status: DISCONTINUED | OUTPATIENT
Start: 2024-08-02 | End: 2024-08-03

## 2024-08-02 ENCOUNTER — APPOINTMENT (OUTPATIENT)
Dept: CV DIAGNOSTICS | Facility: HOSPITAL | Age: 84
End: 2024-08-02
Attending: INTERNAL MEDICINE
Payer: MEDICARE

## 2024-08-02 ENCOUNTER — APPOINTMENT (OUTPATIENT)
Dept: CT IMAGING | Facility: HOSPITAL | Age: 84
End: 2024-08-02
Attending: Other
Payer: MEDICARE

## 2024-08-02 ENCOUNTER — APPOINTMENT (OUTPATIENT)
Dept: CT IMAGING | Facility: HOSPITAL | Age: 84
End: 2024-08-02
Attending: HOSPITALIST
Payer: MEDICARE

## 2024-08-02 ENCOUNTER — APPOINTMENT (OUTPATIENT)
Dept: MRI IMAGING | Facility: HOSPITAL | Age: 84
End: 2024-08-02
Attending: Other
Payer: MEDICARE

## 2024-08-02 PROBLEM — R29.6 RECURRENT FALLS: Status: ACTIVE | Noted: 2024-08-02

## 2024-08-02 PROBLEM — I95.1 ORTHOSTATIC INTOLERANCE: Status: ACTIVE | Noted: 2024-08-02

## 2024-08-02 PROBLEM — R41.3 MEMORY DEFICITS: Status: ACTIVE | Noted: 2024-08-02

## 2024-08-02 LAB
ALBUMIN SERPL-MCNC: 4.2 G/DL (ref 3.2–4.8)
ALBUMIN/GLOB SERPL: 1.7 {RATIO} (ref 1–2)
ALP LIVER SERPL-CCNC: 93 U/L
ALT SERPL-CCNC: 13 U/L
ANION GAP SERPL CALC-SCNC: 5 MMOL/L (ref 0–18)
ANION GAP SERPL CALC-SCNC: 6 MMOL/L (ref 0–18)
APTT PPP: 27.5 SECONDS (ref 23–36)
AST SERPL-CCNC: 17 U/L (ref ?–34)
ATRIAL RATE: 58 BPM
BASOPHILS # BLD AUTO: 0.01 X10(3) UL (ref 0–0.2)
BASOPHILS NFR BLD AUTO: 0.3 %
BILIRUB SERPL-MCNC: 0.7 MG/DL (ref 0.2–1.1)
BUN BLD-MCNC: 20 MG/DL (ref 9–23)
BUN BLD-MCNC: 21 MG/DL (ref 9–23)
BUN/CREAT SERPL: 12.8 (ref 10–20)
BUN/CREAT SERPL: 14.1 (ref 10–20)
CALCIUM BLD-MCNC: 9 MG/DL (ref 8.7–10.4)
CALCIUM BLD-MCNC: 9.8 MG/DL (ref 8.7–10.4)
CHLORIDE SERPL-SCNC: 110 MMOL/L (ref 98–112)
CHLORIDE SERPL-SCNC: 111 MMOL/L (ref 98–112)
CO2 SERPL-SCNC: 24 MMOL/L (ref 21–32)
CO2 SERPL-SCNC: 25 MMOL/L (ref 21–32)
CREAT BLD-MCNC: 1.42 MG/DL
CREAT BLD-MCNC: 1.64 MG/DL
DEPRECATED RDW RBC AUTO: 47.9 FL (ref 35.1–46.3)
DEPRECATED RDW RBC AUTO: 48.7 FL (ref 35.1–46.3)
EGFRCR SERPLBLD CKD-EPI 2021: 41 ML/MIN/1.73M2 (ref 60–?)
EGFRCR SERPLBLD CKD-EPI 2021: 49 ML/MIN/1.73M2 (ref 60–?)
EOSINOPHIL # BLD AUTO: 0.13 X10(3) UL (ref 0–0.7)
EOSINOPHIL NFR BLD AUTO: 4.3 %
ERYTHROCYTE [DISTWIDTH] IN BLOOD BY AUTOMATED COUNT: 14.2 % (ref 11–15)
ERYTHROCYTE [DISTWIDTH] IN BLOOD BY AUTOMATED COUNT: 14.3 % (ref 11–15)
EST. AVERAGE GLUCOSE BLD GHB EST-MCNC: 123 MG/DL (ref 68–126)
GLOBULIN PLAS-MCNC: 2.5 G/DL (ref 2–3.5)
GLUCOSE BLD-MCNC: 86 MG/DL (ref 70–99)
GLUCOSE BLD-MCNC: 94 MG/DL (ref 70–99)
GLUCOSE BLDC GLUCOMTR-MCNC: 107 MG/DL (ref 70–99)
HBA1C MFR BLD: 5.9 % (ref ?–5.7)
HCT VFR BLD AUTO: 35.1 %
HCT VFR BLD AUTO: 36.8 %
HGB BLD-MCNC: 11.5 G/DL
HGB BLD-MCNC: 12.1 G/DL
IMM GRANULOCYTES # BLD AUTO: 0.01 X10(3) UL (ref 0–1)
IMM GRANULOCYTES NFR BLD: 0.3 %
INR BLD: 1.06 (ref 0.8–1.2)
LYMPHOCYTES # BLD AUTO: 0.66 X10(3) UL (ref 1–4)
LYMPHOCYTES NFR BLD AUTO: 21.7 %
MCH RBC QN AUTO: 30.5 PG (ref 26–34)
MCH RBC QN AUTO: 30.7 PG (ref 26–34)
MCHC RBC AUTO-ENTMCNC: 32.8 G/DL (ref 31–37)
MCHC RBC AUTO-ENTMCNC: 32.9 G/DL (ref 31–37)
MCV RBC AUTO: 93.1 FL
MCV RBC AUTO: 93.4 FL
MONOCYTES # BLD AUTO: 0.37 X10(3) UL (ref 0.1–1)
MONOCYTES NFR BLD AUTO: 12.2 %
NEUTROPHILS # BLD AUTO: 1.86 X10 (3) UL (ref 1.5–7.7)
NEUTROPHILS # BLD AUTO: 1.86 X10(3) UL (ref 1.5–7.7)
NEUTROPHILS NFR BLD AUTO: 61.2 %
OSMOLALITY SERPL CALC.SUM OF ELEC: 292 MOSM/KG (ref 275–295)
OSMOLALITY SERPL CALC.SUM OF ELEC: 295 MOSM/KG (ref 275–295)
P AXIS: 53 DEGREES
P-R INTERVAL: 150 MS
PLATELET # BLD AUTO: 157 10(3)UL (ref 150–450)
PLATELET # BLD AUTO: 161 10(3)UL (ref 150–450)
POTASSIUM SERPL-SCNC: 4.3 MMOL/L (ref 3.5–5.1)
POTASSIUM SERPL-SCNC: 4.5 MMOL/L (ref 3.5–5.1)
PROT SERPL-MCNC: 6.7 G/DL (ref 5.7–8.2)
PROTHROMBIN TIME: 14.5 SECONDS (ref 11.6–14.8)
Q-T INTERVAL: 448 MS
QRS DURATION: 80 MS
QTC CALCULATION (BEZET): 439 MS
R AXIS: 18 DEGREES
RBC # BLD AUTO: 3.77 X10(6)UL
RBC # BLD AUTO: 3.94 X10(6)UL
SODIUM SERPL-SCNC: 140 MMOL/L (ref 136–145)
SODIUM SERPL-SCNC: 141 MMOL/L (ref 136–145)
T AXIS: 11 DEGREES
TROPONIN I SERPL HS-MCNC: 12 NG/L
TROPONIN I SERPL HS-MCNC: 15 NG/L
VENTRICULAR RATE: 58 BPM
WBC # BLD AUTO: 3 X10(3) UL (ref 4–11)
WBC # BLD AUTO: 3.4 X10(3) UL (ref 4–11)

## 2024-08-02 PROCEDURE — 93306 TTE W/DOPPLER COMPLETE: CPT | Performed by: INTERNAL MEDICINE

## 2024-08-02 PROCEDURE — 70551 MRI BRAIN STEM W/O DYE: CPT | Performed by: OTHER

## 2024-08-02 PROCEDURE — 70496 CT ANGIOGRAPHY HEAD: CPT | Performed by: OTHER

## 2024-08-02 PROCEDURE — 99291 CRITICAL CARE FIRST HOUR: CPT | Performed by: OTHER

## 2024-08-02 PROCEDURE — 70498 CT ANGIOGRAPHY NECK: CPT | Performed by: OTHER

## 2024-08-02 PROCEDURE — 70450 CT HEAD/BRAIN W/O DYE: CPT | Performed by: HOSPITALIST

## 2024-08-02 RX ORDER — ONDANSETRON 2 MG/ML
4 INJECTION INTRAMUSCULAR; INTRAVENOUS EVERY 6 HOURS PRN
Status: DISCONTINUED | OUTPATIENT
Start: 2024-08-02 | End: 2024-08-02

## 2024-08-02 RX ORDER — METOCLOPRAMIDE HYDROCHLORIDE 5 MG/ML
5 INJECTION INTRAMUSCULAR; INTRAVENOUS EVERY 8 HOURS PRN
Status: DISCONTINUED | OUTPATIENT
Start: 2024-08-02 | End: 2024-08-02

## 2024-08-02 RX ORDER — CLOPIDOGREL BISULFATE 75 MG/1
75 TABLET ORAL DAILY
Status: DISCONTINUED | OUTPATIENT
Start: 2024-08-02 | End: 2024-08-09

## 2024-08-02 RX ORDER — ACETAMINOPHEN 325 MG/1
650 TABLET ORAL EVERY 4 HOURS PRN
Status: DISCONTINUED | OUTPATIENT
Start: 2024-08-02 | End: 2024-08-02

## 2024-08-02 RX ORDER — ACETAMINOPHEN 650 MG/1
650 SUPPOSITORY RECTAL EVERY 4 HOURS PRN
Status: DISCONTINUED | OUTPATIENT
Start: 2024-08-02 | End: 2024-08-02

## 2024-08-02 RX ORDER — IBUPROFEN 400 MG/1
400 TABLET, FILM COATED ORAL ONCE
Status: COMPLETED | OUTPATIENT
Start: 2024-08-02 | End: 2024-08-02

## 2024-08-02 RX ORDER — ONDANSETRON 2 MG/ML
4 INJECTION INTRAMUSCULAR; INTRAVENOUS EVERY 6 HOURS PRN
Status: DISCONTINUED | OUTPATIENT
Start: 2024-08-02 | End: 2024-08-09

## 2024-08-02 RX ORDER — METOCLOPRAMIDE HYDROCHLORIDE 5 MG/ML
5 INJECTION INTRAMUSCULAR; INTRAVENOUS EVERY 8 HOURS PRN
Status: DISCONTINUED | OUTPATIENT
Start: 2024-08-02 | End: 2024-08-09

## 2024-08-02 RX ORDER — ACETAMINOPHEN 650 MG/1
650 SUPPOSITORY RECTAL EVERY 4 HOURS PRN
Status: DISCONTINUED | OUTPATIENT
Start: 2024-08-02 | End: 2024-08-09

## 2024-08-02 RX ORDER — HYDRALAZINE HYDROCHLORIDE 20 MG/ML
10 INJECTION INTRAMUSCULAR; INTRAVENOUS EVERY 2 HOUR PRN
Status: DISCONTINUED | OUTPATIENT
Start: 2024-08-02 | End: 2024-08-03

## 2024-08-02 RX ORDER — HYDRALAZINE HYDROCHLORIDE 20 MG/ML
10 INJECTION INTRAMUSCULAR; INTRAVENOUS EVERY 2 HOUR PRN
Status: DISCONTINUED | OUTPATIENT
Start: 2024-08-02 | End: 2024-08-02

## 2024-08-02 RX ORDER — NIFEDIPINE 30 MG
30 TABLET, EXTENDED RELEASE ORAL DAILY
COMMUNITY
End: 2024-08-09

## 2024-08-02 RX ORDER — LABETALOL HYDROCHLORIDE 5 MG/ML
10 INJECTION, SOLUTION INTRAVENOUS EVERY 10 MIN PRN
Status: DISCONTINUED | OUTPATIENT
Start: 2024-08-02 | End: 2024-08-03

## 2024-08-02 RX ORDER — ACETAMINOPHEN 325 MG/1
650 TABLET ORAL EVERY 4 HOURS PRN
Status: DISCONTINUED | OUTPATIENT
Start: 2024-08-02 | End: 2024-08-09

## 2024-08-02 RX ORDER — LABETALOL HYDROCHLORIDE 5 MG/ML
10 INJECTION, SOLUTION INTRAVENOUS EVERY 10 MIN PRN
Status: DISCONTINUED | OUTPATIENT
Start: 2024-08-02 | End: 2024-08-02

## 2024-08-02 RX ORDER — IBUPROFEN 400 MG/1
400 TABLET, FILM COATED ORAL 3 TIMES DAILY PRN
Status: DISCONTINUED | OUTPATIENT
Start: 2024-08-02 | End: 2024-08-09

## 2024-08-02 NOTE — PLAN OF CARE
Problem: Patient Centered Care  Goal: Patient preferences are identified and integrated in the patient's plan of care  Description: Interventions:  - What would you like us to know as we care for you? My family is on vacation in [place]  - Provide timely, complete, and accurate information to patient/family  - Incorporate patient and family knowledge, values, beliefs, and cultural backgrounds into the planning and delivery of care  - Encourage patient/family to participate in care and decision-making at the level they choose  - Honor patient and family perspectives and choices  Outcome: Progressing     Problem: CARDIOVASCULAR - ADULT  Goal: Maintains optimal cardiac output and hemodynamic stability  Description: INTERVENTIONS:  - Monitor vital signs, rhythm, and trends  - Monitor for bleeding, hypotension and signs of decreased cardiac output  - Evaluate effectiveness of vasoactive medications to optimize hemodynamic stability  - Monitor arterial and/or venous puncture sites for bleeding and/or hematoma  - Assess quality of pulses, skin color and temperature  - Assess for signs of decreased coronary artery perfusion - ex. Angina  - Evaluate fluid balance, assess for edema, trend weights  Outcome: Progressing  Goal: Absence of cardiac arrhythmias or at baseline  Description: INTERVENTIONS:  - Continuous cardiac monitoring, monitor vital signs, obtain 12 lead EKG if indicated  - Evaluate effectiveness of antiarrhythmic and heart rate control medications as ordered  - Initiate emergency measures for life threatening arrhythmias  - Monitor electrolytes and administer replacement therapy as ordered  Outcome: Progressing     Problem: SAFETY ADULT - FALL  Goal: Free from fall injury  Description: INTERVENTIONS:  - Assess pt frequently for physical needs  - Identify cognitive and physical deficits and behaviors that affect risk of falls.  - Malin fall precautions as indicated by assessment.  - Educate pt/family  on patient safety including physical limitations  - Instruct pt to call for assistance with activity based on assessment  - Modify environment to reduce risk of injury  - Provide assistive devices as appropriate  - Consider OT/PT consult to assist with strengthening/mobility  - Encourage toileting schedule  Outcome: Progressing     Problem: NEUROLOGICAL - ADULT  Goal: Achieves stable or improved neurological status  Description: INTERVENTIONS  - Assess for and report changes in neurological status  - Initiate measures to prevent increased intracranial pressure  - Maintain blood pressure and fluid volume within ordered parameters to optimize cerebral perfusion and minimize risk of hemorrhage  - Monitor temperature, glucose, and sodium. Initiate appropriate interventions as ordered  8/2/2024 1845 by Emma Edgar, RN  Outcome: Progressing  8/2/2024 1845 by Emma Edgar, RN  Outcome: Progressing

## 2024-08-02 NOTE — H&P
[facility] Hospitalist H&P       CC: falls    PCP: Rome Morgan MD    History of Present Illness:   This is a 84-year-old male with history of coronary artery disease status post CABG 2014 then PCI in 2023, CKD, hypertension, hyperlipidemia, presents to the hospital for evaluation of multiple falls.  The patient lives at home with family however they are out of town on vacation.  The patient states the last few days he noticed that he has had a few falls at home.  Yesterday he states he could not get up from the fall and EMS was called.  Yesterday morning he felt like he could barely sit up in a chair.  The patient denies any chest pain, palpitations, or trouble breathing.  He has had multiple changes to his blood pressure medicines in the recent past.  He has noted symptoms of lightheadedness even as an outpatient and some medications were stopped.  He also tells me that he feels like his speech is not normal.  No trouble swallowing.  No focal weakness.  He denies any paresthesias.    Review of Systems  Comprehensive ROS reviewed and negative except for what's stated above.     PMH  coronary artery disease status post CABG 2014 then PCI in 2023, CKD, hypertension, hyperlipidemia    PSH  Past Surgical History:   Procedure Laterality Date    Bypass surgery  1/28/2014    CABG x 5 LIMA-LAD, SVG- 2nd diagonal, 1st OM, Posterior descending & RCA    Cabg      5 vessel bypass    Colonoscopy  2/3/12    small adenoma. Repeat 2017.    Colonoscopy N/A 4/20/2018    adenoma- repeat 5 yrs    Other surgical history  11-29-11    flow/us Dr. Gibson    Other surgical history  1-31-12    flow/us-Dr. Gibson    Other surgical history  4/12    HERNIA SX @ [facility]    Other surgical history  11/15/13    cysto-Dr. Gibson        ALL:  Allergies   Allergen Reactions    Thorazine [Chlorpromazine] CONFUSION and OTHER (SEE COMMENTS)     Per pt cva symptoms    Crestor [Rosuvastatin] PAIN and MYALGIA     Joint pain    Nexletol [Bempedoic  Acid] OTHER (SEE COMMENTS)     Reportedly knee pain and variable BP fluctuation     Pravachol [Pravastatin] PAIN and MYALGIA     Joint pain     Repatha [Evolocumab] DIARRHEA and MYALGIA          Zocor [Simvastatin] PAIN and MYALGIA     joint    Imdur [Isosorbide] OTHER (SEE COMMENTS)     Head ache    Praluent [Alirocumab] DIARRHEA    Statins OTHER (SEE COMMENTS)     Per patient after 2-4 days the medication affects knees and patient is unable to walk.    Sulfa Antibiotics RASH        Home Medications:  Outpatient Medications Marked as Taking for the 8/1/24 encounter (Hospital Encounter)   Medication Sig Dispense Refill    NIFEdipine ER 30 MG Oral Tablet 24 Hr Take 1 tablet (30 mg total) by mouth daily. Takes in afternoon      Clopidogrel Bisulfate 75 MG Oral Tab Take 1 tablet (75 mg total) by mouth daily. 90 tablet 3    ezetimibe 10 MG Oral Tab Take 1 tablet (10 mg total) by mouth nightly. 90 tablet 3    Metoprolol Succinate ER 25 MG Oral Tablet 24 Hr Take 1 tablet (25 mg total) by mouth daily. 90 tablet 3    aspirin 81 MG Oral Tab EC Take 1 tablet (81 mg total) by mouth daily.  0     Soc Hx  Social History     Tobacco Use    Smoking status: Never    Smokeless tobacco: Never   Substance Use Topics    Alcohol use: No     Alcohol/week: 0.0 standard drinks of alcohol        Fam Hx  Family History   Problem Relation Age of Onset    Heart Disorder Father         mi     OBJECTIVE:  /67 (BP Location: Left arm)   Pulse 54   Temp 97.9 °F (36.6 °C) (Oral)   Resp 17   Wt 173 lb 14.4 oz (78.9 kg)   SpO2 96%   BMI 23.59 kg/m²   General: Alert, no acute distress  HEENT: sclera anicteric  Lungs: clear to ausculation bilaterally  Heart: Regular rate and rhythm  Abdomen: soft, non tender  Extremities: No edema  Neuro: CN II-XII intact, 5/5 strength in bilateral extremities, normal sensation in face and extremities    Diagnostic Data:    CBC/Chem  Recent Labs   Lab 08/01/24  1920 08/02/24  0631   WBC 4.7 3.4*   HGB 12.4*  11.5*   MCV 92.0 93.1   .0 157.0       Recent Labs   Lab 08/01/24  1920 08/02/24  0631    141   K 4.2 4.3    111   CO2 22.0 24.0   BUN 26* 21   CREATSERUM 2.01* 1.64*   * 94   CA 8.8 9.0   MG 2.2  --      Radiology: XR CHEST AP PORTABLE  (CPT=71045)    Result Date: 8/1/2024  CONCLUSION: No acute cardiopulmonary abnormality.    Dictated by (CST): Michael Parks MD on 8/01/2024 at 7:56 PM     Finalized by (CST): Michael Parks MD on 8/01/2024 at 7:57 PM           ASSESSMENT / PLAN:   This is a 84-year-old male with history of coronary artery disease status post CABG 2014 then PCI in 2023, CKD, hypertension, hyperlipidemia, presents to the hospital for evaluation of multiple falls.     Falls  Syncope  -suspect more likely orthostasis and shifts in BP. However given he states he feels is speech is not the same we will order MRI brain   -obtain echocardiogram  -continue aspirin   -hold BP meds right now and trend, and we will re-evaluate meds. Also avoid drop in BP pending MRI brain   -PT/OT  -continue IV fluids, cr is improved today   -does not tolerate statin   Addendum: stroke alert called at ~130pm. No focal symptoms on my exam. Patient is complianing that he is having trouble prounouncing certain words. Maybe \"fuzzy\" vision, and memory issue  -CT scans ordered. Will change MRI to stat if possible  -neurology to see, discussed with them  -patient received aspirin already  -await CT before adding more antiplatelet  -per patient he doesn't tolerate statin apparently  -echo was already ordered  -monitor tele  -neuro checks     Coronary artery disease  -cabg 2014, PCI to RCA 2023  -on aspirin   -on zetia, intolerant to statin   -no chest pain     Hypertension  -will review and adjust accordingly here     JOHANA on CKD  -improved with saline    Fluids: 0.9 saline  DVT prophylaxis: heparin sub q  Code status: FULL     Critical care time 40 minutes including stroke code called at 130pm    Rina Ramsey  DO  [facility] Hospitalist

## 2024-08-02 NOTE — PHYSICAL THERAPY NOTE
PHYSICAL THERAPY EVALUATION - INPATIENT     Room Number: COF14/COF14-A  Evaluation Date: 8/2/2024  Type of Evaluation: Initial   Physician Order: PT Eval and Treat    Presenting Problem: syncope, presyncope     Reason for Therapy: Mobility Dysfunction and Discharge Planning    PHYSICAL THERAPY ASSESSMENT   Patient is a 84 year old male admitted 8/1/2024 for syncope/presyncope. MRI brain pending. Pt cleared for session by RN.   Prior to admission, patient's baseline is independent without AD.  Patient is currently functioning below baseline with transfers, gait, stair negotiation, standing prolonged periods, and performing household tasks.  Patient is requiring contact guard assist as a result of the following impairments: impaired static and dynamic standing balance, medical status, and symptomatic orthostatic hypotension .  Physical Therapy will continue to follow for duration of hospitalization.    Patient will benefit from continued skilled PT Services at discharge to promote prior level of function and safety with additional support and return home with home health PT.    PLAN  PT Treatment Plan: Patient education;Gait training;Strengthening;Stoop training;Stair training;Transfer training;Balance training  Rehab Potential : Good  Frequency (Obs): 5x/week    PHYSICAL THERAPY MEDICAL/SOCIAL HISTORY   History related to current admission: Hx of CABG 2014, PCI 2023. Pt reports 4-5 falls within past few days related to current symptoms of lightheadedness.      Problem List  Principal Problem:    Syncope, near  Active Problems:    Dehydration      HOME SITUATION  Home Situation  Type of Home: House  Home Layout: Multi-level;Able to live on main level  Stairs to Enter : 3  Railing: Yes  Stairs to Bedroom:  (pt stays on main level)  Lives With: Family;Daughter  Patient Owned Equipment: None     Prior Level of [place]: independent without AD. Lives at home with daughter and her family ( and 2 young children)  who are currently in [place] on vacation for the next week. Pt has been living alone. No other family support in area.     SUBJECTIVE  \"My speech is not right. My vision is approaching double but not.\"    PHYSICAL THERAPY EXAMINATION   OBJECTIVE  Precautions: Bed/chair alarm (orthostatic hypotension-symptomatic)  Fall Risk: High fall risk    WEIGHT BEARING RESTRICTION  Weight Bearing Restriction: None      PAIN ASSESSMENT  Ratin      COGNITION  Overall Cognitive Status:    appears WFL though pt is tangential with some level of difficulty redirecting    RANGE OF MOTION AND STRENGTH ASSESSMENT  Upper extremity ROM and strength are within functional limits   Lower extremity ROM is within functional limits   Lower extremity strength is within functional limits     BALANCE  Static Sitting: Good  Dynamic Sitting: Good  Static Standing: Fair  Dynamic Standing: Poor +     NEUROLOGICAL FINDINGS  No dysarthria noted by this author. Pt reports speech is \"slurred\". Some impaired level of fluency is noted intermittently.     ACTIVITY TOLERANCE  BP recumbent in bed: 166/85 +lightheaded   BP sitting EOB: 157/84. No change in level of lightheadedness  BP standin/71 report imbalance but pt with eyes closed- advised to open eyes with noted improvement in postural sway. Increased lightheadedness. Pt insistent on going to the bathroom.   BP end of session back in bed: 159/78 +lightheadedness.     AM-PAC '6-Clicks' INPATIENT SHORT FORM - BASIC MOBILITY  How much difficulty does the patient currently have...  Patient Difficulty: Turning over in bed (including adjusting bedclothes, sheets and blankets)?: None   Patient Difficulty: Sitting down on and standing up from a chair with arms (e.g., wheelchair, bedside commode, etc.): A Little   Patient Difficulty: Moving from lying on back to sitting on the side of the bed?: None   How much help from another person does the patient currently need...   Help from Another: Moving to  and from a bed to a chair (including a wheelchair)?: A Little   Help from Another: Need to walk in hospital room?: A Little   Help from Another: Climbing 3-5 steps with a railing?: A Little     AM-PAC Score:  Raw Score: 20   Approx Degree of Impairment: 35.83%   Standardized Score (AM-PAC Scale): 47.67   CMS Modifier (G-Code): CJ    FUNCTIONAL ABILITY STATUS  Functional Mobility/Gait Assessment  Gait Assistance: Contact guard assist  Distance (ft): 15x2  Assistive Device: Rolling walker;None  Pattern:  (slow, cautious)  Rolling: independent  Supine to Sit: supervision  Sit to Supine: supervision  Sit to Stand: contact guard assist    Exercise/Education Provided:  Functional activity tolerated  Posture  Transfer training    Skilled Therapy Provided: Pt with orthostatic hypotension but insistent on walking to bathroom. Ambulates with CGA and no AD. Toilet transfer performed with CGA. Advised use of RW. Ambulates with this device and CGA. Advised use of RW and out of bed with staff only. Returned to bed at end of session with all needs in reach and alarm on. RN updated on session as well as BP readings in session and recommendation for RW post DC.     The patient's Approx Degree of Impairment: 35.83% has been calculated based on documentation in the [facility] '6 clicks' Inpatient Basic Mobility Short Form.  Research supports that patients with this level of impairment may benefit from HOME. However given pt's recent imbalance and decline in functional status would benefit from HH pending progress during admission.   Final disposition will be made by interdisciplinary medical team.    Patient End of Session: In bed;Needs met;Call light within reach;RN aware of session/findings;All patient questions and concerns addressed;Alarm set    CURRENT GOALS  Goals to be met by: 8/9/24  Patient Goal Patient's self-stated goal is: to return home   Goal #1 Patient is able to demonstrate supine - sit EOB @ level: independent     Goal #1    Current Status    Goal #2 Patient is able to demonstrate transfers Sit to/from Stand at assistance level: modified independent with  LRAD     Goal #2  Current Status    Goal #3 Patient is able to ambulate 100 feet with assist device:  LRAD  at assistance level: independent   Goal #3   Current Status    Goal #4 Patient will negotiate 5 stairs/one curb w/ assistive device and supervision   Goal #4   Current Status    Goal #5 Patient to demonstrate independence with home activity/exercise instructions provided to patient in preparation for discharge.   Goal #5   Current Status    Goal #6    Goal #6  Current Status      Patient Evaluation Complexity Level:  History High - 3 or more personal factors and/or co-morbidities   Examination of body systems High - addressing a total of 4 or more elements   Clinical Presentation  High - Unstable   Clinical Decision Making  High Complexity     In agreement with functional outcome measures and clinical rationale, this evaluation involved High Complexity decision making due to 3+ personal factors/comorbidities, 4+ body structures involved/activity limitations, and unstable symptoms including vital sign response and varying levels of symptom severity resulting in decline in functional status .

## 2024-08-02 NOTE — ED QUICK NOTES
Orders for admission, patient is aware of plan and ready to go upstairs. Any questions, please call ED RN isabel at extension [phone].     Patient Covid vaccination status: Fully vaccinated     COVID Test Ordered in ED: None    COVID Suspicion at Admission: N/A    Running Infusions:      Mental Status/LOC at time of transport: 4/4      Other pertinent information:   Ambulatory at home, dizzy when standing    CIWA score: N/A   NIH score:  N/A

## 2024-08-02 NOTE — ED PROVIDER NOTES
Patient Seen in: [facility] Emergency Department      History     Chief Complaint   Patient presents with    Chest Pain Angina     Brought by OT ambulance, chest pain x4 days and today radiates to L arm      Stated Complaint: chest pain    Subjective:   HPI    84-year-old male with extensive cardiac history getting most of his care at [facility] who presents after he has been experiencing some chest pain today and 2 near syncopal episodes.  One was while standing while the other while getting and changing positions.  Family is out of town in [place].  He denies back pain.  No shortness of breath.  No recent medication changes.  No fever.    Objective:   No pertinent past medical history.            Past Surgical History:   Procedure Laterality Date    Bypass surgery  1/28/2014    CABG x 5 LIMA-LAD, SVG- 2nd diagonal, 1st OM, Posterior descending & RCA    Cabg      5 vessel bypass    Colonoscopy  2/3/12    small adenoma. Repeat 2017.    Colonoscopy N/A 4/20/2018    adenoma- repeat 5 yrs    Other surgical history  11-29-11    flow/us Dr. Gibson    Other surgical history  1-31-12    flow/us-Dr. Gibson    Other surgical history  4/12    HERNIA SX @ [facility]    Other surgical history  11/15/13    cysto-Dr. Gibson                Social History     Socioeconomic History    Marital status:    Tobacco Use    Smoking status: Never    Smokeless tobacco: Never   Substance and Sexual Activity    Alcohol use: No     Alcohol/week: 0.0 standard drinks of alcohol    Drug use: No     Social Determinants of Health     Financial Resource Strain: Low Risk  (12/30/2023)    Received [facility], [facility]    Financial Resource Strain     In the past year, have you or any family members you live with been unable to get any of the following when it was really needed? Check all that apply.: None   Food Insecurity: Low Risk  (12/30/2023)    [facility]    Food Insecurity     Within the past 12 months, you worried that your food would run out before you got money to buy more.  : Never true     Within the past 12 months, the food you bought just didn't last and you didn't have money to get more. : Never true   Transportation Needs: Not At Risk (12/30/2023)    [facility], [facility]    Transportation Needs     In the past 12 months, has lack of reliable transportation kept you from medical appointments, meetings, work or from getting things needed for daily living? : No   Social Connections: Low Risk  (12/30/2023)    Received from [facility], [facility]    Social Connections     How often do you see or talk to people that you care about and feel close to? (For example: talking to friends on the phone, visiting friends or family, going to [employer] or club meetings): 5 or more times a week              Review of Systems    Positive for stated Chief Complaint: Chest Pain Angina (Brought by OT ambulance, chest pain x4 days and today radiates to L arm )    Other systems are as noted in HPI.  Constitutional and vital signs reviewed.      All other systems reviewed and negative except as noted above.    Physical Exam     ED Triage Vitals [08/01/24 1915]   BP 98/53   Pulse 60   Resp 10   Temp 98.1 °F (36.7 °C)   Temp src Oral   SpO2 96 %   O2 Device None (Room air)       Current Vitals:   Vital Signs  BP: 130/73  Pulse: 56  Resp: 15  Temp: 98.1 °F (36.7 °C)  Temp src: Oral  MAP (mmHg): 91    Oxygen Therapy  SpO2: 96 %  O2 Device: None (Room air)            Physical Exam    Constitutional: Oriented to person, place, and time.  Appears well-developed. No distress.   Head: Normocephalic and atraumatic.   Eyes: Conjunctivae are normal. Pupils are equal, round, and reactive to light.   Neck: Normal range of motion. Neck supple.   Cardiovascular: Normal rate, regular rhythm and intact and equal distal  pulses.    Pulmonary/Chest: Effort normal. No respiratory distress.  No audible wheeze or crackles  Abdominal: Soft. There is no tenderness. There is no guarding.   Musculoskeletal: Normal range of motion. No edema or tenderness.   Neurological: Alert and oriented to person, place, and time.  No gross focal deficits  Skin: Skin is warm and dry.   Psychiatric: Normal mood and affect.  Behavior is normal.   Nursing note and vitals reviewed.    Differential diagnosis includes orthostatic or vasovagal syncope, near syncope, less likely cardiogenic syncope, dehydration, renal insufficiency, less likely ACS.      ED Course     Labs Reviewed   BASIC METABOLIC PANEL (8) - Abnormal; Notable for the following components:       Result Value    Glucose 138 (*)     BUN 26 (*)     Creatinine 2.01 (*)     Calculated Osmolality 299 (*)     eGFR-Cr 32 (*)     All other components within normal limits   CBC W/ DIFFERENTIAL - Abnormal; Notable for the following components:    HGB 12.4 (*)     HCT 36.6 (*)     RDW-SD 47.2 (*)     Lymphocyte Absolute 0.99 (*)     All other components within normal limits   TROPONIN I HIGH SENSITIVITY - Normal   MAGNESIUM - Normal   CBC WITH DIFFERENTIAL WITH PLATELET    Narrative:     The following orders were created for panel order CBC With Differential With Platelet.  Procedure                               Abnormality         Status                     ---------                               -----------         ------                     CBC W/ DIFFERENTIAL[[id number]]          Abnormal            Final result                 Please view results for these tests on the individual orders.   TROPONIN I HIGH SENSITIVITY   RAINBOW DRAW LAVENDER   RAINBOW DRAW LIGHT GREEN   RAINBOW DRAW BLUE   RAINBOW DRAW GOLD     EKG    Rate, intervals and axes as noted on EKG Report.  Rate: 58  Rhythm: Sinus Rhythm  Reading: No acute ST segment changes, there are lateral T wave inversions w/ unknown chronicity                  XR CHEST AP PORTABLE  (CPT=71045)    Result Date: 8/1/2024  PROCEDURE: XR CHEST AP PORTABLE  (CPT=71045) TIME: 739.   COMPARISON: None.  INDICATIONS: Chest pain x4 days.  TECHNIQUE:   Single view.   FINDINGS:  CARDIAC/VASC: The cardiac silhouette is not enlarged.  Coronary artery stents are present.  There are surgical clips over the cardiac silhouette compatible with prior CABG.  Unremarkable pulmonary vasculature. MEDIAST/AKOSUA:   Atherosclerotic aorta with no visible aneurysm.  LUNGS/PLEURA: Linear atelectasis or scar in the left lung base.  Otherwise, no significant pulmonary parenchymal abnormalities.  No effusion or pleural thickening. BONES: Mild degenerative disc disease and spondylosis without visible acute abnormalities.  Sternotomy wires are present. OTHER: Negative.          CONCLUSION: No acute cardiopulmonary abnormality.    Dictated by (CST): Michael Parks MD on 8/01/2024 at 7:56 PM     Finalized by (CST): Michael Parks MD on 8/01/2024 at 7:57 PM                  [facility]        Admission disposition: 8/1/2024 10:39 PM                                        Medical Decision Making  Patient looks very clinically well however he was symptomatic during initial orthostatics from sitting to standing and remained such after fluids.  He has some new T wave inversions laterally which may be old but I cannot confirm based on what information I have here.  Given his history of chest pain symptoms and EKG changes patient will be admitted overnight.  Second troponin pending.  He is agreeable.  I did message the hospitalist.  Dr. Caraballo is on [facility] for cardiology and was message as well    Problems Addressed:  Dehydration: acute illness or injury with systemic symptoms  Syncope, near: acute illness or injury with systemic symptoms    Amount and/or Complexity of Data Reviewed  External Data Reviewed: radiology and ECG.     Details: You are fine to go ahead once1/22/24 EKG w/o lateral T-wave inversions but the  presence of T wave flattening, otherwise the EKG is morphologically similar      By review of care everywhere notes the patient had a negative CT of the chest in December 2023  Labs: ordered. Decision-making details documented in ED Course.     Details: By my review there is no obvious evidence of pulmonary edema, pleural effusion, pneumothorax or focal infiltrate on x-ray imaging.  Radiology: ordered and independent interpretation performed. Decision-making details documented in ED Course.  ECG/medicine tests: ordered and independent interpretation performed. Decision-making details documented in ED Course.    Risk  Decision regarding hospitalization.        Disposition and Plan     Clinical Impression:  1. Syncope, near    2. Dehydration         Disposition:  Admit  8/1/2024 10:39 pm    Follow-up:  Rome Morgan MD  [address] [facility]  [address] [place] [id number]  [phone]    Call      We recommend that you schedule follow up care with a primary care provider within the next three months to obtain basic health screening including reassessment of your blood pressure.      Medications Prescribed:  Current Discharge Medication List                            Hospital Problems       Present on Admission  Date Reviewed: 3/16/2022            ICD-10-CM Noted POA    * (Principal) Syncope, near R55 8/1/2024 Unknown

## 2024-08-02 NOTE — CONSULTS
[facility]  [address] [place] [address], [address]  [facility] [address]  [phone]          STROKE  CONSULTATION NOTE  [facility]  part of [facility]    Report of Consultation    David Silvestre Patient Status:  Observation     2/15/1940 MRN [id number]    Location [facility] 3W/SW Attending Rina Ramsey DO    Hosp Day # 0 PCP Rome Morgan MD      Date of Admission:  2024  Date of Consult:  2024  Reason for Admission/Consultation: Recurrent syncope, fall and inability to stand after a fall, persistent memory deficits x 4 days  Requested by: Rina Ramsey DO  Name of Outside Hospital if Transferred: N/A  Time of patient arrival:  7:09 PM   on 24   Time of initial page: 1:35 PM PM on 24         Time of encounter: 2:28 PM on 24    _________________________________________________________________________________________    Chief Complaint:   Chief Complaint   Patient presents with    Chest Pain Angina     Brought by OT ambulance, chest pain x4 days and today radiates to L arm        HPI:  David Silvestre is a 84 year old right handed male w/ a pmhx sig. for CAD s/p CABG 5v   s/p JACOB  SVG RCA, statin intolerance, multiple admissions in the past for hypertension with labile blood pressures, HTN, HLD, CAD, and CKD, emitted for 4 days of?  Short-term and long-term memory deficits, recurrent falls including most recent fall with inability to stand, syncope and presyncope, and reported dysarthria seen as a stroke alert for reported worsening of the deficits which initially brought him in.  HPI as per patient, hospitalist who admitted the patient, ICU team responding to stroke alert, and chart review.    Last known well initially reported as 4 days ago.  At that time patient began having?  Long-term memory deficits.  He reports that he had difficulty remembering things such as the president's first name, and autobiographical details.  Denies  word finding difficulties or aphasia.  He also reports that his vision has been blurred recently.  Reports that he has had constant dizziness for the last 4 days.  He says that he is dizzy and that his eyes are \"swirling.\"  Unclear if he actually has oscillopsia.  He reports that when he opens his eyes there is a \"red/pinkish [place] that fades in and then its like stardust..\"    Stroke alert was called because his symptoms became more prominent.  Again these were the symptoms that he initially presented with.  At the stroke alert it was reported that his last known well was 4:30 PM.  His RN reported that he was dysarthric and that he may have had a slight left facial droop.  He reports that he was having difficulty with \"TH sounds.\"    NIH stroke scale was 0.  He was euglycemic.  Stat head CT and CTA head and neck were ordered.  Before the patient was brought down to CT the hospitalist who initially evaluated him this morning reevaluated the patient.  He did not appreciate any new focal deficits.  He did not appreciate any deficits in his language or dysarthria.    Of note, unfortunately his 38-year-old daughter recently  passed away from metastatic renal cell carcinoma.  This was in [place], and he slept in her hospital room for 2 days.  While he was there he complained of chest pain and had to be evaluated for his cardiac disease.    Prior stroke/TIAs (date, description):       Date  Topography/symptoms  Etiology      Vascular Risk Factors:   CKD  Dyslipidemia  Hypertension  Male sex at birth  Physical inactivity    Prior to admission, taking antithrombotic: Yes   Agent: Aspirin 81 and currently on Plavix and aspirin while in the hospital.  Prior to admission, taking statin: No   Agent: Cholesterol Meds: ezetimibe - 10 MG; ezetimibe Tabs - 10 MG      Prior to admission, taking antihypertensive: Yes   Agent (s):   Blood Pressure and Cardiac Medications            NIFEdipine ER 30 MG Oral Tablet 24 Hr     Metoprolol Succinate ER 25 MG Oral Tablet 24 Hr    nitroGLYCERIN 0.4 MG Sublingual SL Tab             Review and summation of prior records        ROS:  Pertinent positive and negatives per HPI.  All others were reviewed and negative.    Past Medical History:    BPH (benign prostatic hyperplasia)    CKD (chronic kidney disease)    Cold hands and feet    Coronary atherosclerosis of unspecified type of vessel, native or graft    Drug allergy    Hypertension    Hypervitaminosis D    Inguinal hernia without mention of obstruction or gangrene, unilateral or unspecified, (not specified as recurrent)    Mixed hyperlipidemia    Penicillin allergy    S/P coronary artery stent placement    Statin intolerance    Unspecified visual disturbance    Unspecified vitamin D deficiency    Vision abnormalities       Past Surgical History:   Procedure Laterality Date    Bypass surgery  1/28/2014    CABG x 5 LIMA-LAD, SVG- 2nd diagonal, 1st OM, Posterior descending & RCA    Cabg      5 vessel bypass    Colonoscopy  2/3/12    small adenoma. Repeat 2017.    Colonoscopy N/A 4/20/2018    adenoma- repeat 5 yrs    Other surgical history  11-29-11    flow/us Dr. Gibson    Other surgical history  1-31-12    flow/us-Dr. Gibson    Other surgical history  4/12    HERNIA SX @ [facility]    Other surgical history  11/15/13    cysto-Dr. Gibson       Family History   Problem Relation Age of Onset    Heart Disorder Father         mi       Social History     Socioeconomic History    Marital status:      Spouse name: Not on file    Number of children: Not on file    Years of education: Not on file    Highest education level: Not on file   Occupational History    Not on file   Tobacco Use    Smoking status: Never    Smokeless tobacco: Never   Substance and Sexual Activity    Alcohol use: No     Alcohol/week: 0.0 standard drinks of alcohol    Drug use: No    Sexual activity: Not on file   Other Topics Concern    Not on file   Social History Narrative    Not  on file     Social Determinants of Health     Financial Resource Strain: Low Risk  (12/30/2023)    Received from [facility], [facility]    Financial Resource Strain     In the past year, have you or any family members you live with been unable to get any of the following when it was really needed? Check all that apply.: None   Food Insecurity: No Food Insecurity (8/2/2024)    Food Insecurity     Food Insecurity: Never true   Transportation Needs: No Transportation Needs (8/2/2024)    Transportation Needs     Lack of Transportation: No     Car Seat: Not on file   Physical Activity: Not on file   Stress: Not on file   Social Connections: Low Risk  (12/30/2023)    [facility] from [facility], [facility]    Social Connections     How often do you see or talk to people that you care about and feel close to? (For example: talking to friends on the phone, visiting friends or family, going to Taoist or club meetings): 5 or more times a week   Housing Stability: Low Risk  (8/2/2024)    Housing Stability     Housing Instability: No     Housing Instability Emergency: Not on file     Crib or Bassinette: Not on file       Outpatient Medications  Current Outpatient Medications   Medication Instructions    aspirin 81 mg, Oral, Daily    clopidogrel (PLAVIX) 75 mg, Oral, Daily    COENZYME Q-10 OR Oral, daily    ezetimibe (ZETIA) 10 mg, Oral, Nightly    FISH OIL 1000 MG OR CAPS 2-3 CAPSULES DAILY    LORazepam (ATIVAN) 0.5 mg, Oral, As needed    Magnesium 500 mg, Oral, Daily    metoprolol succinate ER (TOPROL XL) 25 mg, Oral, Daily    Multiple Vitamin (ONE-DAILY MULTI VITAMINS) Oral Tab 1 tablet, Oral, Daily    NIFEdipine ER (ADALAT CC) 30 mg, Oral, Daily, Takes in afternoon<BR>    nitroglycerin (NITROSTAT) 0.4 mg, Sublingual, As needed    Saw Palmetto 450 MG Oral Cap Oral, Daily    Vitamin C (VITAMIN C) 500 mg, Oral, Daily        Inpatient Medications  No current outpatient medications on  file.        clopidogrel  75 mg Oral Daily    aspirin  81 mg Oral Daily    ezetimibe  10 mg Oral Nightly    pantoprazole  40 mg Oral BID    heparin  5,000 Units Subcutaneous Q8H JUSTICE         acetaminophen **OR** acetaminophen    labetalol    hydrALAzine    ondansetron    metoclopramide    Objective:  Last vitals and weight :  Vitals:    08/02/24 0837   BP: 132/67   Pulse: 54   Resp: 17   Temp: 97.9 °F (36.6 °C)       Exam:  - General: appears stated age and no distress  - CV: symmetric pulses   Carotids:   - Pulmonary: no signs of respiratory distress. Normal excursion of the chest.   Neurologic Exam  - Mental Status: Alert and attentive.  Oriented to person, place, time/date, situation, day of week, month of year, and year.  He could not tell me his age.  He began rambling when asked what the month was.  He was able to tell me it was August.  Speech is spontaneous, fluent, and prosodic. Comprehension and repetition intact. Phrase length and rate are normal. No paraphasic errors, neologisms, anomia, acalculia, apraxia, anosognosia, or R/L confusion. No neglect.   - Cranial Nerves: No gaze preference. Visual fields:normal  Pupils are 5 mm briskly constricting to 4 mm and equally round and reactive to light  in a well lit room. EOMI. No nystagmus. No ptosis. V1-V3 intact B/L to light touch.No pathological facial asymmetry. No flattening of the nasolabial fold. .  Hearing grossly intact.  Tongue midline. No atrophy or fasiculations of the tongue noted. Palate and uvula elevate symmetrically.  Shoulder shrug symmetric.  - Fundoscopic exam:normal w/o hemorrhages, exudates, or papilledema.No attenuation. No pallor.  - Motor:  normal tone, normal bulk. No interosseous wasting. No flattening of hypothenar eminences.       Right Left     Motor Strength   Deltoids 5 5  Triceps 5 5  Biceps 5 5  Wrist Extensors 5 5   5 5   B   Knee extensors 5 5        Pronator drift: No pronator drift   Index Rolling: No orbiting.   Finger  Taps: Finger taps are symmetric in rate and amplitude.    Rapid movements: Rapid/fine movements are symmetric. As expected their dominant hand is slightly faster.   Leg Drift: None   Foot Taps: Foot taps are symmetric.      Asterixis: No asterixis noted.   Tremor:     Reflexes:    C5 C6 C7  L4 S1   R 2+ 2+  2+ 2+   L 2+ 2+  2+ 2+   Adductor Spread: No adductor spread noted.    Frontal release signs:Not assessed.    Juancarlos's sign:absent   Nonsustained clonus: Absent   Sustained clonus: Absent   - Sensory:   Light touch: normal  Temperature: normal  Vibration: normal  - Cerebellum: No truncal ataxia. No titubations. No dysmetria, no dysdiadochokinesis. No overshoot.      - Plantar response: flexor bilaterally    NIH Stroke Scale  Person Administering Scale: Alberto Mancia DO  1a  Level of consciousness: 0 = Alert keenly responsive    1b. LOC questions:  1 = Answers one question correctly    1c. LOC commands: 0 = Performs both tasks correctly    2.  Best Gaze: 0 = Normal    3.  Visual: 0 = No visual loss     4. Facial Palsy: 0 = Normal symmetrical movement     5a.  Motor left arm: 0 = No drift; limb holds 90º(or 45º) for full 10 seconds   5b.  Motor right arm: 0 = No drift; limb holds 90º(or 45º) for full 10 seconds   6a. motor left le = No drift; leg holds 30º for full 5 seconds     6b  Motor right le = No drift; leg holds 30º for full 5 seconds     7. Limb Ataxia: 0 = Absent     8.  Sensory: 0 = Normal, no sensory loss     9. Best Language:  0 = No aphasia, normal      10. Dysarthria: 0 = Normal articulation   11. Extinction and Inattention: 0 = No abnormality     Total:   1     Modified Crosby Score: 0 - No symptoms.                               Data reviewed    ECG findings by monitor or 12-lead:  Ecg:   Results for orders placed or performed during the hospital encounter of 24   EKG 12 Lead   Result Value Ref Range    Ventricular rate 58 BPM    Atrial rate 58 BPM    P-R Interval 150 ms    QRS  Duration 80 ms    Q-T Interval 448 ms    QTC Calculation (Bezet) 439 ms    P Axis 53 degrees    R Axis 18 degrees    T Axis 11 degrees       Test results/Imaging:   Lab Results   Component Value Date/Time    TRIG 123.00 03/09/2021 10:06 AM    HDL 50 03/09/2021 10:06 AM    LDL 93 03/09/2021 10:06 AM    CHOLHDL 3 03/09/2021 10:06 AM     Recent Labs   Lab 08/01/24 1920 08/02/24  0631 08/02/24  1454   WBC 4.7 3.4* 3.0*   HGB 12.4* 11.5* 12.1*   HCT 36.6* 35.1* 36.8*   .0 157.0 161.0     Recent Labs   Lab 08/01/24 1920 08/02/24  0631 08/02/24  1454    141 140   K 4.2 4.3 4.5    111 110   CO2 22.0 24.0 25.0   BUN 26* 21 20   ALT  --   --  13   AST  --   --  17     No results found for: \"A1C\"  Last A1c value was  % done  .            CT STROKE BRAIN (NO IV)(CPT=70450)    Result Date: 8/2/2024  CONCLUSION:  1. No acute infarct or hemorrhage. 2. Mild changes of chronic small vessel disease in cerebral white matter. 3. Large vessel intracranial atherosclerosis.  This report was called immediately to patient's nurse Ieva at 2:03 p.m..    Dictated by (CST): Camilo Alex MD on 8/02/2024 at 2:00 PM     Finalized by (CST): Camilo Alex MD on 8/02/2024 at 2:04 PM           Performed an independent visualization of: MRI brain WO, CT brain WO, and CTA head/neck    Imaging revealed: Agree with radiology read.         David Silvestre is a 84 year old right handed male w/ a pmhx sig. for CAD s/p CABG 5v  2014 s/p JACOB 2023 SVG RCA, statin intolerance, multiple admissions in the past for hypertension with labile blood pressures, HTN, HLD, CAD, and CKD, amitted for 4 days of?  Short-term and long-term memory deficits, recurrent falls including most recent fall with inability to stand, syncope and presyncope, and reported dysarthria seen as a stroke alert for reported worsening of the deficits which initially brought him in.  HPI as per patient, hospitalist who admitted the patient, ICU team responding to stroke  alert, and chart review.  NIH on this author's exam was 1, because the patient cannot recall his age.  Do not think this was an acute ischemic stroke.  This was confirmed by his MRI of the brain that was negative for any acute infarct.  His symptoms are most likely due to combination of recurrent syncope/presyncope/orthostatic intolerance.  Recommend checking orthostatic vitals evaluate for neurogenic based on neurogenic orthostatic syncope.  Memory deficits may be related to anxiety or undiagnosed mood disorder.    Orthostatic hypotension can be neurogenic or nonneurogenic.  Neurogenic causes include Parkinson disease, small fiber/peripheral neuropathy, and other alpha-synucleinapathies such as pure autonomic failure.    Nonneurogenic causes include medications, anemia, loss of muscle bulk.  one way to distinguish between them is the change in heart rate over the change in blood pressure.      4 days of?  Short-term and long-term memory deficits, recurrent falls including most recent fall with inability to stand, syncope and presyncope,  Differential Diagnosis:  Not an acute ischemic stroke.  Given that the patient fell on the day prior to admission and could not stand, agree that it was worthwhile to investigate cerebrovascular disease/stroke as a possible etiology.  Not TIA based on the fact that his hospitalist reexamine the patient during the stroke code and did not appreciate any new focal deficits.  Greatly appreciate 's help.  Neurogenic versus nonneurogenic orthostatic syncope        Reperfusion therapy eligibility: patient is not eligible because: out of the  time window  not an acute ischemic stroke  not a candidate for thrombectomy because no large vessel occlusion  Agent and time of first antithrombotic administration (or contraindication):   Aspirin 325 once or rectal aspirin 300 once. Starting tomorrow Aspirin 81 mg daily or rectal aspirin 300 mg daily if still n.p.o.  BP goal:   Normotension;  MAP > 65 SBP > 90  Additional brain and vascular imaging ordered:   MRI brain WO, CT brain WO, and CTA head/neck    Cardiac imaging: Telemetry   Additional labs ordered:   Labs: Fasting lipid panel and HgbA1C  Dysphagia status:   DysphagiaNo acute facial droop; per RN swallow evaluation.  Fluids/nutrition:   ivfstroke: AVOID Hypotonic fluids in patients with acute ischemic stroke or cerebral edema.  Hypotonic fluids can worsen cerebral edema.  This includes D5 W, half-normal saline, and lactated Ringer's.  If patients need glucose then please use normal saline.  DVT prophylaxis:   SCD's while in bed  Therapy/rehab services ordered (speech/PT/OT/rehab consult):   Physical therapy, Occupational Therapy, speech therapy evaluations ordered.   maintain oxygen saturation >94%. No supplemental oxygen  in nonhypoxic patients with acute ischemic stroke (AIS).  Tx hyperthermia (temperature >38°C) and identify source  Evidence indicates that persistent in-hospital hyperglycemia during the first 24 hours after AIS is associated with worse outcomes than normoglycemia and thus, it is reasonable to treat hyperglycemia to achieve blood glucose levels in a range of 140 to 180 mg/dL and to closely monitor to prevent hypoglycemia in patients with AIS.  Neuro checks Q4.  Telemetry.NIH stroke scale per protocol.  Other: Check orthostatic vitals.       Education/Instructions given to: {Persons; family members:patient   Barriers to Learning:None  Content: Refer to note above.  Also provided education on recurrent stroke signs and symptoms, including but not limited to a facial droop, dysarthria, difficulty talking, word finding difficulties, weakness, falls, change in sensation. Pt should call 911 or be taken to the nearest emergency department if sx occur.  Evaluation/Outcome: Verbalized understanding    This document is not intended to support charting by exception.  Sections left blank in a completed note should be presumed not to have  been done.    Critical care time exceeds 35 minutes.    Disclaimer:   This record was dictated using Dragon software. There may be errors due to voice recognition problems that were not realized and corrected during the completion of the note.       Thank you.  Alberto Mancia DO   Staff Vascular & General Neurology

## 2024-08-02 NOTE — PLAN OF CARE
Admitted for chest pain and syncopal episodes  SB on tele HR 50's  Orthostatic positive   Denies CP.   0.9 NS infusing at 100 ml/hr  SBA to BR-unsteady. Pt states he feels unsteady     Problem: Patient Centered Care  Goal: Patient preferences are identified and integrated in the patient's plan of care  Description: Interventions:  - What would you like us to know as we care for you? Live at home with family   - Provide timely, complete, and accurate information to patient/family  - Incorporate patient and family knowledge, values, beliefs, and cultural backgrounds into the planning and delivery of care  - Encourage patient/family to participate in care and decision-making at the level they choose  - Honor patient and family perspectives and choices  Outcome: Progressing     Problem: CARDIOVASCULAR - ADULT  Goal: Maintains optimal cardiac output and hemodynamic stability  Description: INTERVENTIONS:  - Monitor vital signs, rhythm, and trends  - Monitor for bleeding, hypotension and signs of decreased cardiac output  - Evaluate effectiveness of vasoactive medications to optimize hemodynamic stability  - Monitor arterial and/or venous puncture sites for bleeding and/or hematoma  - Assess quality of pulses, skin color and temperature  - Assess for signs of decreased coronary artery perfusion - ex. Angina  - Evaluate fluid balance, assess for edema, trend weights  Outcome: Progressing  Goal: Absence of cardiac arrhythmias or at baseline  Description: INTERVENTIONS:  - Continuous cardiac monitoring, monitor vital signs, obtain 12 lead EKG if indicated  - Evaluate effectiveness of antiarrhythmic and heart rate control medications as ordered  - Initiate emergency measures for life threatening arrhythmias  - Monitor electrolytes and administer replacement therapy as ordered  Outcome: Progressing

## 2024-08-02 NOTE — SPIRITUAL CARE NOTE
Spiritual Care Visit Note    Patient Name: David Silvestre Date of Spiritual Care Visit: 24   : 2/15/1940 Primary Dx: Syncope, near       Referred By:  Stroke Alert        Visit Type/Summary:     responded to Stroke Alert. Patient busy with staff at this time.    - Spiritual Care: Responded to a request via the on call phone  remains available as needed for follow up.    Spiritual Care support can be requested via an Epic consult.   For urgent/immediate needs, please contact the On Call  at: [facility]: ext [phone]    Chaplain Hilary.

## 2024-08-02 NOTE — CONSULTS
[facility] CARDIOLOGY CONSULT      David Silvestre is a 84 year old male who I assessed as follows:  Chief Complaint   Patient presents with    Chest Pain Angina     Brought by OT ambulance, chest pain x4 days and today radiates to L arm           REASON FOR CONSULTATION:    syncope/presyncope            ASSESSMENT/PLAN:     IMPRESSIONS:  Syncope and Presyncope  CAD s/p CABG 5v  2014 s/p JACOB 2023 SVG RCA  JOHANA on CKD  HL, can only tolerate 1-2 days per week on statin  HTN, has had low and elevated BP past year with multiple med changes        PLAN:  ECG reviewed  Agree with IVF  Ambulate with assistance/PT as needed  Follow renal function  Echo  Consideration for statin alternative can be dealt with as outpatient  BP meds reviewed. I am unclear exactly what he is taking at home. Has had numerous med changes past year with low and elevated BP. Would avoid diuretic in future        Patient Dr Caraballo            --------------------------------------------------------------------------------------------------------------------------------    HPI:         History of CAD s/p CABG 2014 then JACOB 2023 presents with at least 4 days of presyncope on standing (\"imbalance\" on standing up, then falling down). Did have syncope yesterday. Episodes only on standing. Denies dyspnea, palpitations. He states maybe minimal chest discomfort.     Has had multiple BP changes past year. Admitted 12/23 to [facility] with chest pain and had elevated BP. Meds added then discontinued over next month due to lightheadedness symptoms.            No current outpatient medications on file.      Past Medical History:    BPH (benign prostatic hyperplasia)    CKD (chronic kidney disease)    Cold hands and feet    Coronary atherosclerosis of unspecified type of vessel, native or graft    Drug allergy    Hypertension    Hypervitaminosis D    Inguinal hernia without mention of obstruction or gangrene, unilateral or unspecified, (not specified as  recurrent)    Mixed hyperlipidemia    Penicillin allergy    S/P coronary artery stent placement    Statin intolerance    Unspecified visual disturbance    Unspecified vitamin D deficiency    Vision abnormalities     Past Surgical History:   Procedure Laterality Date    Bypass surgery  1/28/2014    CABG x 5 LIMA-LAD, SVG- 2nd diagonal, 1st OM, Posterior descending & RCA    Cabg      5 vessel bypass    Colonoscopy  2/3/12    small adenoma. Repeat 2017.    Colonoscopy N/A 4/20/2018    adenoma- repeat 5 yrs    Other surgical history  11-29-11    flow/us Dr. Gibson    Other surgical history  1-31-12    flow/us-Dr. Gibson    Other surgical history  4/12    HERNIA SX @ [facility]    Other surgical history  11/15/13    cysto-Dr. Gibson     Family History   Problem Relation Age of Onset    Heart Disorder Father         mi      Social History:  Social History     Socioeconomic History    Marital status:    Tobacco Use    Smoking status: Never    Smokeless tobacco: Never   Substance and Sexual Activity    Alcohol use: No     Alcohol/week: 0.0 standard drinks of alcohol    Drug use: No     Social Determinants of Health     Financial Resource Strain: Low Risk  (12/30/2023)    Received from [facility]    Financial Resource Strain     In the past year, have you or any family members you live with been unable to get any of the following when it was really needed? Check all that apply.: None   Food Insecurity: No Food Insecurity (8/2/2024)    Food Insecurity     Food Insecurity: Never true   Transportation Needs: No Transportation Needs (8/2/2024)    Transportation Needs     Lack of Transportation: No   Social Connections: Low Risk  (12/30/2023)    Received from [facility], [facility]    Social Connections     How often do you see or talk to people that you care about and feel close to? (For example: talking to friends on the phone, visiting friends or family, going to [employer]  or club meetings): 5 or more times a week   Housing Stability: Low Risk  (8/2/2024)    Housing Stability     Housing Instability: No          Medications:  Current Facility-Administered Medications   Medication Dose Route Frequency    aspirin DR tab 81 mg  81 mg Oral Daily    escitalopram (Lexapro) tab 5 mg  5 mg Oral Daily    ezetimibe (Zetia) tab 10 mg  10 mg Oral Nightly    pantoprazole (Protonix) DR tab 40 mg  40 mg Oral BID    rosuvastatin (Crestor) tab 10 mg  10 mg Oral Nightly    sodium chloride 0.9% infusion   Intravenous Continuous    heparin (Porcine) 5000 UNIT/ML injection 5,000 Units  5,000 Units Subcutaneous Q8H JUSTICE           Allergies  Allergies   Allergen Reactions    Thorazine [Chlorpromazine] CONFUSION and OTHER (SEE COMMENTS)     Per pt cva symptoms    Crestor [Rosuvastatin] PAIN and MYALGIA     Joint pain    Nexletol [Bempedoic Acid] OTHER (SEE COMMENTS)     Reportedly knee pain and variable BP fluctuation     Pravachol [Pravastatin] PAIN and MYALGIA     Joint pain     Repatha [Evolocumab] DIARRHEA and MYALGIA          Zocor [Simvastatin] PAIN and MYALGIA     joint    Imdur [Isosorbide] OTHER (SEE COMMENTS)     Head ache    Praluent [Alirocumab] DIARRHEA    Statins OTHER (SEE COMMENTS)     Per patient after 2-4 days the medication affects knees and patient is unable to walk.    Sulfa Antibiotics RASH               REVIEW OF SYSTEMS:   GENERAL HEALTH: no fevers  SKIN: denies any unusual skin lesions or rashes   EARS: no recent changes in hearing  EYE: no recent vision changes  THROAT: denies sore throat  RESPIRATORY: denies cough or shortness of breath with exertion  CARDIOVASCULAR: syncope  GI: denies abdominal pain, nausea and vomiting  NEURO: denies headaches and focal neurologic symptoms  PSYCH: no recent depression  ENDOCRINE: no change in sleep pattern  HEME: no easy bruising  All other systems reviewed and negative.          EXAM:         TELE:           /81 (BP Location: Right arm)    Pulse 55   Temp 97.9 °F (36.6 °C) (Oral)   Resp 18   Wt 173 lb 14.4 oz (78.9 kg)   SpO2 98%   BMI 23.59 kg/m²   Temp (24hrs), Av °F (36.7 °C), Min:97.9 °F (36.6 °C), Max:98.1 °F (36.7 °C)    Wt Readings from Last 3 Encounters:   24 173 lb 14.4 oz (78.9 kg)   24 160 lb (72.6 kg)   22 162 lb 11.2 oz (73.8 kg)         Intake/Output Summary (Last 24 hours) at 2024 0624  Last data filed at 2024 2236  Gross per 24 hour   Intake 1000 ml   Output --   Net 1000 ml         GENERAL: well developed, well nourished, in no apparent distress  SKIN: no rashes  HEENT: atraumatic, normocephalic, throat without erythema  NECK: supple, no bruits  LUNGS: clear to auscultation  CARDIO: RRR without murmur or S3   GI: soft, nontender  EXTREMITIES: no cyanosis, clubbing or edema  NEUROLOGY: alert  PSYCH: cooperative          LABORATORY DATA:               ECG:        ECHO:          Labs:  Recent Labs   Lab 24   *   BUN 26*   CREATSERUM 2.01*   CA 8.8      K 4.2      CO2 22.0     No results for input(s): \"TROP\" in the last 168 hours.  Recent Labs   Lab 24   RBC 3.98   HGB 12.4*   HCT 36.6*   MCV 92.0   MCH 31.2   MCHC 33.9   RDW 14.0   NEPRELIM 3.08   WBC 4.7   .0     Recent Labs   Lab 24  2233   TROPHS 13 12       Imaging:  XR CHEST AP PORTABLE  (CPT=71045)    Result Date: 2024  CONCLUSION: No acute cardiopulmonary abnormality.    Dictated by (CST): Michael Parks MD on 2024 at 7:56 PM     Finalized by (CST): Michael Parks MD on 2024 at 7:57 PM                CATH 3/23:  · Severe native multivessel disease. Patent LIMA-LAD, SVG-D1, and SVG-OM1.   There is a severe stenosis in the proximal SVG-RCA. The sequential portion   of the graft to the RPDA is occluded. The limb to the RPL is patent.   · Successful PCI of the proximal SVG-RCA with 3.5x30mm [place] JACOB x 1.     1) ASA 81mg and clopidogrel 75mg daily.   2) Post procedural  IVF in the setting of CKD   3) Cardiology inpatient service to follow.     Coronary Findings Diagnostic Dominance: Right   Left Main: Ost LM lesion with 30% stenosis.   Left Anterior Descending: Prox LAD to Mid LAD lesion with 95% stenosis. Partially in stent. First Diagonal Branch: 1st Diag lesion with 100% stenosis. The vessel is total chronically occluded.   Left Circumflex: Patent stents from the mid LCX into OM2. First Obtuse Marginal Branch: 1st Mrg lesion with 100% stenosis. The vessel is total chronically occluded.   Right Coronary Artery: Mid RCA lesion with 100% stenosis. The vessel is total chronically occluded.   Graft To 1st Mrg: The graft is free of disease.   Graft To Dist RCA: There is occlusion of the Y-portion of the graft to the RPDA. The portion to the RPL is patent. The proximal main graft has a severe stenosis. Prox Graft lesion with 80% stenosis.   Graft To 1st Diag: The graft exhibits minimal (20%) luminal irregularities.   LIMA Graft To Mid LAD: The graft is free of disease.     Intervention   Prox Graft lesion (Graft To Dist RCA): Stent: Post-Intervention Lesion Assessment: Pre-intervention KIZZY flow: 3. Post-intervention KIZZY flow is 3. There is a 0% residual stenosis post intervention.           Lamont Diallo MD

## 2024-08-02 NOTE — SIGNIFICANT EVENT
RRT    *See RRT Documentation Record*    Reason the RRT was called: Stroke alert, last known well 1230.    Assessment of patient leading up to RRT: Pt was disorientated, believed he was 46 years old, increased blurry vision in both eyes, unable to clearly pronounce words starting with \"Th\". Presented with slight left facial droop    Interventions/Testing: CTA Brain and Neck    Patient Outcome/Disposition: Refer to results, pt stable, symptoms resolved, neuro checks per protocol.    Family Notified: yes  Name of family notified: Harriet CARVER RN

## 2024-08-02 NOTE — PLAN OF CARE
NEUROLOGY PLAN OF CARE    Stroke code called for left facial droop, blurry vision and change in speech.  H&P 8/2/2024 also noted that patient said his speech is not normal, so LKW >24 hours.  Blurry vision and facial droop noted today with LKW 12:30 for those symptoms.  Overall, he is out of window for thrombolysis and thrombectomy.       Plan  Plan for CT stroke brain; CTA head/neck stroke.  Stroke order set   Continue Aspirin.  Cannot tolerate statins.   Addition of Plavix 75 mg daily to Aspirin for concerns of acute-subacute stroke.      MAK Byrnes DO  8/2/2024 1:50 PM

## 2024-08-02 NOTE — PLAN OF CARE
Admitted for chest pain and syncopal episodes  SB on tele HR 50's  Orthostatic positive   Denies CP.   0.9 NS infusing at 100 ml/hr  SBA to BR-unsteady. Pt states he feels lightheadedness.    Problem: Patient Centered Care  Goal: Patient preferences are identified and integrated in the patient's plan of care  Description: Interventions:  - What would you like us to know as we care for you? Live at home with family   - Provide timely, complete, and accurate information to patient/family  - Incorporate patient and family knowledge, values, beliefs, and cultural backgrounds into the planning and delivery of care  - Encourage patient/family to participate in care and decision-making at the level they choose  - Honor patient and family perspectives and choices  Outcome: Progressing     Problem: CARDIOVASCULAR - ADULT  Goal: Maintains optimal cardiac output and hemodynamic stability  Description: INTERVENTIONS:  - Monitor vital signs, rhythm, and trends  - Monitor for bleeding, hypotension and signs of decreased cardiac output  - Evaluate effectiveness of vasoactive medications to optimize hemodynamic stability  - Monitor arterial and/or venous puncture sites for bleeding and/or hematoma  - Assess quality of pulses, skin color and temperature  - Assess for signs of decreased coronary artery perfusion - ex. Angina  - Evaluate fluid balance, assess for edema, trend weights  Outcome: Progressing  Goal: Absence of cardiac arrhythmias or at baseline  Description: INTERVENTIONS:  - Continuous cardiac monitoring, monitor vital signs, obtain 12 lead EKG if indicated  - Evaluate effectiveness of antiarrhythmic and heart rate control medications as ordered  - Initiate emergency measures for life threatening arrhythmias  - Monitor electrolytes and administer replacement therapy as ordered  Outcome: Progressing

## 2024-08-03 PROBLEM — G45.9 TIA (TRANSIENT ISCHEMIC ATTACK): Status: ACTIVE | Noted: 2024-08-03

## 2024-08-03 PROBLEM — G92.8 TOXIC METABOLIC ENCEPHALOPATHY: Status: ACTIVE | Noted: 2024-08-03

## 2024-08-03 PROBLEM — G31.9 CEREBRAL ATROPHY: Status: ACTIVE | Noted: 2024-08-03

## 2024-08-03 PROBLEM — G31.9 CEREBRAL ATROPHY (HCC): Status: ACTIVE | Noted: 2024-08-03

## 2024-08-03 LAB
GLUCOSE BLDC GLUCOMTR-MCNC: 101 MG/DL (ref 70–99)
GLUCOSE BLDC GLUCOMTR-MCNC: 116 MG/DL (ref 70–99)
GLUCOSE BLDC GLUCOMTR-MCNC: 92 MG/DL (ref 70–99)
T PALLIDUM AB SER QL IA: NONREACTIVE
TSI SER-ACNC: 3.97 MIU/ML (ref 0.55–4.78)

## 2024-08-03 PROCEDURE — 99223 1ST HOSP IP/OBS HIGH 75: CPT | Performed by: OTHER

## 2024-08-03 RX ORDER — CARVEDILOL 12.5 MG/1
12.5 TABLET ORAL 2 TIMES DAILY WITH MEALS
Status: DISCONTINUED | OUTPATIENT
Start: 2024-08-03 | End: 2024-08-04

## 2024-08-03 NOTE — PROGRESS NOTES
[facility] Hospitalist Progress Note     CC: Hospital Follow up    PCP: Rome Moragn MD       Assessment/Plan:   This is a 84-year-old male with history of coronary artery disease status post CABG 2014 then PCI in 2023, CKD, hypertension, hyperlipidemia, presents to the hospital for evaluation of multiple falls.      Falls  Syncope  -suspect more likely orthostasis and shifts in BP. However given he stated he felt his speech was not the same we ordered MRI brain. Also stroke alert called 8/2/24 ~130pm   -monitor BP with BP med changes  =ambulate and mobilize  -PT/OT  -appreciate neurology and cards evals  -on dual antiplatelet for now incase he had TIA   -monitor orthostatics at least daily      Coronary artery disease  -cabg 2014, PCI to RCA 2023  -on aspirin   -on zetia, intolerant to statin   -no chest pain      Hypertension  -adjusting accordingly here      JOHANA on CKD  -improved with saline, can stop IV fluids     DVT prophylaxis: heparin sub q  Code status: FULL      Updated daughter evening of 8/2    Dispo: pending: follow therapy evals, may need placement until family back into town     Rina Haley Hospitalist      Subjective:     Feels ok. No new acute complaints.       OBJECTIVE:    Blood pressure 146/71, pulse 65, temperature 97.9 °F (36.6 °C), temperature source Oral, resp. rate 20, weight 173 lb 14.4 oz (78.9 kg), SpO2 96%.    Temp:  [97.4 °F (36.3 °C)-98.2 °F (36.8 °C)] 97.9 °F (36.6 °C)  Pulse:  [53-68] 65  Resp:  [12-20] 20  BP: (128-184)/(62-90) 146/71  SpO2:  [96 %-98 %] 96 %      Intake/Output:    Intake/Output Summary (Last 24 hours) at 8/3/2024 1002  Last data filed at 8/3/2024 0936  Gross per 24 hour   Intake 1553.33 ml   Output 1950 ml   Net -396.67 ml       Last 3 Weights   08/01/24 2353 173 lb 14.4 oz (78.9 kg)   01/22/24 1247 160 lb (72.6 kg)   03/16/22 0806 162 lb 11.2 oz (73.8 kg)       /71 (BP Location: Left arm)   Pulse 65   Temp 97.9 °F (36.6 °C) (Oral)    Resp 20   Wt 173 lb 14.4 oz (78.9 kg)   SpO2 96%   BMI 23.59 kg/m²   General: Alert, no acute distress  Lungs: clear to ausculation bilaterally  Heart: Regular rate and rhythm  Abdomen: soft, non tender, non distended   Extremities: No edema    Data Review:       Labs:     Recent Labs   Lab 08/01/24 1920 08/02/24  0631 08/02/24  1454   RBC 3.98 3.77* 3.94   HGB 12.4* 11.5* 12.1*   HCT 36.6* 35.1* 36.8*   MCV 92.0 93.1 93.4   MCH 31.2 30.5 30.7   MCHC 33.9 32.8 32.9   RDW 14.0 14.3 14.2   NEPRELIM 3.08  --  1.86   WBC 4.7 3.4* 3.0*   .0 157.0 161.0         Recent Labs   Lab 08/01/24 1920 08/02/24  0631 08/02/24  1454   * 94 86   BUN 26* 21 20   CREATSERUM 2.01* 1.64* 1.42*   EGFRCR 32* 41* 49*   CA 8.8 9.0 9.8    141 140   K 4.2 4.3 4.5    111 110   CO2 22.0 24.0 25.0       Recent Labs   Lab 08/02/24  1454   ALT 13   AST 17   ALB 4.2         Imaging:  MRI BRAIN (CPT=70551)    Result Date: 8/2/2024  CONCLUSION: No acute intracranial process.  No evidence of acute or subacute infarct. There are mild microvascular white matter ischemic changes, likely related to long-standing hypertension and/or diabetes.   Dictated by (CST): Michael Parks MD on 8/02/2024 at 4:43 PM     Finalized by (CST): Michael Parks MD on 8/02/2024 at 4:46 PM          CT STROKE BRAIN (NO IV)(CPT=70450)    Result Date: 8/2/2024  CONCLUSION:  1. No acute infarct or hemorrhage. 2. Mild changes of chronic small vessel disease in cerebral white matter. 3. Large vessel intracranial atherosclerosis.  This report was called immediately to patient's nurse Ieva at 2:03 p.m..    Dictated by (CST): Camilo Alex MD on 8/02/2024 at 2:00 PM     Finalized by (CST): Camilo Alex MD on 8/02/2024 at 2:04 PM          XR CHEST AP PORTABLE  (CPT=71045)    Result Date: 8/1/2024  CONCLUSION: No acute cardiopulmonary abnormality.    Dictated by (CST): Michael Parks MD on 8/01/2024 at 7:56 PM     Finalized by (CST): Michael Parks MD on  8/01/2024 at 7:57 PM             Meds:      carvedilol  12.5 mg Oral BID with meals    clopidogrel  75 mg Oral Daily    aspirin  81 mg Oral Daily    ezetimibe  10 mg Oral Nightly    pantoprazole  40 mg Oral BID    heparin  5,000 Units Subcutaneous Q8H JUSTICE         acetaminophen **OR** acetaminophen    ondansetron    metoclopramide    ibuprofen

## 2024-08-03 NOTE — CM/SW NOTE
08/03/24 1700   CM/SW Referral Data   Referral Source    Reason for Referral Discharge planning   Discharge Needs   Anticipated D/C needs Home health care       Anticipated therapy need: Home with Home Healthcare.    HH referrals started in aidin, if agreeable will need list for choice.    Anisha ARENASA BSN RN CRRN   RN Case Manager  [phone]

## 2024-08-03 NOTE — CONSULTS
[facility]  [address], [address]  [facility] [address]  [phone]          INPATIENT NEUROLOGY   INITIAL CONSULT NOTE       [facility]  part of [facility]    Report of Consultation    David Silvestre Patient Status:  Observation     2/15/1940 MRN [id number]    Location [facility] 3W/SW Attending Rina Ramsey DO    Hosp Day # 0 PCP Rome Morgan MD      Date of Admission:  2024  Date of Consult:  8/3/2024    HPI:     David Silvestre is a 84 year old man with past medical history of coronary artery disease, hypertension, hyperlipidemia, chronic kidney disease who presented with recurrent falls in the setting of orthostatic hypotension.  Patient was complaining of abnormal speech for several days.  Neurology consult was called as a stroke alert due to left facial droop noted 2024.    Patient says he has had about 3 days of abnormal speech, described as halting and slow.  No difficulties with comprehension.  Having mild paraphasic errors.  Needs to close his eyes to remember things he should know, like the president's name.  Disoriented to year.  Distressing for him.      He feels like things are still not quite right, like he is in \"[place] in [facility].\"  He has better memory to remote events.  For instance, he is able to recall why he is allergic to Thorazine.  He had heart surgery and afterwards had intractable hiccups.  When he was given Thorazine, he said he wanted to jump out of bed and dance with nurses.      CURRENT MEDICATIONS  No current outpatient medications on file.       OUTPATIENT MEDICATIONS  No current facility-administered medications on file prior to encounter.     Current Outpatient Medications on File Prior to Encounter   Medication Sig Dispense Refill    NIFEdipine ER 30 MG Oral Tablet 24 Hr Take 1 tablet (30 mg total) by mouth daily. Takes in afternoon      Clopidogrel Bisulfate 75 MG Oral Tab Take 1 tablet (75 mg total) by  mouth daily. 90 tablet 3    ezetimibe 10 MG Oral Tab Take 1 tablet (10 mg total) by mouth nightly. 90 tablet 3    Metoprolol Succinate ER 25 MG Oral Tablet 24 Hr Take 1 tablet (25 mg total) by mouth daily. 90 tablet 3    aspirin 81 MG Oral Tab EC Take 1 tablet (81 mg total) by mouth daily.  0    LORazepam 1 MG Oral Tab Take 0.5 tablets (0.5 mg total) by mouth as needed. (Patient taking differently: Take 0.25 mg by mouth as needed for Anxiety. For SBP >180) 30 tablet 0    nitroGLYCERIN 0.4 MG Sublingual SL Tab Place 1 tablet (0.4 mg total) under the tongue as needed for Chest pain. 90 tablet 3    Saw Palmetto 450 MG Oral Cap Take by mouth daily.      Multiple Vitamin (ONE-DAILY MULTI VITAMINS) Oral Tab Take 1 tablet by mouth daily.      Magnesium 500 MG Oral Tab Take 1 tablet (500 mg total) by mouth daily.      COENZYME Q-10 OR Take by mouth. daily      Vitamin C (VITAMIN C) 500 MG Oral Tab Take 1 tablet (500 mg total) by mouth daily.      FISH OIL 1000 MG OR CAPS 2-3 CAPSULES DAILY          MEDICAL HISTORY  Past Medical History:    BPH (benign prostatic hyperplasia)    CKD (chronic kidney disease)    Cold hands and feet    Coronary atherosclerosis of unspecified type of vessel, native or graft    Drug allergy    Hypertension    Hypervitaminosis D    Inguinal hernia without mention of obstruction or gangrene, unilateral or unspecified, (not specified as recurrent)    Mixed hyperlipidemia    Penicillin allergy    S/P coronary artery stent placement    Statin intolerance    Unspecified visual disturbance    Unspecified vitamin D deficiency    Vision abnormalities       ?SURGICAL HISTORY  Past Surgical History:   Procedure Laterality Date    Bypass surgery  1/28/2014    CABG x 5 LIMA-LAD, SVG- 2nd diagonal, 1st OM, Posterior descending & RCA    Cabg      5 vessel bypass    Colonoscopy  2/3/12    small adenoma. Repeat 2017.    Colonoscopy N/A 4/20/2018    adenoma- repeat 5 yrs    Other surgical history  11-29-11     [facility]/us Dr. Gibson    Other surgical history  1-31-12    [facility]/us-Dr. Gibson    Other surgical history  4/12    HERNIA SX @ [facility]    Other surgical history  11/15/13    cysto-Dr. Gibson       SOCIAL HISTORY  Social History     Socioeconomic History    Marital status:    Tobacco Use    Smoking status: Never    Smokeless tobacco: Never   Substance and Sexual Activity    Alcohol use: No     Alcohol/week: 0.0 standard drinks of alcohol    Drug use: No       FAMILY HISTORY  Family History   Problem Relation Age of Onset    Heart Disorder Father         mi       ALLERGIES  Allergies   Allergen Reactions    Thorazine [Chlorpromazine] CONFUSION and OTHER (SEE COMMENTS)     Per pt cva symptoms    Crestor [Rosuvastatin] PAIN and MYALGIA     Joint pain    Nexletol [Bempedoic Acid] OTHER (SEE COMMENTS)     Reportedly knee pain and variable BP fluctuation     Pravachol [Pravastatin] PAIN and MYALGIA     Joint pain     Repatha [Evolocumab] DIARRHEA and MYALGIA          Zocor [Simvastatin] PAIN and MYALGIA     joint    Imdur [Isosorbide] OTHER (SEE COMMENTS)     Head ache    Praluent [Alirocumab] DIARRHEA    Statins OTHER (SEE COMMENTS)     Per patient after 2-4 days the medication affects knees and patient is unable to walk.    Sulfa Antibiotics RASH       ?REVIEW OF SYSTEMS:   13-point review of systems was done and is negative unless otherwise stated in HPI.     ?PHYSICAL EXAM:     /65 (BP Location: Right arm)   Pulse 62   Temp 97.9 °F (36.6 °C) (Oral)   Resp 18   Wt 173 lb 14.4 oz (78.9 kg)   SpO2 96%   BMI 23.59 kg/m²   General appearance: Well appearing, and in no acute distress  Skin: skin color, texture normal.  No rashes or lesions.    Head: Normocephalic, atraumatic.    Neurological exam:  Mental Status: Alert, oriented to person and birthday but not year; Follows commands, and Speech fluent and appropriate but he has circumferential speech   Cranial Nerves:visual fields intact to confrontation, extraocular  movements intact, facial sensation intact, patient has a mild left facial droop with open mouthed smile but when smiles widely without opening mouth his smile is symmetric, reduced bedside auditory acuity bilaterally, no dysarthria  Motor: muscle strength 5/5 both upper and lower extremities, no drift  Reflexes: UE and LE reflexes are equal and reactive  Sensation: intact to light touch  Coordination: Finger-to- nose-finger intact bilaterally   Gait: not assessed       LABS/DATA:    Lab Results   Component Value Date    WBC 3.0 08/02/2024    HGB 12.1 08/02/2024    HCT 36.8 08/02/2024    .0 08/02/2024    CREATSERUM 1.42 08/02/2024    BUN 20 08/02/2024     08/02/2024    K 4.5 08/02/2024     08/02/2024    CO2 25.0 08/02/2024    GLU 86 08/02/2024    CA 9.8 08/02/2024    ALB 4.2 08/02/2024    ALKPHO 93 08/02/2024    BILT 0.7 08/02/2024    TP 6.7 08/02/2024    AST 17 08/02/2024    ALT 13 08/02/2024    PTT 27.5 08/02/2024    INR 1.06 08/02/2024       HGBA1C:    Lab Results   Component Value Date    A1C 5.9 (H) 08/02/2024     08/02/2024                IMAGING:  MRI BRAIN (CPT=70551)    Result Date: 8/2/2024  CONCLUSION: No acute intracranial process.  No evidence of acute or subacute infarct. There are mild microvascular white matter ischemic changes, likely related to long-standing hypertension and/or diabetes.   Dictated by (CST): Michael Parks MD on 8/02/2024 at 4:43 PM     Finalized by (CST): Michael Parks MD on 8/02/2024 at 4:46 PM          CT STROKE BRAIN (NO IV)(CPT=70450)    Result Date: 8/2/2024  CONCLUSION:  1. No acute infarct or hemorrhage. 2. Mild changes of chronic small vessel disease in cerebral white matter. 3. Large vessel intracranial atherosclerosis.  This report was called immediately to patient's nurse Ieva at 2:03 p.m..    Dictated by (CST): Camilo Alex MD on 8/02/2024 at 2:00 PM     Finalized by (CST): Camilo Alex MD on 8/02/2024 at 2:04 PM          CTA H/N  FINDINGS:       CAROTID ARTERIES:   RIGHT COMMON CAROTID: No hemodynamically significant stenosis or dissection.    RIGHT INTERNAL CAROTID: Less than 50 percent narrowing      LEFT COMMON CAROTID: No hemodynamically significant stenosis or dissection.    LEFT INTERNAL CAROTID: less than 50 percent narrowing      VERTEBRAL ARTERIES:   RIGHT: No hemodynamically significant stenosis or dissection.    LEFT: No hemodynamically significant stenosis or dissection.       BASILAR ARTERY: No hemodynamically significant stenosis or dissection.         AORTIC ARCH (Limited): Normal.  No aneurysm or dissection.    MEDIASTINUM/APICES (Limited): Normal.  No mass or adenopathy.       OTHER: The visualized soft tissues of the neck are also unremarkable.       INTRACRANIAL ARTERIES:   ANTERIOR CEREBRALS: No significant stenosis.  No visible aneurysm or vascular malformation.    MIDDLE CEREBRALS: No significant stenosis.  No visible aneurysm or vascular malformation.   POSTERIOR CEREBRALS: No significant stenosis.  No visible aneurysm or vascular malformation.      OTHER FINDINGS:      CONCLUSION: No large artery occlusion     I PERSONALLY REVIEWED THESE IMAGES.     echo  Conclusions:     1. Left ventricle: The cavity size was normal. Wall thickness was normal.      Systolic function was at the lower limits of normal. The estimated      ejection fraction was 50%, by biplane method of disks. Hypokinesis of the      apicalinferior wall. Doppler parameters are consistent with abnormal left      ventricular relaxation - grade 1 diastolic dysfunction.   2. Left atrium: The atrium was mildly enlarged.   3. Mitral valve: There was mild regurgitation.   4. Tricuspid valve: There was mild regurgitation.   5. Pulmonary arteries: Systolic pressure was mildly increased, estimated to      be 41mm Hg.     ASSESSMENT:  The patient is a 84 year old  man with past medical history of coronary artery disease, hypertension, hyperlipidemia, chronic kidney disease who  presented with recurrent falls in the setting of orthostatic hypotension.  Neurology is consulted for speech changes noted >24 hours prior to admission; stroke alert called for left facial droop noted 8/2/2024.  Due to last known normal being greater than 24 hours, he was not a candidate for thrombolysis or thrombectomy.  - and hemoglobin A1c 5.9      His neurological examination is significant for left facial droop on low-effort smile that becomes symmetric with increased effort; disorientation to year; circumferential speech; impaired short-term memory issues.      TIA left facial droop - could be baseline finding for him, however - likely lacunar from hyperlipidemia   Encephalopathy disorientation, circumferential speech, anterograde amnesia, suspect underlying cognitive impairment (MRI with temporal lobe atrophy) but this does not explain acute change, which could be related to metabolic or epileptic etiologies; less likely MRI-negative stroke  -Continue  Aspirin 81 mg daily and Plavix 75 mg daily for 21 days, then Plavix monotherapy   -Continue Zetia (cannot tolerate statins) - agree with consideration of alternative agent due to LDL elevation   -PT, OT and speech therapy   -Blood pressure goals: Normotension without >25% drop per day    -Outpatient 30-day cardiac monitoring   -Routine EEG  -Metabolic evaluation     STROKE RISK FACTORS:   *Hypertension - Target blood pressure <140/90, <130/85 for high risk, normal 120/80  *Physical inactivity - target exercise at least 3 times per week  *Obesity - target ideal body weight and girth <35\" for women, <40\" for men  *Diabetes - Target <6.5-7%   *Smoking - Target smoking cessation  *Hyperlipidemia - Target total cholesterol < 200, Target LDL <100, < 70 for high risk, Target HDL >45 for men, >55 for women, Target triglycerides <150        This note was prepared using Dragon Medical voice recognition dictation software and as a result, errors may occur. When  identified, these errors have been corrected. While every attempt is made to correct errors during dictation, discrepancies may still exist    KAYLA Byrnes DO   Staff Neurologist   8/3/2024  1:35 PM

## 2024-08-03 NOTE — PLAN OF CARE
Problem: Patient Centered Care  Goal: Patient preferences are identified and integrated in the patient's plan of care  Description: Interventions:  - What would you like us to know as we care for you?   - Provide timely, complete, and accurate information to patient/family  - Incorporate patient and family knowledge, values, beliefs, and cultural backgrounds into the planning and delivery of care  - Encourage patient/family to participate in care and decision-making at the level they choose  - Honor patient and family perspectives and choices  Outcome: Progressing        Problem: CARDIOVASCULAR - ADULT  Goal: Maintains optimal cardiac output and hemodynamic stability  Description: INTERVENTIONS:  - Monitor vital signs, rhythm, and trends  - Monitor for bleeding, hypotension and signs of decreased cardiac output  - Evaluate effectiveness of vasoactive medications to optimize hemodynamic stability  - Monitor arterial and/or venous puncture sites for bleeding and/or hematoma  - Assess quality of pulses, skin color and temperature  - Assess for signs of decreased coronary artery perfusion - ex. Angina  - Evaluate fluid balance, assess for edema, trend weights  Outcome: Progressing  Goal: Absence of cardiac arrhythmias or at baseline  Description: INTERVENTIONS:  - Continuous cardiac monitoring, monitor vital signs, obtain 12 lead EKG if indicated  - Evaluate effectiveness of antiarrhythmic and heart rate control medications as ordered  - Initiate emergency measures for life threatening arrhythmias  - Monitor electrolytes and administer replacement therapy as ordered  Outcome: Progressing     Problem: SAFETY ADULT - FALL  Goal: Free from fall injury  Description: INTERVENTIONS:  - Assess pt frequently for physical needs  - Identify cognitive and physical deficits and behaviors that affect risk of falls.  - [place] fall precautions as indicated by assessment.  - Educate pt/family on patient safety including physical  limitations  - Instruct pt to call for assistance with activity based on assessment  - Modify environment to reduce risk of injury  - Provide assistive devices as appropriate  - Consider OT/PT consult to assist with strengthening/mobility  - Encourage toileting schedule  Outcome: Progressing     Problem: NEUROLOGICAL - ADULT  Goal: Achieves stable or improved neurological status  Description: INTERVENTIONS  - Assess for and report changes in neurological status  - Initiate measures to prevent increased intracranial pressure  - Maintain blood pressure and fluid volume within ordered parameters to optimize cerebral perfusion and minimize risk of hemorrhage  - Monitor temperature, glucose, and sodium. Initiate appropriate interventions as ordered  Outcome: Progressing

## 2024-08-03 NOTE — PROGRESS NOTES
Cardiology Progress Note  [facility]    David Silvestre Patient Status:  Observation    2/15/1940 MRN [id number]   Location [facility] 3W/SW Attending Rina Ramsey,    Hosp Day # 0 PCP Rome Morgan MD     Assessment/Plan:    IMPRESSIONS:  Syncope and Presyncope, suspect r/t dehydration  CAD s/p CABG 5v   s/p JACOB  SVG RCA  JOHANA on CKD  HL, can only tolerate 1-2 days per week on statin  HTN, has had symptomatic low BP in the past, recent med changes, unclear what he is taking           PLAN:  - EKG, echo with no acute changes   - Renal function improved with IVF  - Alternate statin/PCSK9i to be determined as outpatient  - Unclear what he is taking at home, nifedipine and lopressor on his list. Will switch Lopressor to Carvedilol for BP control, hold nifedipine. monitor rhythm on Telemetry  - ideally would simplify meds as much as possible. Possible he has taken medications inappropriately with his daughter being out of town.   -plan for 14 day zio as outpatient         Subjective  Still feels like his memory was acutely affected, concerned that he is still not thinking clearly. Denies shortness of breath, lightheadedness or chest pain.    History:  Past Medical History:    BPH (benign prostatic hyperplasia)    CKD (chronic kidney disease)    Cold hands and feet    Coronary atherosclerosis of unspecified type of vessel, native or graft    Drug allergy    Hypertension    Hypervitaminosis D    Inguinal hernia without mention of obstruction or gangrene, unilateral or unspecified, (not specified as recurrent)    Mixed hyperlipidemia    Penicillin allergy    S/P coronary artery stent placement    Statin intolerance    Unspecified visual disturbance    Unspecified vitamin D deficiency    Vision abnormalities     Past Surgical History:   Procedure Laterality Date    Bypass surgery  2014    CABG x 5 LIMA-LAD, SVG- 2nd diagonal, 1st OM, Posterior descending & RCA    Cabg      5 vessel bypass     Colonoscopy  2/3/12    small adenoma. Repeat 2017.    Colonoscopy N/A 4/20/2018    adenoma- repeat 5 yrs    Other surgical history  11-29-11    [facility]/us Dr. Gibson    Other surgical history  1-31-12    [facility]/us-Dr. Gibson    Other surgical history  4/12    HERNIA SX @ [facility]    Other surgical history  11/15/13    [facility]sto-Dr. Gibson     Family History   Problem Relation Age of Onset    Heart Disorder Father         mi      reports that he has never smoked. He has never used smokeless tobacco. He reports that he does not drink alcohol and does not use drugs.    Allergies:  Allergies   Allergen Reactions    Thorazine [Chlorpromazine] CONFUSION and OTHER (SEE COMMENTS)     Per pt cva symptoms    Crestor [Rosuvastatin] PAIN and MYALGIA     Joint pain    Nexletol [Bempedoic Acid] OTHER (SEE COMMENTS)     Reportedly knee pain and variable BP fluctuation     Pravachol [Pravastatin] PAIN and MYALGIA     Joint pain     Repatha [Evolocumab] DIARRHEA and MYALGIA          Zocor [Simvastatin] PAIN and MYALGIA     joint    Imdur [Isosorbide] OTHER (SEE COMMENTS)     Head ache    Praluent [Alirocumab] DIARRHEA    Statins OTHER (SEE COMMENTS)     Per patient after 2-4 days the medication affects knees and patient is unable to walk.    Sulfa Antibiotics RASH       Medications:  No current facility-administered medications on file prior to encounter.     Current Outpatient Medications on File Prior to Encounter   Medication Sig Dispense Refill    NIFEdipine ER 30 MG Oral Tablet 24 Hr Take 1 tablet (30 mg total) by mouth daily. Takes in afternoon      Clopidogrel Bisulfate 75 MG Oral Tab Take 1 tablet (75 mg total) by mouth daily. 90 tablet 3    ezetimibe 10 MG Oral Tab Take 1 tablet (10 mg total) by mouth nightly. 90 tablet 3    Metoprolol Succinate ER 25 MG Oral Tablet 24 Hr Take 1 tablet (25 mg total) by mouth daily. 90 tablet 3    aspirin 81 MG Oral Tab EC Take 1 tablet (81 mg total) by mouth daily.  0    LORazepam 1 MG Oral Tab Take 0.5  tablets (0.5 mg total) by mouth as needed. (Patient taking differently: Take 0.25 mg by mouth as needed for Anxiety. For SBP >180) 30 tablet 0    nitroGLYCERIN 0.4 MG Sublingual SL Tab Place 1 tablet (0.4 mg total) under the tongue as needed for Chest pain. 90 tablet 3    Saw Palmetto 450 MG Oral Cap Take by mouth daily.      Multiple Vitamin (ONE-DAILY MULTI VITAMINS) Oral Tab Take 1 tablet by mouth daily.      Magnesium 500 MG Oral Tab Take 1 tablet (500 mg total) by mouth daily.      COENZYME Q-10 OR Take by mouth. daily      Vitamin C (VITAMIN C) 500 MG Oral Tab Take 1 tablet (500 mg total) by mouth daily.      FISH OIL 1000 MG OR CAPS 2-3 CAPSULES DAILY         Review of Systems:  Constitutional: denies fevers, chills, night sweats  HEENT: denies headache, vision changes, trouble or pain with swallowing  Cardiac: denies chest pain, palpitations, edema  Pulm: denies dyspnea, cough, wheeze  GI: denies n/v, abd pain, diarrhea or constipation  : denies hematuria, dysuria, incontinence  MSK: denies muscle or joint pains  Neuro: denies numbness, weakness, paresthesias  Psych: denies anxiety, depression  Integument: denies skin rashes or lesions  Heme: denies easy bruising or bleeding  Endo: denies heat/cold intolerance, skin or nail changes      Physical Exam:  Blood pressure 128/62, pulse 56, temperature 98 °F (36.7 °C), temperature source Oral, resp. rate 18, weight 173 lb 14.4 oz (78.9 kg), SpO2 98%.  Wt Readings from Last 3 Encounters:   08/01/24 173 lb 14.4 oz (78.9 kg)   01/22/24 160 lb (72.6 kg)   03/16/22 162 lb 11.2 oz (73.8 kg)       General: awake, alert, oriented x 3, no acute distress  HEENT: at/nc, perrl, eomi  Neck: No JVD, carotids 2+ no bruits.  Cardiac: Regular rate and rhythm, S1, S2 normal, no murmur, rub or gallop.  Lungs: Clear without wheezes, rales, rhonchi or dullness.  Normal excursions and effort.  Abdomen: Soft, non-tender, non-distended, normal bowel sounds   Extremities: Without  clubbing, cyanosis or edema.  Peripheral pulses are 2+.  Neurologic: Alert and oriented, normal affect.  Psych: normal mood and affect  Skin: Warm and dry.       Laboratories and Data:  Diagnostics:  Tele: SR    Echo:   Conclusions:     1. Left ventricle: The cavity size was normal. Wall thickness was normal.      Systolic function was at the lower limits of normal. The estimated      ejection fraction was 50%, by biplane method of disks. Hypokinesis of the      apicalinferior wall. Doppler parameters are consistent with abnormal left      ventricular relaxation - grade 1 diastolic dysfunction.   2. Left atrium: The atrium was mildly enlarged.   3. Mitral valve: There was mild regurgitation.   4. Tricuspid valve: There was mild regurgitation.   5. Pulmonary arteries: Systolic pressure was mildly increased, estimated to      be 41mm Hg.         CATH 3/23:  · Severe native multivessel disease. Patent LIMA-LAD, SVG-D1, and SVG-OM1.   There is a severe stenosis in the proximal SVG-RCA. The sequential portion   of the graft to the RPDA is occluded. The limb to the RPL is patent.   · Successful PCI of the proximal SVG-RCA with 3.5x30mm [place] JACOB x 1.     1) ASA 81mg and clopidogrel 75mg daily.   2) Post procedural IVF in the setting of CKD   3) Cardiology inpatient service to follow.     Coronary Findings Diagnostic Dominance: Right   Left Main: Ost LM lesion with 30% stenosis.   Left Anterior Descending: Prox LAD to Mid LAD lesion with 95% stenosis. Partially in stent. First Diagonal Branch: 1st Diag lesion with 100% stenosis. The vessel is total chronically occluded.   Left Circumflex: Patent stents from the mid LCX into OM2. First Obtuse Marginal Branch: 1st Mrg lesion with 100% stenosis. The vessel is total chronically occluded.   Right Coronary Artery: Mid RCA lesion with 100% stenosis. The vessel is total chronically occluded.   Graft To 1st Mrg: The graft is free of disease.   Graft To Dist RCA: There is occlusion  of the Y-portion of the graft to the RPDA. The portion to the RPL is patent. The proximal main graft has a severe stenosis. Prox Graft lesion with 80% stenosis.   Graft To 1st Diag: The graft exhibits minimal (20%) luminal irregularities.   LIMA Graft To Mid LAD: The graft is free of disease.     Intervention   Prox Graft lesion (Graft To Dist RCA): Stent: Post-Intervention Lesion Assessment: Pre-intervention KIZZY flow: 3. Post-intervention KIZZY flow is 3. There is a 0% residual stenosis post intervention.     Labs:   CBC:    Lab Results   Component Value Date    WBC 3.0 (L) 08/02/2024    WBC 3.4 (L) 08/02/2024    WBC 4.7 08/01/2024     Lab Results   Component Value Date    HEMOGLOBIN 13.9 07/06/2012    HEMOGLOBIN 14.0 05/09/2012    HEMOGLOBIN 14.4 04/06/2012    HGB 12.1 (L) 08/02/2024    HGB 11.5 (L) 08/02/2024    HGB 12.4 (L) 08/01/2024      Lab Results   Component Value Date    .0 08/02/2024    .0 08/02/2024    .0 08/01/2024     BMP:     Lab Results   Component Value Date    GLUCOSE 88 06/12/2014    GLUCOSE 90 04/03/2013    GLUCOSE 86 04/06/2012     Lab Results   Component Value Date    K 4.5 08/02/2024    K 4.3 08/02/2024    K 4.2 08/01/2024     Lab Results   Component Value Date    BUN 20 08/02/2024    BUN 21 08/02/2024    BUN 26 (H) 08/01/2024     Lab Results   Component Value Date    CREATSERUM 1.42 (H) 08/02/2024    CREATSERUM 1.64 (H) 08/02/2024    CREATSERUM 2.01 (H) 08/01/2024     Cholesterol:     Lab Results   Component Value Date    CHOLEST 168.00 03/09/2021    CHOLEST 145.00 10/22/2020    CHOLEST 176.00 06/27/2019     Lab Results   Component Value Date    HDL 50 03/09/2021    HDL 43 10/22/2020    HDL 35 (L) 06/27/2019     Lab Results   Component Value Date    TRIG 123.00 03/09/2021    TRIG 84.00 10/22/2020    TRIG 157.00 (H) 06/27/2019    TRIGLY 103 06/12/2014    TRIGLY 111 04/03/2013    TRIGLY 131 10/31/2012     Lab Results   Component Value Date    LDL 93 03/09/2021    LDL 85  10/22/2020     06/27/2019     Lab Results   Component Value Date    AST 17 08/02/2024    AST 14 03/09/2021    AST 16 03/02/2020     Lab Results   Component Value Date    ALT 13 08/02/2024    ALT 11 03/09/2021    ALT 9 03/02/2020         Attending addendum:  I have seen and examined patient, discussed with NP.  Agree with assessment and plan as outlined above.  Neuro workup ongoing, MRI brain negative for acute process.  Cont asa, plavix, statin, bb; titrate carvedilol cautiously as needed for BP control while monitoring HR.  Daily orthostatics, will likely need to tolerate some amount of supine HTN to avoid orthostatic hypotension.  Echo with low-normal LVEF.  PT/OT.  Remainder as per NP note.    General: awake, alert, oriented x 3, no acute distress  HEENT: at/nc, perrl, eomi  Neck: No JVD, carotids 2+ no bruits.  Cardiac: Regular rate and rhythm, S1, S2 normal, holosystolic murmur heard throughout precordium  Lungs: Clear without wheezes, rales, rhonchi or dullness.  Normal excursions and effort.  Abdomen: Soft, non-tender, non-distended, normal bowel sounds   Extremities: Without clubbing, cyanosis or edema.  Peripheral pulses are 2+.  Neurologic: Alert and oriented, normal affect.  Psych: normal mood and affect  Skin: Warm and dry.

## 2024-08-03 NOTE — PLAN OF CARE
Plan for EEG, no new c/o at this time.     Problem: Patient Centered Care  Goal: Patient preferences are identified and integrated in the patient's plan of care  Description: Interventions:  - Provide timely, complete, and accurate information to patient/family  - Incorporate patient and family knowledge, values, beliefs, and cultural backgrounds into the planning and delivery of care  - Encourage patient/family to participate in care and decision-making at the level they choose  - Honor patient and family perspectives and choices  Outcome: Progressing     Problem: CARDIOVASCULAR - ADULT  Goal: Maintains optimal cardiac output and hemodynamic stability  Description: INTERVENTIONS:  - Monitor vital signs, rhythm, and trends  - Monitor for bleeding, hypotension and signs of decreased cardiac output  - Evaluate effectiveness of vasoactive medications to optimize hemodynamic stability  - Monitor arterial and/or venous puncture sites for bleeding and/or hematoma  - Assess quality of pulses, skin color and temperature  - Assess for signs of decreased coronary artery perfusion - ex. Angina  - Evaluate fluid balance, assess for edema, trend weights  Outcome: Progressing  Goal: Absence of cardiac arrhythmias or at baseline  Description: INTERVENTIONS:  - Continuous cardiac monitoring, monitor vital signs, obtain 12 lead EKG if indicated  - Evaluate effectiveness of antiarrhythmic and heart rate control medications as ordered  - Initiate emergency measures for life threatening arrhythmias  - Monitor electrolytes and administer replacement therapy as ordered  Outcome: Progressing     Problem: SAFETY ADULT - FALL  Goal: Free from fall injury  Description: INTERVENTIONS:  - Assess pt frequently for physical needs  - Identify cognitive and physical deficits and behaviors that affect risk of falls.  - [place] fall precautions as indicated by assessment.  - Educate pt/family on patient safety including physical limitations  -  Instruct pt to call for assistance with activity based on assessment  - Modify environment to reduce risk of injury  - Provide assistive devices as appropriate  - Consider OT/PT consult to assist with strengthening/mobility  - Encourage toileting schedule  Outcome: Progressing     Problem: NEUROLOGICAL - ADULT  Goal: Achieves stable or improved neurological status  Description: INTERVENTIONS  - Assess for and report changes in neurological status  - Initiate measures to prevent increased intracranial pressure  - Maintain blood pressure and fluid volume within ordered parameters to optimize cerebral perfusion and minimize risk of hemorrhage  - Monitor temperature, glucose, and sodium. Initiate appropriate interventions as ordered  Outcome: Progressing

## 2024-08-04 LAB
GLUCOSE BLDC GLUCOMTR-MCNC: 130 MG/DL (ref 70–99)
GLUCOSE BLDC GLUCOMTR-MCNC: 85 MG/DL (ref 70–99)
GLUCOSE BLDC GLUCOMTR-MCNC: 92 MG/DL (ref 70–99)
GLUCOSE BLDC GLUCOMTR-MCNC: 97 MG/DL (ref 70–99)

## 2024-08-04 RX ORDER — CARVEDILOL 6.25 MG/1
6.25 TABLET ORAL 2 TIMES DAILY WITH MEALS
Status: DISCONTINUED | OUTPATIENT
Start: 2024-08-04 | End: 2024-08-09

## 2024-08-04 NOTE — PROGRESS NOTES
Cardiology Progress Note  [facility]    David Silvestre Patient Status:  Observation    2/15/1940 MRN [id number]   Location [facility] 3W/SW Attending Rina Ramsey, DO   Hosp Day # 0 PCP Rome Morgan MD     Assessment/Plan:    IMPRESSIONS:  Syncope and Presyncope, suspect r/t dehydration  - neuro workup ongoing, MRI brain negative for acute process.   - EKG, echo with no acute changes   CAD s/p CABG 5v   s/p JACOB  SVG RCA  JOHANA on CKD  HL, can only tolerate 1-2 days per week on statin  HTN, has had symptomatic low BP in the past, recent med changes, he is unsure of what he is taking          PLAN:  - orthostatics positive, will reduce coreg and will give 500cc bolus, encouraged oral hydration  - Alternate statin/PCSK9i to be determined as outpatient  - Unclear what antihypertensives he is taking at home, nifedipine and lopressor on his list, but he does not think he is on these. Blood pressures reasonably well controlled with carvedilol  - monitor rhythm on Telemetry  - ideally would simplify meds as much as possible. Possible he has taken medications inappropriately with his daughter being out of town.   -plan for 14 day zio as outpatient         Subjective  Denies shortness of breath, mildly lightheaded upon standing    History:  Past Medical History:    BPH (benign prostatic hyperplasia)    CKD (chronic kidney disease)    Cold hands and feet    Coronary atherosclerosis of unspecified type of vessel, native or graft    Drug allergy    Hypertension    Hypervitaminosis D    Inguinal hernia without mention of obstruction or gangrene, unilateral or unspecified, (not specified as recurrent)    Mixed hyperlipidemia    Penicillin allergy    S/P coronary artery stent placement    Statin intolerance    Unspecified visual disturbance    Unspecified vitamin D deficiency    Vision abnormalities     Past Surgical History:   Procedure Laterality Date    Bypass surgery  2014    CABG x 5 LIMA-LAD,  SVG- 2nd diagonal, 1st OM, Posterior descending & RCA    Cabg      5 vessel bypass    Colonoscopy  2/3/12    small adenoma. Repeat 2017.    Colonoscopy N/A 4/20/2018    adenoma- repeat 5 yrs    Other surgical history  11-29-11    [facility]/us Dr. Gibson    Other surgical history  1-31-12    [facility]/us-Dr. Gibson    Other surgical history  4/12    HERNIA SX @ [facility]    Other surgical history  11/15/13    cysto-Dr. Gibson     Family History   Problem Relation Age of Onset    Heart Disorder Father         mi      reports that he has never smoked. He has never used smokeless tobacco. He reports that he does not drink alcohol and does not use drugs.    Allergies:  Allergies   Allergen Reactions    Thorazine [Chlorpromazine] CONFUSION and OTHER (SEE COMMENTS)     Per pt cva symptoms    Crestor [Rosuvastatin] PAIN and MYALGIA     Joint pain    Nexletol [Bempedoic Acid] OTHER (SEE COMMENTS)     Reportedly knee pain and variable BP fluctuation     Pravachol [Pravastatin] PAIN and MYALGIA     Joint pain     Repatha [Evolocumab] DIARRHEA and MYALGIA          Zocor [Simvastatin] PAIN and MYALGIA     joint    Imdur [Isosorbide] OTHER (SEE COMMENTS)     Head ache    Praluent [Alirocumab] DIARRHEA    Statins OTHER (SEE COMMENTS)     Per patient after 2-4 days the medication affects knees and patient is unable to walk.    Sulfa Antibiotics RASH       Medications:  No current facility-administered medications on file prior to encounter.     Current Outpatient Medications on File Prior to Encounter   Medication Sig Dispense Refill    NIFEdipine ER 30 MG Oral Tablet 24 Hr Take 1 tablet (30 mg total) by mouth daily. Takes in afternoon      Clopidogrel Bisulfate 75 MG Oral Tab Take 1 tablet (75 mg total) by mouth daily. 90 tablet 3    ezetimibe 10 MG Oral Tab Take 1 tablet (10 mg total) by mouth nightly. 90 tablet 3    Metoprolol Succinate ER 25 MG Oral Tablet 24 Hr Take 1 tablet (25 mg total) by mouth daily. 90 tablet 3    aspirin 81 MG Oral Tab EC  Take 1 tablet (81 mg total) by mouth daily.  0    LORazepam 1 MG Oral Tab Take 0.5 tablets (0.5 mg total) by mouth as needed. (Patient taking differently: Take 0.25 mg by mouth as needed for Anxiety. For SBP >180) 30 tablet 0    nitroGLYCERIN 0.4 MG Sublingual SL Tab Place 1 tablet (0.4 mg total) under the tongue as needed for Chest pain. 90 tablet 3    Saw Palmetto 450 MG Oral Cap Take by mouth daily.      Multiple Vitamin (ONE-DAILY MULTI VITAMINS) Oral Tab Take 1 tablet by mouth daily.      Magnesium 500 MG Oral Tab Take 1 tablet (500 mg total) by mouth daily.      COENZYME Q-10 OR Take by mouth. daily      Vitamin C (VITAMIN C) 500 MG Oral Tab Take 1 tablet (500 mg total) by mouth daily.      FISH OIL 1000 MG OR CAPS 2-3 CAPSULES DAILY         Review of Systems:  Constitutional: denies fevers, chills, night sweats  HEENT: denies headache, vision changes, trouble or pain with swallowing  Cardiac: denies chest pain, palpitations, edema  Pulm: denies dyspnea, cough, wheeze  GI: denies n/v, abd pain, diarrhea or constipation  : denies hematuria, dysuria, incontinence  MSK: denies muscle or joint pains  Neuro: denies numbness, weakness, paresthesias  Psych: denies anxiety, depression  Integument: denies skin rashes or lesions  Heme: denies easy bruising or bleeding  Endo: denies heat/cold intolerance, skin or nail changes      Physical Exam:  Blood pressure 157/78, pulse 53, temperature 98.3 °F (36.8 °C), temperature source Oral, resp. rate 16, weight 174 lb 8 oz (79.2 kg), SpO2 94%.  Wt Readings from Last 3 Encounters:   08/04/24 174 lb 8 oz (79.2 kg)   01/22/24 160 lb (72.6 kg)   03/16/22 162 lb 11.2 oz (73.8 kg)       General: awake, alert, oriented x 3, no acute distress  HEENT: at/nc, perrl, eomi  Neck: No JVD, carotids 2+ no bruits.  Cardiac: Regular rate and rhythm, S1, S2 normal, no murmur, rub or gallop.  Lungs: Clear without wheezes, rales, rhonchi or dullness.  Normal excursions and effort.  Abdomen:  Soft, non-tender, non-distended, normal bowel sounds   Extremities: Without clubbing, cyanosis or edema.  Peripheral pulses are 2+.  Neurologic: Alert and oriented, normal affect.  Psych: normal mood and affect  Skin: Warm and dry.       Laboratories and Data:  Diagnostics:  Tele: SR    Echo:   Conclusions:     1. Left ventricle: The cavity size was normal. Wall thickness was normal.      Systolic function was at the lower limits of normal. The estimated      ejection fraction was 50%, by biplane method of disks. Hypokinesis of the      apicalinferior wall. Doppler parameters are consistent with abnormal left      ventricular relaxation - grade 1 diastolic dysfunction.   2. Left atrium: The atrium was mildly enlarged.   3. Mitral valve: There was mild regurgitation.   4. Tricuspid valve: There was mild regurgitation.   5. Pulmonary arteries: Systolic pressure was mildly increased, estimated to      be 41mm Hg.         CATH 3/23:  · Severe native multivessel disease. Patent LIMA-LAD, SVG-D1, and SVG-OM1.   There is a severe stenosis in the proximal SVG-RCA. The sequential portion   of the graft to the RPDA is occluded. The limb to the RPL is patent.   · Successful PCI of the proximal SVG-RCA with 3.5x30mm [place] JACOB x 1.     1) ASA 81mg and clopidogrel 75mg daily.   2) Post procedural IVF in the setting of CKD   3) Cardiology inpatient service to follow.     Coronary Findings Diagnostic Dominance: Right   Left Main: Ost LM lesion with 30% stenosis.   Left Anterior Descending: Prox LAD to Mid LAD lesion with 95% stenosis. Partially in stent. First Diagonal Branch: 1st Diag lesion with 100% stenosis. The vessel is total chronically occluded.   Left Circumflex: Patent stents from the mid LCX into OM2. First Obtuse Marginal Branch: 1st Mrg lesion with 100% stenosis. The vessel is total chronically occluded.   Right Coronary Artery: Mid RCA lesion with 100% stenosis. The vessel is total chronically occluded.   Graft To 1st  Mrg: The graft is free of disease.   Graft To Dist RCA: There is occlusion of the Y-portion of the graft to the RPDA. The portion to the RPL is patent. The proximal main graft has a severe stenosis. Prox Graft lesion with 80% stenosis.   Graft To 1st Diag: The graft exhibits minimal (20%) luminal irregularities.   LIMA Graft To Mid LAD: The graft is free of disease.     Intervention   Prox Graft lesion (Graft To Dist RCA): Stent: Post-Intervention Lesion Assessment: Pre-intervention KIZZY flow: 3. Post-intervention KIZZY flow is 3. There is a 0% residual stenosis post intervention.     Labs:   CBC:    Lab Results   Component Value Date    WBC 3.0 (L) 08/02/2024    WBC 3.4 (L) 08/02/2024    WBC 4.7 08/01/2024     Lab Results   Component Value Date    HEMOGLOBIN 13.9 07/06/2012    HEMOGLOBIN 14.0 05/09/2012    HEMOGLOBIN 14.4 04/06/2012    HGB 12.1 (L) 08/02/2024    HGB 11.5 (L) 08/02/2024    HGB 12.4 (L) 08/01/2024      Lab Results   Component Value Date    .0 08/02/2024    .0 08/02/2024    .0 08/01/2024     BMP:     Lab Results   Component Value Date    GLUCOSE 88 06/12/2014    GLUCOSE 90 04/03/2013    GLUCOSE 86 04/06/2012     Lab Results   Component Value Date    K 4.5 08/02/2024    K 4.3 08/02/2024    K 4.2 08/01/2024     Lab Results   Component Value Date    BUN 20 08/02/2024    BUN 21 08/02/2024    BUN 26 (H) 08/01/2024     Lab Results   Component Value Date    CREATSERUM 1.42 (H) 08/02/2024    CREATSERUM 1.64 (H) 08/02/2024    CREATSERUM 2.01 (H) 08/01/2024     Cholesterol:     Lab Results   Component Value Date    CHOLEST 168.00 03/09/2021    CHOLEST 145.00 10/22/2020    CHOLEST 176.00 06/27/2019     Lab Results   Component Value Date    HDL 50 03/09/2021    HDL 43 10/22/2020    HDL 35 (L) 06/27/2019     Lab Results   Component Value Date    TRIG 123.00 03/09/2021    TRIG 84.00 10/22/2020    TRIG 157.00 (H) 06/27/2019    TRIGLY 103 06/12/2014    TRIGLY 111 04/03/2013    TRIGLY 131  10/31/2012     Lab Results   Component Value Date    LDL 93 03/09/2021    LDL 85 10/22/2020     06/27/2019     Lab Results   Component Value Date    AST 17 08/02/2024    AST 14 03/09/2021    AST 16 03/02/2020     Lab Results   Component Value Date    ALT 13 08/02/2024    ALT 11 03/09/2021    ALT 9 03/02/2020       Attending addendum:  I have seen and examined patient, discussed with NP.  Agree with assessment and plan as outlined above.    General: awake, alert, oriented x 3, no acute distress  HEENT: at/nc, perrl, eomi  Neck: No JVD, carotids 2+ no bruits.  Cardiac: Regular rate and rhythm, S1, S2 normal, no murmur, rub or gallop.  Lungs: Clear without wheezes, rales, rhonchi or dullness.  Normal excursions and effort.  Abdomen: Soft, non-tender, non-distended, normal bowel sounds   Extremities: Without clubbing, cyanosis or edema.  Peripheral pulses are 2+.  Neurologic: Alert and oriented, normal affect.  Psych: normal mood and affect  Skin: Warm and dry.

## 2024-08-04 NOTE — PROGRESS NOTES
[facility] Hospitalist Progress Note     CC: Hospital Follow up    PCP: Rome Morgan MD       Assessment/Plan:   This is a 84-year-old male with history of coronary artery disease status post CABG 2014 then PCI in 2023, CKD, hypertension, hyperlipidemia, presents to the hospital for evaluation of multiple falls.      Falls  Syncope  -suspect more likely orthostasis and shifts in BP. However given he stated he felt his speech was not the same we ordered MRI brain. Also stroke alert called 8/2/24 ~130pm   -monitor BP with BP med changes  -ambulate and mobilize  -PT/OT  -appreciate neurology and cards evals  -on dual antiplatelet for now incase he had TIA   -monitor orthostatics at least daily      Coronary artery disease  -cabg 2014, PCI to RCA 2023  -on aspirin   -on zetia, intolerant to statin   -no chest pain      Hypertension  -adjusting accordingly here (coreg dosing reduced per cards today)     JOHANA on CKD  -improved with saline, now off IV fluids (getting a bolus this AM however given orthostasis)     DVT prophylaxis: heparin sub q  Code status: FULL      Updated daughter evening of 8/2. They are out of town but returning this week  Patient feels comfortable going back home.   Still orthostatic today, adjusting coreg. Repeat orthostatics tomorrow. Home maybe by tomorrow   Home care services at discharge    Rina Haley Hospitalist      Subjective:     Feels ok. No new acute complaints.       OBJECTIVE:    Blood pressure 97/61, pulse 58, temperature 98.4 °F (36.9 °C), temperature source Oral, resp. rate 20, weight 174 lb 8 oz (79.2 kg), SpO2 96%.    Temp:  [97.8 °F (36.6 °C)-98.4 °F (36.9 °C)] 98.4 °F (36.9 °C)  Pulse:  [53-60] 58  Resp:  [12-20] 20  BP: ()/(61-90) 97/61  SpO2:  [94 %-97 %] 96 %      Intake/Output:    Intake/Output Summary (Last 24 hours) at 8/4/2024 1305  Last data filed at 8/4/2024 0905  Gross per 24 hour   Intake 110 ml   Output 1900 ml   Net -1790 ml       Last 3  Weights   08/04/24 0550 174 lb 8 oz (79.2 kg)   08/01/24 2353 173 lb 14.4 oz (78.9 kg)   01/22/24 1247 160 lb (72.6 kg)   03/16/22 0806 162 lb 11.2 oz (73.8 kg)       BP 97/61 (BP Location: Right arm)   Pulse 58   Temp 98.4 °F (36.9 °C) (Oral)   Resp 20   Wt 174 lb 8 oz (79.2 kg)   SpO2 96%   BMI 23.67 kg/m²   General: Alert, no acute distress  Lungs: clear to ausculation bilaterally  Heart: Regular rate and rhythm  Abdomen: soft, non tender, non distended   Extremities: No edema    Data Review:       Labs:     Recent Labs   Lab 08/01/24 1920 08/02/24  0631 08/02/24  1454   RBC 3.98 3.77* 3.94   HGB 12.4* 11.5* 12.1*   HCT 36.6* 35.1* 36.8*   MCV 92.0 93.1 93.4   MCH 31.2 30.5 30.7   MCHC 33.9 32.8 32.9   RDW 14.0 14.3 14.2   NEPRELIM 3.08  --  1.86   WBC 4.7 3.4* 3.0*   .0 157.0 161.0         Recent Labs   Lab 08/01/24 1920 08/02/24  0631 08/02/24  1454   * 94 86   BUN 26* 21 20   CREATSERUM 2.01* 1.64* 1.42*   EGFRCR 32* 41* 49*   CA 8.8 9.0 9.8    141 140   K 4.2 4.3 4.5    111 110   CO2 22.0 24.0 25.0       Recent Labs   Lab 08/02/24  1454   ALT 13   AST 17   ALB 4.2         Imaging:  MRI BRAIN (CPT=70551)    Result Date: 8/2/2024  CONCLUSION: No acute intracranial process.  No evidence of acute or subacute infarct. There are mild microvascular white matter ischemic changes, likely related to long-standing hypertension and/or diabetes.   Dictated by (CST): Michael Parks MD on 8/02/2024 at 4:43 PM     Finalized by (CST): Michael Parks MD on 8/02/2024 at 4:46 PM          CT STROKE BRAIN (NO IV)(CPT=70450)    Result Date: 8/2/2024  CONCLUSION:  1. No acute infarct or hemorrhage. 2. Mild changes of chronic small vessel disease in cerebral white matter. 3. Large vessel intracranial atherosclerosis.  This report was called immediately to patient's nurse Ieva at 2:03 p.m..    Dictated by (CST): Camilo Alex MD on 8/02/2024 at 2:00 PM     Finalized by (CST): Camilo Alex MD on  8/02/2024 at 2:04 PM          XR CHEST AP PORTABLE  (CPT=71045)    Result Date: 8/1/2024  CONCLUSION: No acute cardiopulmonary abnormality.    Dictated by (CST): Michael Parks MD on 8/01/2024 at 7:56 PM     Finalized by (CST): Michael Parks MD on 8/01/2024 at 7:57 PM             Meds:      carvedilol  6.25 mg Oral BID with meals    sodium chloride  500 mL Intravenous Once    clopidogrel  75 mg Oral Daily    aspirin  81 mg Oral Daily    ezetimibe  10 mg Oral Nightly    pantoprazole  40 mg Oral BID    heparin  5,000 Units Subcutaneous Q8H JUSTICE         acetaminophen **OR** acetaminophen    ondansetron    metoclopramide    ibuprofen

## 2024-08-04 NOTE — SLP NOTE
ADULT SWALLOWING & COGNITIVE-COMMUNICATION EVALUATION    ASSESSMENT    ASSESSMENT/OVERALL IMPRESSION:    Orders received, chart reviewed, clinical bedside swallow evaluation and cognitive-communication evaluation complete per stroke protocol and given patient symptoms. CXR 8/1/24, CTH 8/2/24 and MRI brain imaging 8/2/24 reviewed as below. No hx SLP service per this EMR review. No family at bedside. RN authorizes visit, patient passed RN swallow screen 8/2/24 and cardiac diet initiated, RN endorses tolerance for current diet without acute swallow safety concerns. Patient denies acute/remote hx dysphagia, endorses recent memory and word finding challenges leading up to this admission and persisting now. Patient reports PLOF as independent in home environment with support for IADLS PRN  from patient's daughter and her family who live in patient's home.     Patient upright in chair at bedside, afebrile, in NAD, oriented x3-4, visual challenges appreciated as patient reports high sensitivity to light and difficulty visualizing some testing objectives. Oral motor mechanism examination unremarkable. Speech intelligibility 100% in conversation. Expressive and receptive language appear intact to support conversation without overt word finding or comprehension deficits however more notable deficits during objective assessment. Patient achieved MOCA version 1 score of 20/30 (WNL 26/30) with details noted below. Patient self-administered bites of dry solid crackers and cup/straw sips thin liquids with oropharyngeal timing and control which appears adequate/functional and without overt signs aspiration/distress.    Patient presents without overt evidence oropharyngeal dysphagia at bedside, appears appropriate to continue current PO diet with standard aspiration precautions. Patient presents with at least mild cognitive-communication impairment per MOCA version 1 score of 20/30 with deficits noted in executive functions including  visuospatial skills, thought organization, reasoning, verbal fluency and memory. Given patient high PLOF and motivation for improvement, recommend include SLP service with home health care vs consideration for outpatient therapy as available. No further acute SLP service indicated at this time, will sign off. Plan and recommendations reviewed with patient and RN at bedside.      RECOMMENDATIONS   Diet Recommendations - Solids: Regular  Diet Recommendations - Liquids: Thin Liquids  Compensatory Strategies Recommended: Slow rate;Small bites and sips;Alternate consistencies  Aspiration Precautions: Upright position;Slow rate;Small bites and sips  Medication Administration Recommendations: One pill at a time  Treatment Plan/Recommendations: No further inpatient SLP service warranted    HISTORY   MEDICAL HISTORY  Reason for Referral: Stroke protocol    Problem List  Principal Problem:    Syncope, near  Active Problems:    Dehydration    Memory deficits    Orthostatic intolerance    Recurrent falls    TIA (transient ischemic attack)    Toxic metabolic encephalopathy    Cerebral atrophy (HCC)      Past Medical History  Past Medical History:    BPH (benign prostatic hyperplasia)    CKD (chronic kidney disease)    Cold hands and feet    Coronary atherosclerosis of unspecified type of vessel, native or graft    Drug allergy    Hypertension    Hypervitaminosis D    Inguinal hernia without mention of obstruction or gangrene, unilateral or unspecified, (not specified as recurrent)    Mixed hyperlipidemia    Penicillin allergy    S/P coronary artery stent placement    Statin intolerance    Unspecified visual disturbance    Unspecified vitamin D deficiency    Vision abnormalities       Prior Living Situation: Home with support  Diet Prior to Admission: Regular;Thin liquids  Precautions: Aspiration    Patient/Family Goals: Return home, improve memory    SWALLOWING HISTORY  Current Diet Consistency: Regular;Thin liquids  Dysphagia  History: As above    Imaging Results:     MRI brain 24:  CONCLUSION: No acute intracranial process.  No evidence of acute or subacute infarct. There are mild microvascular white matter ischemic changes, likely related to long-standing hypertension and/or diabetes.     CTH 24:  CONCLUSION:   1. No acute infarct or hemorrhage.   2. Mild changes of chronic small vessel disease in cerebral white matter.   3. Large vessel intracranial atherosclerosis.     CXR 24:  CONCLUSION: No acute cardiopulmonary abnormality.       OBJECTIVE     Dentition: Functional  Symmetry: Within Functional Limits  Strength: Within Functional Limits  Tone: Within Functional Limits  Range of Motion: Within Functional Limits  Rate of Motion: Within Functional Limits    Voice Quality: Clear  Respiratory Status: Unlabored  Consistencies Trialed: Thin liquids;Hard solid  Method of Presentation: Self presentation  Patient Positioning: Upright;Midline    Oral Phase of Swallow: Within Functional Limits    Pharyngeal Phase of Swallow: Within Functional Limits  (Please note: Silent aspiration cannot be evaluated clinically. Videofluoroscopic Swallow Study is required to rule-out silent aspiration.)    Esophageal Phase of Swallow: No complaints consistent with possible esophageal involvement      Administered the [place] Cognitive Assessment (MoCA) with the following results obtained:    Executive Function/Visuospatial: 2/5  Trail makin/1  Design copy: 0/1  Clock drawin/3     Naming: 3/3     Memory: 2/5  Immediate recall: 3/5  Delayed recall: 2/5     Attention: 6/6  Working memory for numerical manipulation: 2/2  Alpha ID: 1  Serial 7s: 3/3     Language: 1/3  Sentence repetition: 1/2  Verbal fluency: 0/2     Abstraction: 1/2     Orientation: 5/6     TOTAL: 20 (WNL )      FOLLOW UP  Treatment Plan/Recommendations: No further inpatient SLP service warranted     Follow Up Needed (Documentation Required): No       Thank you for your  referral.   If you have any questions, please contact Cristina Phan, SLP  Cristina Phan M.S. CCC-SLP  Speech-Language Pathologist  [phone]

## 2024-08-04 NOTE — PLAN OF CARE
Problem: Patient Centered Care  Goal: Patient preferences are identified and integrated in the patient's plan of care  Description: Interventions:  - What would you like us to know as we care for you?   - Provide timely, complete, and accurate information to patient/family  - Incorporate patient and family knowledge, values, beliefs, and cultural backgrounds into the planning and delivery of care  - Encourage patient/family to participate in care and decision-making at the level they choose  - Honor patient and family perspectives and choices  Outcome: Progressing       Problem: CARDIOVASCULAR - ADULT  Goal: Maintains optimal cardiac output and hemodynamic stability  Description: INTERVENTIONS:  - Monitor vital signs, rhythm, and trends  - Monitor for bleeding, hypotension and signs of decreased cardiac output  - Evaluate effectiveness of vasoactive medications to optimize hemodynamic stability  - Monitor arterial and/or venous puncture sites for bleeding and/or hematoma  - Assess quality of pulses, skin color and temperature  - Assess for signs of decreased coronary artery perfusion - ex. Angina  - Evaluate fluid balance, assess for edema, trend weights  Outcome: Progressing  Goal: Absence of cardiac arrhythmias or at baseline  Description: INTERVENTIONS:  - Continuous cardiac monitoring, monitor vital signs, obtain 12 lead EKG if indicated  - Evaluate effectiveness of antiarrhythmic and heart rate control medications as ordered  - Initiate emergency measures for life threatening arrhythmias  - Monitor electrolytes and administer replacement therapy as ordered  Outcome: Progressing     Problem: SAFETY ADULT - FALL  Goal: Free from fall injury  Description: INTERVENTIONS:  - Assess pt frequently for physical needs  - Identify cognitive and physical deficits and behaviors that affect risk of falls.  - [place] fall precautions as indicated by assessment.  - Educate pt/family on patient safety including physical  limitations  - Instruct pt to call for assistance with activity based on assessment  - Modify environment to reduce risk of injury  - Provide assistive devices as appropriate  - Consider OT/PT consult to assist with strengthening/mobility  - Encourage toileting schedule  Outcome: Progressing     Problem: NEUROLOGICAL - ADULT  Goal: Achieves stable or improved neurological status  Description: INTERVENTIONS  - Assess for and report changes in neurological status  - Initiate measures to prevent increased intracranial pressure  - Maintain blood pressure and fluid volume within ordered parameters to optimize cerebral perfusion and minimize risk of hemorrhage  - Monitor temperature, glucose, and sodium. Initiate appropriate interventions as ordered  Outcome: Progressing

## 2024-08-05 LAB
ATRIAL RATE: 63 BPM
GLUCOSE BLDC GLUCOMTR-MCNC: 105 MG/DL (ref 70–99)
GLUCOSE BLDC GLUCOMTR-MCNC: 125 MG/DL (ref 70–99)
GLUCOSE BLDC GLUCOMTR-MCNC: 95 MG/DL (ref 70–99)
GLUCOSE BLDC GLUCOMTR-MCNC: 99 MG/DL (ref 70–99)
P AXIS: 36 DEGREES
P-R INTERVAL: 178 MS
Q-T INTERVAL: 412 MS
QRS DURATION: 84 MS
QTC CALCULATION (BEZET): 421 MS
R AXIS: 19 DEGREES
T AXIS: 10 DEGREES
VENTRICULAR RATE: 63 BPM

## 2024-08-05 PROCEDURE — 95816 EEG AWAKE AND DROWSY: CPT | Performed by: INTERNAL MEDICINE

## 2024-08-05 RX ORDER — PANTOPRAZOLE SODIUM 40 MG/1
40 TABLET, DELAYED RELEASE ORAL 2 TIMES DAILY
Status: SHIPPED | COMMUNITY
Start: 2024-08-05 | End: 2024-08-06

## 2024-08-05 RX ORDER — CARVEDILOL 6.25 MG/1
6.25 TABLET ORAL 2 TIMES DAILY WITH MEALS
Qty: 60 TABLET | Refills: 0 | Status: SHIPPED | OUTPATIENT
Start: 2024-08-05

## 2024-08-05 NOTE — DISCHARGE SUMMARY
[facility] Hospitalist Discharge Summary   Patient ID:  David Silvestre  [id number]  84 year old  2/15/1940    Admit date: 8/1/2024  Discharge date: 8/5/2024    Primary Care Physician: Rome Morgan MD   Attending Physician: Jailene Galindo MD   Consults:   Consultants         Provider   Role Specialty     Evita Byrnes DO      Consulting Physician NEUROLOGY     Alberto Mancia DO      Consulting Physician NEUROLOGY     Tab Adame MD      Consulting Physician CARDIOLOGY            Hospital Discharge Diagnoses:   Syncope, near  ----See D/C Summary for further Dx    Risk of Readmission Lace+ Score: 68  59-90 High Risk  29-58 Medium Risk  0-28   Low Risk.    TCM Follow-Up Recommendation:  LACE > 58: High Risk of readmission after discharge from the hospital.    Please note that only IHP DMG and EMG patients enrolled in the Medicare ACO, BCBS ACO and BCBS HMOs will be handled by the [facility] Care Management team.  For all other patients, please follow usual protocol for discharge care transition.    Reason for admission  Per H/P Dated 8/2/2024  by Dr Ramsey  This is a 84-year-old male with history of coronary artery disease status post CABG 2014 then PCI in 2023, CKD, hypertension, hyperlipidemia, presents to the hospital for evaluation of multiple falls. The patient lives at home with family however they are out of town on vacation. The patient states the last few days he noticed that he has had a few falls at home. Yesterday he states he could not get up from the fall and EMS was called. Yesterday morning he felt like he could barely sit up in a chair. The patient denies any chest pain, palpitations, or trouble breathing. He has had multiple changes to his blood pressure medicines in the recent past. He has noted symptoms of lightheadedness even as an outpatient and some medications were stopped. He also tells me that he feels like his speech is not normal. No trouble swallowing. No focal weakness. He  denies any paresthesias.     Hospital Course:    This is a 84-year-old male with history of coronary artery disease status post CABG 2014 then PCI in 2023, CKD, hypertension, hyperlipidemia, presents to the hospital for evaluation of multiple falls/syncope/presyncope attributed to hayden with orthostasis s/p IVF. Code stroke called with dysarthria/confusion/facial droop, seen by neurology with negative CTA brain and MRI, pt being tx for possible tia vs encephalopathy, will need neurology follow up. Per cardiology they desire pt to have higher bps. Plans for outpt zio monitor,  bp check and carotid US. [facility] to follow up. Appointments for pcp and cards done,see below for details      Falls/Syncope/Pre Syncope w/ Orthostasic Hypotension   -s/p IVF  -EKG negative for acute ischemia. Troponin normal  -Echo with 50% with WMA, grade1 DD, mild MR and mild TR  -bp meds adjusted, per cards want pt to have higher bp, pt is off procardia and toprol. Currently on coreg  -outpt bp check with zio monitor  -[facility]   -follow up with jose, Dr Means 8/21     ? TIA   Encephalopathy  -code stroke called during admission  -CTA brain and neck- no acute infarct/hemorrhage and B/L carotids with less than 50%  -MRI brain negative for acute process, mild microvascular white matter changes  -vitamin b and copper level pending  -negative for syphilis  -TSH normal  -EEG pending   -risk factor modification  -continue zetia (consider PCSK9 vs alternate statin), plavix and ASA  -outpt zio monitor with cards  -follow up with neurology as outpt with assessment of cognitive/memory-Dr Cali VAUGHN on CKD Stage 3  -baseline cr 1.4-1.6, admission Cr 2 now 1.6  -s/p IVF    Pre DM  -A1C 5.9    Carotid Disease- B/L  -CTA neck with less than 50% ICA stenosis   -outpt carotid US     CAD S/P CABG and PCI  HL-statin intolerant  HTN  -continue asa and zetia   -home toprol and procardia stopped, now on coreg  -? PCSK9 vs alternate statin, defer to usual  cardiologist  -follow up BP check with cards  -follow up with Dr Means 8/21    MA/MOLLY Reach  -ER Visits 2024: 1  -Admissions 2024: 1  -Re- Entry: no  -Consults: cards and neurology   -Discharge Needs:[facility]   -Appointments: [x ] PCP Rome Morgan MD - 8/9    EXAM:   GENERAL: no apparent distress  NEURO: A/A Ox3  RESP: non labored, CTA  CARDIO: Regular, no murmur  ABD: soft, NT, ND, BS+  EXTREMITIES: no edema    Radiology:   MRI BRAIN (CPT=70551)    Result Date: 8/2/2024  CONCLUSION: No acute intracranial process.  No evidence of acute or subacute infarct. There are mild microvascular white matter ischemic changes, likely related to long-standing hypertension and/or diabetes.   Dictated by (CST): Michael Parks MD on 8/02/2024 at 4:43 PM     Finalized by (CST): Michael Parks MD on 8/02/2024 at 4:46 PM          CT STROKE BRAIN (NO IV)(CPT=70450)    Result Date: 8/2/2024  CONCLUSION:  1. No acute infarct or hemorrhage. 2. Mild changes of chronic small vessel disease in cerebral white matter. 3. Large vessel intracranial atherosclerosis.  This report was called immediately to patient's nurse Ieva at 2:03 p.m..    Dictated by (CST): Camilo Alex MD on 8/02/2024 at 2:00 PM     Finalized by (CST): Camilo Alex MD on 8/02/2024 at 2:04 PM           Discharge Instructions     Medication List        START taking these medications      carvedilol 6.25 MG Tabs  Commonly known as: Coreg  Take 1 tablet (6.25 mg total) by mouth 2 (two) times daily with meals.            CONTINUE taking these medications      aspirin 81 MG Tbec     clopidogrel 75 MG Tabs  Commonly known as: Plavix  Take 1 tablet (75 mg total) by mouth daily.     COENZYME Q-10 OR     ezetimibe 10 MG Tabs  Commonly known as: Zetia  Take 1 tablet (10 mg total) by mouth nightly.     Magnesium 500 MG Tabs     nitroglycerin 0.4 MG Subl  Commonly known as: Nitrostat  Place 1 tablet (0.4 mg total) under the tongue as needed for Chest pain.     omega-3 fatty acids  1000 MG Caps  Commonly known as: Fish Oil     One-Daily Multi Vitamins Tabs     pantoprazole 40 MG Tbec  Commonly known as: Protonix     Saw Palmetto 450 MG Caps     Vitamin C 500 MG Tabs  Commonly known as: VITAMIN C            STOP taking these medications      LORazepam 1 MG Tabs  Commonly known as: Ativan     metoprolol succinate ER 25 MG Tb24  Commonly known as: Toprol XL     NIFEdipine ER 30 MG Tb24  Commonly known as: Adalat CC               Where to Get Your Medications        These medications were sent to [id number] - [place], [place] - [address] AT [facility], [phone], [phone]  [address], [facility] [address]      Hours: 24-hours Phone: [phone]   carvedilol 6.25 MG Tabs       Code Status: Full Code    Important follow up:   Follow-up Information       Rome Morgan MD. Call.    Specialty: Internal Medicine  Contact information:  [address] [facility] [address]  [facility] [address] [phone]               Evan Means, DO Follow up.    Specialty: Cardiovascular Diseases  Contact information:  [address]  [facility] [address]  [facility] [address] [phone]                           Disposition: home  Discharged Condition: fair  =========================================================================================================================    I Reconciled current and discharge medications on day of discharge  Patient had opportunity to ask questions and state understand and agree with therapeutic plan as outlined    Total Time Coordinating Care: greater than 30 minutes  Is this a shared or split note between Advanced Practice Provider and Physician? Yes    Note: This chart was prepared using voice recognition software and may contain unintended word substitution errors.     Carin Márquez RN, NP   [facility] Hospitalist Team   8/5/2024      SEE ATTENDING NOTE BELOW

## 2024-08-05 NOTE — PHYSICAL THERAPY NOTE
PHYSICAL THERAPY TREATMENT NOTE - INPATIENT     Room Number: COF14/COF14-A       Presenting Problem: syncope, presyncope  Co-Morbidities : htn, cad. htn    Problem List  Principal Problem:    Syncope, near  Active Problems:    Dehydration    Memory deficits    Orthostatic intolerance    Recurrent falls    TIA (transient ischemic attack)    Toxic metabolic encephalopathy    Cerebral atrophy (HCC)      PHYSICAL THERAPY ASSESSMENT   Patient demonstrates limited progress this session, goals  remain in progress.      Patient is requiring  min to CGA assist  as a result of the following impairments: decreased endurance/aerobic capacity, impaired standing balance, medical status, and low BP  .     Patient continues to function below baseline with transfers, gait, stair negotiation, standing prolonged periods, and performing household tasks.  Next session anticipate patient to progress bed mobility, transfers, gait, stair negotiation, standing prolonged periods, and performing household tasks.  Physical Therapy will continue to follow patient for duration of hospitalization.    Patient continues to benefit from continued skilled PT services: at discharge to promote prior level of function and safety with 24 hr care of family recommended needed at DC due to current status  and return home with home health PT.  Pt reports his family is out of town returning home wed.  Per Carin REIS pt dtr is a PT . Next door neighbor is APN.   If pt were to DC home prior to Wed pt would be alone.  Pt reports has not been up ambulating since admission .    SW team working with pt on optimal DC plan.     Therapy unable to progress activity due to pt symptoms and drop in BP during activity .   Pt with HFR and currently therapy recommending pt need for 24hr support at DC.  Pt Low BP a barrier to progression of activity . Pt with syncope episode on admission. Pt with vision changes reported, RN informed.   Therapy is not recommending a DC at this  time. Per last PT note pt will need a RW for DC --unable to progress pt to ambulation this visit.     PLAN  PT Treatment Plan: Bed mobility;Body mechanics;Endurance;Energy conservation;Patient education;Family education;Gait training;Balance training;Transfer training;Stair training  Frequency (Obs): 5x/week    SUBJECTIVE -----\"I can see these orange globes when I close my eyes and then open them again---everything is fuzzy with my vision \"      \" once sitting --I Am feeling dizzy \"     Pt also with left hand symptoms related to BP assessments resolved after BP assessment completed.  RN informed of all pt reports and symptoms .      OBJECTIVE  Precautions: Cardiac;Bed/chair alarm (orthostatic)    WEIGHT BEARING RESTRICTION                PAIN ASSESSMENT   Ratin          BALANCE  Static Sitting: Good  Dynamic Sitting: Good  Static Standing: Fair -  Dynamic Standing: Poor +    ACTIVITY TOLERANCE  Pulse: 60 (resting    sitting  62   standing  76    end of session  59)        BP: (!) 177/66 (supine  sitting  163/69   standing symptoms  107/80    reassessed continued symptoms   84/71     return to supine  122/64)      RN and Carin REIS primary care informed of pt symptoms and vitals         O2 WALK  Oxygen Therapy  SPO2% on Room Air at Rest: 97    AM-PAC '6-Clicks' INPATIENT SHORT FORM - BASIC MOBILITY  How much difficulty does the patient currently have...  Patient Difficulty: Turning over in bed (including adjusting bedclothes, sheets and blankets)?: None   Patient Difficulty: Sitting down on and standing up from a chair with arms (e.g., wheelchair, bedside commode, etc.): A Little   Patient Difficulty: Moving from lying on back to sitting on the side of the bed?: A Little   How much help from another person does the patient currently need...   Help from Another: Moving to and from a bed to a chair (including a wheelchair)?: A Little   Help from Another: Need to walk in hospital room?: A Little   Help from Another:  Climbing 3-5 steps with a railing?: A Little     AM-PAC Score:  Raw Score: 19   Approx Degree of Impairment: 41.77%   Standardized Score (AM-PAC Scale): 45.44   CMS Modifier (G-Code): CK    FUNCTIONAL ABILITY STATUS  Functional Mobility/Gait Assessment  Gait Assistance:  (unable to progress to ambulation due to symptomatic low  BP)  Distance (ft): low BP  return to bed  Assistive Device: Rolling walker to stand   Pattern:  (slow, cautious)  Rolling: contact guard assist  Supine to Sit: contact guard assist  Sit to Supine: stand-by assist  Sit to Stand: contact guard assist  Patient received supine in bed, agreeable to physical therapy. Vital signs monitored as noted above, patient experiencing vision symptoms see subjective and dizziness .  Pt with HFR and decrease activity tolerance  .     Education with pt  provided verbally and while practicing during session on Physical therapy plan of care, physiological benefits of out of bed mobility, and fall risk prevention , fxn mobility training , B AP and DC Planning  .     The patient's Approx Degree of Impairment: 41.77% has been calculated based on documentation in the [facility] '6 clicks' Inpatient Daily Activity Short Form.  Research supports that patients with this level of impairment may benefit from home with HH PT .   Pt with HFR , decrease activity tolerance with symptomatic low BP contributing to inability to progress mobility OOB .     Therapy will follow up tomorrow.   Pt does not demonstrate readiness for DC .  Therapy currently recommend pt need for 24hr family support at DC .   Pt will need a RW at DC per last PT note --ambulation not assessed this visit.      Final disposition will be made by interdisciplinary medical team.      Patient End of Session: In bed;Needs met;Call light within reach;RN aware of session/findings;All patient questions and concerns addressed;Alarm set    CURRENT GOALS   Goals to be met by: 8/9/24  Patient Goal Patient's self-stated  goal is: to return home   Goal #1 Patient is able to demonstrate supine - sit EOB @ level: independent      Goal #1   Current Status  CGA   Goal #2 Patient is able to demonstrate transfers Sit to/from Stand at assistance level: modified independent with  LRAD      Goal #2  Current Status  CGA    Goal #3 Patient is able to ambulate 100 feet with assist device:  LRAD  at assistance level: independent   Goal #3   Current Status  unable to progress    Goal #4 Patient will negotiate 5 stairs/one curb w/ assistive device and supervision   Goal #4   Current Status  NT    Goal #5 Patient to demonstrate independence with home activity/exercise instructions provided to patient in preparation for discharge.   Goal #5   Current Status  in progress    Goal #6     Goal #6  Current Status     Therapy activity 1 unit

## 2024-08-05 NOTE — CM/SW NOTE
SW followed up on DC planning.     Upon review of HHC list, [facility] accepted    SW received notification that [facility] would be choice if accepted    SW reserved [facility]     PLAN: home with [facility] pending LAURITA Hubbard, MSW ext. [phone]

## 2024-08-05 NOTE — PROGRESS NOTES
[facility] AND CARE   Progress Note  -  David Silvestre Patient Status:  Observation    2/15/1940 MRN [id number]   Location [facility] 3W/SW Attending Jailene Galindo MD   Hosp Day # 0 PCP Rome Morgan MD     PCP: Rome Morgan MD      SEE ATTENDING NOTE AT BOTTOM OF PAGE    Is this a shared or split note between Advanced Practice Provider and Physician? Yes    Assessment and Plan:    his is a 84-year-old male with history of coronary artery disease status post CABG  then PCI in , CKD, hypertension, hyperlipidemia, presents to the hospital for evaluation of multiple falls/syncope/presyncope attributed to hayden with orthostasis s/p IVF. Code stroke called with dysarthria/confusion/facial droop, seen by neurology with negative CTA brain and MRI, pt being tx for possible tia vs encephalopathy, will need neurology follow up. Per cardiology they desire pt to have higher bps. Plans for outpt zio monitor,  bp check and carotid US. [facility] to follow up. Plans for Appointments for pcp and cards. Home with orthostasis improved, see below for details      Falls/Syncope/Pre Syncope w/ Orthostasic Hypotension   -EKG negative for acute ischemia. Troponin normal  -Echo with 50% with WMA, grade1 DD, mild MR and mild TR  -bp meds adjusted, per cards want pt to have higher bp, pt is off procardia and toprol. Currently on coreg,may need to adjust with ongoing orthostasis   - continues to have orthostatic hypotension with drop in sbp to 84, will give IVF bolus   -outpt bp check with zio monitor  -plans for HHC at discharge, daughter will be home on Wednesday evening but there is NP neighbor next door      ? TIA   Encephalopathy  -code stroke called during admission  -CTA brain and neck- no acute infarct/hemorrhage and B/L carotids with less than 50%  -MRI brain negative for acute process, mild microvascular white matter changes  -vitamin b and copper level pending  -negative for syphilis  -TSH  normal  -EEG pending   -risk factor modification  -continue zetia (consider PCSK9 vs alternate statin), plavix and ASA  -outpt zio monitor with cards  -follow up with neurology as outpt with assessment of cognitive/memory-Dr Cali VAUGHN on CKD Stage 3  -baseline cr 1.4-1.6, admission Cr 2 now 1.6  -s/p IVF    Pre DM  -A1C 5.9     Carotid Disease- B/L  -CTA neck with less than 50% ICA stenosis   -outpt carotid US     CAD S/P CABG and PCI  HL-statin intolerant  HTN  -continue asa and zetia   -home toprol and procardia stopped, now on coreg  -? PCSK9 vs alternate statin, defer to usual cardiologist  -follow up BP check with cards  -follow up with Dr Means 8/21     MA/MOLLY Reach  -ER Visits 2024: 1  -Admissions 2024: 1  -Re- Entry: no  -Consults: cards and neurology   -Discharge Needs:[facility]   -Appointments: [ x] PCP Rome Morgan MD - 8/9       FEN  -lytes in am  -IVF bolus now   -diet-cardiac     Prophy  -SCD  -heparin    Dispo  -home when orthostasis improved and cleared by neurology  -8/5 update given to daughter Harriet via phone, she will be home from out of town on Wednesday night around 10 pm. Pt has neighbor who is NP next door that can help   PCP: Rome Morgan MD      Concerns regarding plan of care were discussed with patient. Patient agrees with plan as detailed above. Discussed plan of care with Dr. Galindo     Note: This chart was prepared using voice recognition software and may contain unintended word substitution errors.      Carin Márquez RN, NP   [facility] Hospitalist Team  Contact via [employer] (Check Availability)  8/5/2024    SUBJECTIVE:   Still feels like his memory is not good. Thinks dizziness is worse this  am. Per PT sbp drop to 84 with standing and pt had vision changes. EEG to be done today       OBJECTIVE:   Blood pressure (!) 172/79, pulse 55, temperature 98 °F (36.7 °C), temperature source Oral, resp. rate 18, weight 173 lb 11.2 oz (78.8 kg), SpO2  93%.    GENERAL: no apparent distress  NEURO: A/A Ox3  RESP: non labored, CTA  CARDIO: Regular, no murmur  ABD: soft, NT, ND, BS+  EXTREMITIES: no edema    DIAGNOSTIC DATA:   Labs:     Recent Labs   Lab 08/01/24 1920 08/02/24  0631 08/02/24  1454   WBC 4.7 3.4* 3.0*   HGB 12.4* 11.5* 12.1*   MCV 92.0 93.1 93.4   .0 157.0 161.0   INR  --   --  1.06       Recent Labs   Lab 08/01/24 1920 08/02/24  0631 08/02/24  1454    141 140   K 4.2 4.3 4.5    111 110   CO2 22.0 24.0 25.0   BUN 26* 21 20   CREATSERUM 2.01* 1.64* 1.42*   CA 8.8 9.0 9.8   MG 2.2  --   --    * 94 86       Recent Labs   Lab 08/02/24  1454   ALT 13   AST 17   ALB 4.2       Recent Labs   Lab 08/04/24  0613 08/04/24  1218 08/04/24  1720 08/04/24  2354 08/05/24  0607   PGLU 85 130* 92 125* 99       No results for input(s): \"TROP\" in the last 168 hours.        MEDICATIONS       lipase-protease-amylase (Lip-Prot-Amyl)  10,000 Units Oral See Admin Instructions    sodium chloride  500 mL Intravenous Once    carvedilol  6.25 mg Oral BID with meals    clopidogrel  75 mg Oral Daily    aspirin  81 mg Oral Daily    ezetimibe  10 mg Oral Nightly    pantoprazole  40 mg Oral BID    heparin  5,000 Units Subcutaneous Q8H JUSTICE         acetaminophen **OR** acetaminophen    ondansetron    metoclopramide    ibuprofen    Carin Márquez NP      IMAGING     MRI BRAIN (CPT=70551)    Result Date: 8/2/2024  CONCLUSION: No acute intracranial process.  No evidence of acute or subacute infarct. There are mild microvascular white matter ischemic changes, likely related to long-standing hypertension and/or diabetes.   Dictated by (CST): Michael Parks MD on 8/02/2024 at 4:43 PM     Finalized by (CST): Michael Parks MD on 8/02/2024 at 4:46 PM          CT STROKE BRAIN (NO IV)(CPT=70450)    Result Date: 8/2/2024  CONCLUSION:  1. No acute infarct or hemorrhage. 2. Mild changes of chronic small vessel disease in cerebral white matter. 3. Large vessel intracranial  atherosclerosis.  This report was called immediately to patient's nurse Ieva at 2:03 p.m..    Dictated by (CST): Camilo Alex MD on 8/02/2024 at 2:00 PM     Finalized by (CST): Camilo Alex MD on 8/02/2024 at 2:04 PM             SEE ATTENDING NOTE BELOW:

## 2024-08-05 NOTE — PROGRESS NOTES
Orthostatic + post fluid bolus   08/05/24 1154 08/05/24 1155 08/05/24 1158   Vital Signs   Pulse 52 53 55   Heart Rate Source Monitor Monitor Monitor   /65 139/67 96/60   MAP (mmHg) 86 89 72   BP Location Right arm Right arm Left arm   BP Method Automatic Automatic Automatic   Patient Position Lying Sitting Standing

## 2024-08-05 NOTE — PROGRESS NOTES
Cardiology Progress Note  [facility]    David Silvestre Patient Status:  Observation    2/15/1940 MRN [id number]   Location [facility] 3W/SW Attending Rina Ramsey, DO   Hosp Day # 0 PCP Rome Morgan MD     Assessment/Plan:    IMPRESSIONS:  Syncope and Presyncope, suspect r/t dehydration  - neuro workup ongoing, MRI brain negative for acute process.   - EKG, echo with no acute changes   CAD s/p CABG 5v   s/p JACOB  SVG RCA  JOHANA on CKD  HL, can only tolerate 1-2 days per week on statin  HTN, has had recent med changes          PLAN:  - continue present BP meds for now: labile SBP  past 24 hours. Has had multiple med changes up and down past several months.  - Alternate statin/PCSK9i to be determined as outpatient  - monitor rhythm on Telemetry  -plan for 14 day zio as outpatient         Subjective      No chest pain or dyspnea.          History:  Past Medical History:    BPH (benign prostatic hyperplasia)    CKD (chronic kidney disease)    Cold hands and feet    Coronary atherosclerosis of unspecified type of vessel, native or graft    Drug allergy    Hypertension    Hypervitaminosis D    Inguinal hernia without mention of obstruction or gangrene, unilateral or unspecified, (not specified as recurrent)    Mixed hyperlipidemia    Penicillin allergy    S/P coronary artery stent placement    Statin intolerance    Unspecified visual disturbance    Unspecified vitamin D deficiency    Vision abnormalities     Past Surgical History:   Procedure Laterality Date    Bypass surgery  2014    CABG x 5 LIMA-LAD, SVG- 2nd diagonal, 1st OM, Posterior descending & RCA    Cabg      5 vessel bypass    Colonoscopy  2/3/12    small adenoma. Repeat .    Colonoscopy N/A 2018    adenoma- repeat 5 yrs    Other surgical history  11    flow/us Dr. Gibson    Other surgical history  12    flow/us-Dr. Gibson    Other surgical history      HERNIA SX @ [facility]    Other surgical history   11/15/13    Linnea Gibson     Family History   Problem Relation Age of Onset    Heart Disorder Father         mi      reports that he has never smoked. He has never used smokeless tobacco. He reports that he does not drink alcohol and does not use drugs.    Allergies:  Allergies   Allergen Reactions    Thorazine [Chlorpromazine] CONFUSION and OTHER (SEE COMMENTS)     Per pt cva symptoms    Crestor [Rosuvastatin] PAIN and MYALGIA     Joint pain    Nexletol [Bempedoic Acid] OTHER (SEE COMMENTS)     Reportedly knee pain and variable BP fluctuation     Pravachol [Pravastatin] PAIN and MYALGIA     Joint pain     Repatha [Evolocumab] DIARRHEA and MYALGIA          Zocor [Simvastatin] PAIN and MYALGIA     joint    Imdur [Isosorbide] OTHER (SEE COMMENTS)     Head ache    Praluent [Alirocumab] DIARRHEA    Statins OTHER (SEE COMMENTS)     Per patient after 2-4 days the medication affects knees and patient is unable to walk.    Sulfa Antibiotics RASH       Medications:  No current facility-administered medications on file prior to encounter.     Current Outpatient Medications on File Prior to Encounter   Medication Sig Dispense Refill    NIFEdipine ER 30 MG Oral Tablet 24 Hr Take 1 tablet (30 mg total) by mouth daily. Takes in afternoon      Clopidogrel Bisulfate 75 MG Oral Tab Take 1 tablet (75 mg total) by mouth daily. 90 tablet 3    ezetimibe 10 MG Oral Tab Take 1 tablet (10 mg total) by mouth nightly. 90 tablet 3    Metoprolol Succinate ER 25 MG Oral Tablet 24 Hr Take 1 tablet (25 mg total) by mouth daily. 90 tablet 3    aspirin 81 MG Oral Tab EC Take 1 tablet (81 mg total) by mouth daily.  0    LORazepam 1 MG Oral Tab Take 0.5 tablets (0.5 mg total) by mouth as needed. (Patient taking differently: Take 0.25 mg by mouth as needed for Anxiety. For SBP >180) 30 tablet 0    nitroGLYCERIN 0.4 MG Sublingual SL Tab Place 1 tablet (0.4 mg total) under the tongue as needed for Chest pain. 90 tablet 3    Saw Palmetto 450 MG Oral  Cap Take by mouth daily.      Multiple Vitamin (ONE-DAILY MULTI VITAMINS) Oral Tab Take 1 tablet by mouth daily.      Magnesium 500 MG Oral Tab Take 1 tablet (500 mg total) by mouth daily.      COENZYME Q-10 OR Take by mouth. daily      Vitamin C (VITAMIN C) 500 MG Oral Tab Take 1 tablet (500 mg total) by mouth daily.      FISH OIL 1000 MG OR CAPS 2-3 CAPSULES DAILY         Review of Systems:  Constitutional: denies fevers, chills, night sweats  HEENT: denies headache, vision changes, trouble or pain with swallowing  Cardiac: denies chest pain, palpitations, edema  Pulm: denies dyspnea, cough, wheeze  GI: denies n/v, abd pain, diarrhea or constipation  : denies hematuria, dysuria, incontinence  MSK: denies muscle or joint pains  Neuro: denies numbness, weakness, paresthesias  Psych: denies anxiety, depression  Integument: denies skin rashes or lesions  Heme: denies easy bruising or bleeding  Endo: denies heat/cold intolerance, skin or nail changes      Physical Exam:  Blood pressure 157/70, pulse 60, temperature 98 °F (36.7 °C), temperature source Oral, resp. rate 18, weight 173 lb 11.2 oz (78.8 kg), SpO2 95%.  Wt Readings from Last 3 Encounters:   08/05/24 173 lb 11.2 oz (78.8 kg)   01/22/24 160 lb (72.6 kg)   03/16/22 162 lb 11.2 oz (73.8 kg)       General: awake, alert, oriented x 3, no acute distress  HEENT: at/nc, perrl, eomi  Neck: No JVD, carotids 2+ no bruits.  Cardiac: Regular rate and rhythm, S1, S2 normal, no murmur, rub or gallop.  Lungs: Clear without wheezes, rales, rhonchi or dullness.  Normal excursions and effort.  Abdomen: Soft, non-tender, non-distended, normal bowel sounds   Extremities: Without clubbing, cyanosis or edema.  Peripheral pulses are 2+.  Neurologic: Alert and oriented, normal affect.  Psych: normal mood and affect  Skin: Warm and dry.       Laboratories and Data:  Diagnostics:  Tele:     Echo:   Conclusions:     1. Left ventricle: The cavity size was normal. Wall thickness was  normal.      Systolic function was at the lower limits of normal. The estimated      ejection fraction was 50%, by biplane method of disks. Hypokinesis of the      apicalinferior wall. Doppler parameters are consistent with abnormal left      ventricular relaxation - grade 1 diastolic dysfunction.   2. Left atrium: The atrium was mildly enlarged.   3. Mitral valve: There was mild regurgitation.   4. Tricuspid valve: There was mild regurgitation.   5. Pulmonary arteries: Systolic pressure was mildly increased, estimated to      be 41mm Hg.         CATH 3/23:  · Severe native multivessel disease. Patent LIMA-LAD, SVG-D1, and SVG-OM1.   There is a severe stenosis in the proximal SVG-RCA. The sequential portion   of the graft to the RPDA is occluded. The limb to the RPL is patent.   · Successful PCI of the proximal SVG-RCA with 3.5x30mm Giacomo JACOB x 1.     1) ASA 81mg and clopidogrel 75mg daily.   2) Post procedural IVF in the setting of CKD   3) Cardiology inpatient service to follow.     Coronary Findings Diagnostic Dominance: Right   Left Main: Ost LM lesion with 30% stenosis.   Left Anterior Descending: Prox LAD to Mid LAD lesion with 95% stenosis. Partially in stent. First Diagonal Branch: 1st Diag lesion with 100% stenosis. The vessel is total chronically occluded.   Left Circumflex: Patent stents from the mid LCX into OM2. First Obtuse Marginal Branch: 1st Mrg lesion with 100% stenosis. The vessel is total chronically occluded.   Right Coronary Artery: Mid RCA lesion with 100% stenosis. The vessel is total chronically occluded.   Graft To 1st Mrg: The graft is free of disease.   Graft To Dist RCA: There is occlusion of the Y-portion of the graft to the RPDA. The portion to the RPL is patent. The proximal main graft has a severe stenosis. Prox Graft lesion with 80% stenosis.   Graft To 1st Diag: The graft exhibits minimal (20%) luminal irregularities.   LIMA Graft To Mid LAD: The graft is free of disease.      Intervention   Prox Graft lesion (Graft To Dist RCA): Stent: Post-Intervention Lesion Assessment: Pre-intervention KIZZY flow: 3. Post-intervention KIZZY flow is 3. There is a 0% residual stenosis post intervention.     Labs:   CBC:    Lab Results   Component Value Date    WBC 3.0 (L) 08/02/2024    WBC 3.4 (L) 08/02/2024    WBC 4.7 08/01/2024     Lab Results   Component Value Date    HEMOGLOBIN 13.9 07/06/2012    HEMOGLOBIN 14.0 05/09/2012    HEMOGLOBIN 14.4 04/06/2012    HGB 12.1 (L) 08/02/2024    HGB 11.5 (L) 08/02/2024    HGB 12.4 (L) 08/01/2024      Lab Results   Component Value Date    .0 08/02/2024    .0 08/02/2024    .0 08/01/2024     BMP:     Lab Results   Component Value Date    GLUCOSE 88 06/12/2014    GLUCOSE 90 04/03/2013    GLUCOSE 86 04/06/2012     Lab Results   Component Value Date    K 4.5 08/02/2024    K 4.3 08/02/2024    K 4.2 08/01/2024     Lab Results   Component Value Date    BUN 20 08/02/2024    BUN 21 08/02/2024    BUN 26 (H) 08/01/2024     Lab Results   Component Value Date    CREATSERUM 1.42 (H) 08/02/2024    CREATSERUM 1.64 (H) 08/02/2024    CREATSERUM 2.01 (H) 08/01/2024     Cholesterol:     Lab Results   Component Value Date    CHOLEST 168.00 03/09/2021    CHOLEST 145.00 10/22/2020    CHOLEST 176.00 06/27/2019     Lab Results   Component Value Date    HDL 50 03/09/2021    HDL 43 10/22/2020    HDL 35 (L) 06/27/2019     Lab Results   Component Value Date    TRIG 123.00 03/09/2021    TRIG 84.00 10/22/2020    TRIG 157.00 (H) 06/27/2019    TRIGLY 103 06/12/2014    TRIGLY 111 04/03/2013    TRIGLY 131 10/31/2012     Lab Results   Component Value Date    LDL 93 03/09/2021    LDL 85 10/22/2020     06/27/2019     Lab Results   Component Value Date    AST 17 08/02/2024    AST 14 03/09/2021    AST 16 03/02/2020     Lab Results   Component Value Date    ALT 13 08/02/2024    ALT 11 03/09/2021    ALT 9 03/02/2020       Attending addendum:  I have seen and examined patient,  discussed with NP.  Agree with assessment and plan as outlined above.    General: awake, alert, oriented x 3, no acute distress  HEENT: at/nc, perrl, eomi  Neck: No JVD, carotids 2+ no bruits.  Cardiac: Regular rate and rhythm, S1, S2 normal, no murmur, rub or gallop.  Lungs: Clear without wheezes, rales, rhonchi or dullness.  Normal excursions and effort.  Abdomen: Soft, non-tender, non-distended, normal bowel sounds   Extremities: Without clubbing, cyanosis or edema.  Peripheral pulses are 2+.  Neurologic: Alert and oriented, normal affect.  Psych: normal mood and affect  Skin: Warm and dry.

## 2024-08-05 NOTE — CM/SW NOTE
Department  notified of request for HHC, aidin referrals started. Assigned CM/SW to follow up with pt/family on further discharge planning.     Vivian Gaona, DSC

## 2024-08-05 NOTE — PLAN OF CARE
No complaints overnight. A/ox4. Neuro checks q4 with no acute changes.  Problem: Patient Centered Care  Goal: Patient preferences are identified and integrated in the patient's plan of care  Description: Interventions:  - What would you like us to know as we care for you?   - Provide timely, complete, and accurate information to patient/family  - Incorporate patient and family knowledge, values, beliefs, and cultural backgrounds into the planning and delivery of care  - Encourage patient/family to participate in care and decision-making at the level they choose  - Honor patient and family perspectives and choices  Outcome: Progressing     Problem: Patient/Family Goals  Goal: Patient/Family Long Term Goal  Description: Patient's Long Term Goal:   Interventions:  - See additional Care Plan goals for specific interventions  Outcome: Progressing  Goal: Patient/Family Short Term Goal  Description: Patient's Short Term Goal:   Interventions:  - See additional Care Plan goals for specific interventions  Outcome: Progressing     Problem: CARDIOVASCULAR - ADULT  Goal: Maintains optimal cardiac output and hemodynamic stability  Description: INTERVENTIONS:  - Monitor vital signs, rhythm, and trends  - Monitor for bleeding, hypotension and signs of decreased cardiac output  - Evaluate effectiveness of vasoactive medications to optimize hemodynamic stability  - Monitor arterial and/or venous puncture sites for bleeding and/or hematoma  - Assess quality of pulses, skin color and temperature  - Assess for signs of decreased coronary artery perfusion - ex. Angina  - Evaluate fluid balance, assess for edema, trend weights  Outcome: Progressing  Goal: Absence of cardiac arrhythmias or at baseline  Description: INTERVENTIONS:  - Continuous cardiac monitoring, monitor vital signs, obtain 12 lead EKG if indicated  - Evaluate effectiveness of antiarrhythmic and heart rate control medications as ordered  - Initiate emergency measures for  life threatening arrhythmias  - Monitor electrolytes and administer replacement therapy as ordered  Outcome: Progressing     Problem: SAFETY ADULT - FALL  Goal: Free from fall injury  Description: INTERVENTIONS:  - Assess pt frequently for physical needs  - Identify cognitive and physical deficits and behaviors that affect risk of falls.  - [place] fall precautions as indicated by assessment.  - Educate pt/family on patient safety including physical limitations  - Instruct pt to call for assistance with activity based on assessment  - Modify environment to reduce risk of injury  - Provide assistive devices as appropriate  - Consider OT/PT consult to assist with strengthening/mobility  - Encourage toileting schedule  Outcome: Progressing     Problem: NEUROLOGICAL - ADULT  Goal: Achieves stable or improved neurological status  Description: INTERVENTIONS  - Assess for and report changes in neurological status  - Initiate measures to prevent increased intracranial pressure  - Maintain blood pressure and fluid volume within ordered parameters to optimize cerebral perfusion and minimize risk of hemorrhage  - Monitor temperature, glucose, and sodium. Initiate appropriate interventions as ordered  Outcome: Progressing

## 2024-08-05 NOTE — PLAN OF CARE
Patient orthostatic + this AM and symptomatic. 500 ml fluid bolus x2 given per order. This afternoon patient developed hypertension with BP in 200s - Dr. Galindo notified. EEG completed. Plan is for BP management. Patient and daughter em updated via telephone by this RN.     Problem: Patient Centered Care  Goal: Patient preferences are identified and integrated in the patient's plan of care  Description: Interventions:  - What would you like us to know as we care for you? I live with my daughter  - Provide timely, complete, and accurate information to patient/family  - Incorporate patient and family knowledge, values, beliefs, and cultural backgrounds into the planning and delivery of care  - Encourage patient/family to participate in care and decision-making at the level they choose  - Honor patient and family perspectives and choices  Outcome: Progressing     Problem: Patient/Family Goals  Goal: Patient/Family Long Term Goal  Description: Patient's Long Term Goal: go home    Interventions:  - medications  - consults  - fluid bolus   - See additional Care Plan goals for specific interventions  Outcome: Progressing  Goal: Patient/Family Short Term Goal  Description: Patient's Short Term Goal: manage dizziness    Interventions:   - bp medication adjustment  - neuro + cards consult  - See additional Care Plan goals for specific interventions  Outcome: Progressing     Problem: CARDIOVASCULAR - ADULT  Goal: Maintains optimal cardiac output and hemodynamic stability  Description: INTERVENTIONS:  - Monitor vital signs, rhythm, and trends  - Monitor for bleeding, hypotension and signs of decreased cardiac output  - Evaluate effectiveness of vasoactive medications to optimize hemodynamic stability  - Monitor arterial and/or venous puncture sites for bleeding and/or hematoma  - Assess quality of pulses, skin color and temperature  - Assess for signs of decreased coronary artery perfusion - ex. Angina  - Evaluate fluid  balance, assess for edema, trend weights  Outcome: Progressing  Goal: Absence of cardiac arrhythmias or at baseline  Description: INTERVENTIONS:  - Continuous cardiac monitoring, monitor vital signs, obtain 12 lead EKG if indicated  - Evaluate effectiveness of antiarrhythmic and heart rate control medications as ordered  - Initiate emergency measures for life threatening arrhythmias  - Monitor electrolytes and administer replacement therapy as ordered  Outcome: Progressing     Problem: SAFETY ADULT - FALL  Goal: Free from fall injury  Description: INTERVENTIONS:  - Assess pt frequently for physical needs  - Identify cognitive and physical deficits and behaviors that affect risk of falls.  - [place] fall precautions as indicated by assessment.  - Educate pt/family on patient safety including physical limitations  - Instruct pt to call for assistance with activity based on assessment  - Modify environment to reduce risk of injury  - Provide assistive devices as appropriate  - Consider OT/PT consult to assist with strengthening/mobility  - Encourage toileting schedule  Outcome: Progressing     Problem: NEUROLOGICAL - ADULT  Goal: Achieves stable or improved neurological status  Description: INTERVENTIONS  - Assess for and report changes in neurological status  - Initiate measures to prevent increased intracranial pressure  - Maintain blood pressure and fluid volume within ordered parameters to optimize cerebral perfusion and minimize risk of hemorrhage  - Monitor temperature, glucose, and sodium. Initiate appropriate interventions as ordered  Outcome: Progressing

## 2024-08-05 NOTE — PROCEDURES
Inpatient Video EEG report    REFERRING PHYSICIAN: Jailene Galindo MD      DURATION: 20:47  TECHNIQUE: 21 channels of EEG, 2 channels of EOG, and 1 channel of EKG were recorded utilizing the 10-20 International System of Electrode Placement. The recording was performed in a digitized monopolar referential format and playback was reformatted into various referential and bipolar montages utilizing appropriate filter settings. Automatic seizure and spike detection programs were utilized throughout the recording. Video was recorded during the study  CLINICAL DATA:  Patient is sent for the evaluation of possible seizures.    MEDICATION:  Continuous Medications:    Scheduled Medications:    Current Outpatient Medications:     carvedilol 6.25 MG Oral Tab, Take 1 tablet (6.25 mg total) by mouth 2 (two) times daily with meals., Disp: 60 tablet, Rfl: 0    pantoprazole 40 MG Oral Tab EC, Take 1 tablet (40 mg total) by mouth 2 (two) times daily., Disp: , Rfl:   PRN Medications:    acetaminophen **OR** acetaminophen    ondansetron    metoclopramide    ibuprofen      BACKGROUND:  The posterior dominant rhythm consisted of well-organized 8-9 Hz rhythmic waveforms, symmetrically distributed over both posterior quadrants and was reactive to eye opening.    Sleep: Stage II sleep was not achieved.  Drowsiness showed generalized attenuation and slower frequencies.    EEG ABNORMALITY:  Slowing:Mild background slowing noted throughout the recording.   Epileptiform activity:None  Seizures: None  Abnormal movements:None    IMPRESSION:  This is a mildly abnormal awake and drowsy EEG.         Mild slowing of the background activity was seen  No epileptiform activity nor seizures were seen    This is overall suggestive of a mild global encephalopathy, etiologically nonspecific in origin.    Karie Gonzalez M.D.  Neurology

## 2024-08-06 LAB
GLUCOSE BLDC GLUCOMTR-MCNC: 107 MG/DL (ref 70–99)
GLUCOSE BLDC GLUCOMTR-MCNC: 92 MG/DL (ref 70–99)

## 2024-08-06 RX ORDER — PANTOPRAZOLE SODIUM 40 MG/1
40 TABLET, DELAYED RELEASE ORAL 2 TIMES DAILY
Status: SHIPPED | COMMUNITY
Start: 2024-08-06

## 2024-08-06 RX ORDER — HYDRALAZINE HYDROCHLORIDE 25 MG/1
25 TABLET, FILM COATED ORAL EVERY 8 HOURS SCHEDULED
Status: DISCONTINUED | OUTPATIENT
Start: 2024-08-06 | End: 2024-08-06

## 2024-08-06 RX ORDER — HYDRALAZINE HYDROCHLORIDE 25 MG/1
25 TABLET, FILM COATED ORAL ONCE
Status: COMPLETED | OUTPATIENT
Start: 2024-08-06 | End: 2024-08-06

## 2024-08-06 NOTE — PROGRESS NOTES
[facility]  part of [facility]    Cardiology Progress Note    David Silvestre Patient Status:  Observation    2/15/1940 MRN [id number]   Location [facility] 3W/SW Attending Jailene Galindo MD   Hosp Day # 0 PCP Rome Morgan MD       Impression/Plan:  84 year old male presenting with:    Syncope and Presyncope, +orthostatics  - neuro workup ongoing, MRI brain negative for acute process.   - EKG, echo with no acute changes   CAD s/p CABG 5v   s/p JACOB  SVG RCA  JOHANA on CKD  HL, can only tolerate 1-2 days per week on statin  HTN, has had recent med changes    - Continue present BP meds for now (carvedilol 6.25mg bid); will need to allow permissive supine HTN to avoid orthostatic hypotension.   - Daily orthostatics  - Cont asa, plavix, zetia  - Alternate statin/PCSK9i to be determined as outpatient  - PT/OT  - Monitor rhythm on telemetry; consider outpatient monitor on discharge  - Will follow     Subjective: No events overnight.  Today patient reports feeling unsteady with ambulation, denies chest pain, palpitations, dyspnea.    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)    Coronary artery disease involving coronary bypass graft of native heart without angina pectoris    Atherosclerosis of aorta (HCC)    Ischemic cardiomyopathy    Diastolic dysfunction    Hypertensive heart and chronic kidney disease with heart failure and stage 1 through stage 4 chronic kidney disease, or unspecified chronic kidney disease (HCC)    Brain atrophy (HCC)    Anemia, unspecified type    Peptic ulcer disease    Stage 3  chronic kidney disease, unspecified whether stage 3a or 3b CKD (HCC)    Mood disorder (HCC)    Syncope, near    Dehydration    Memory deficits    Orthostatic intolerance    Recurrent falls    TIA (transient ischemic attack)    Toxic metabolic encephalopathy    Cerebral atrophy (HCC)       Objective:   Temp: 98.1 °F (36.7 °C)  Pulse: 56  Resp: 18  BP: 130/82    Intake/Output:     Intake/Output Summary (Last 24 hours) at 8/6/2024 1209  Last data filed at 8/6/2024 1032  Gross per 24 hour   Intake 955 ml   Output 2200 ml   Net -1245 ml       Last 3 Weights   08/06/24 0510 172 lb 6.4 oz (78.2 kg)   08/05/24 0650 173 lb 11.2 oz (78.8 kg)   08/04/24 0550 174 lb 8 oz (79.2 kg)   08/01/24 2353 173 lb 14.4 oz (78.9 kg)   01/22/24 1247 160 lb (72.6 kg)   03/16/22 0806 162 lb 11.2 oz (73.8 kg)       Tele: SR/SB    Physical Exam:    General: Alert and oriented x 3. No apparent distress. No respiratory or constitutional distress.  HEENT: Normocephalic, anicteric sclera, neck supple, no thyromegaly or adenopathy.  Neck: No JVD, carotids 2+, no bruits.  Cardiac: Regular rate and rhythm. S1, S2 normal. No murmur, pericardial rub, S3, thrill, heave or extra cardiac sounds.  Lungs: Clear without wheezes, rales, rhonchi or dullness.  Normal excursions and effort.  Abdomen: Soft, non-tender. No organosplenomegally, mass or rebound, BS-present.  Extremities: Without clubbing, cyanosis or edema.  Peripheral pulses are 2+.  Neurologic: Alert and oriented, normal affect. No motor or coordinational deficit.  Skin: Warm and dry.     Laboratory/Data:    Labs:         Recent Labs   Lab 08/01/24  1920 08/02/24  0631 08/02/24  1454   WBC 4.7 3.4* 3.0*   HGB 12.4* 11.5* 12.1*   MCV 92.0 93.1 93.4   .0 157.0 161.0   INR  --   --  1.06       Recent Labs   Lab 08/01/24 1920 08/02/24  0631 08/02/24  1454    141 140   K 4.2 4.3 4.5    111 110   CO2 22.0 24.0 25.0   BUN 26* 21 20   CREATSERUM 2.01* 1.64* 1.42*   CA 8.8 9.0 9.8   MG  2.2  --   --    * 94 86       Recent Labs   Lab 08/02/24  1454   ALT 13   AST 17   ALB 4.2       No results for input(s): \"TROP\" in the last 168 hours.    Allergies:   Allergies   Allergen Reactions    Thorazine [Chlorpromazine] CONFUSION and OTHER (SEE COMMENTS)     Per pt cva symptoms    Crestor [Rosuvastatin] PAIN and MYALGIA     Joint pain    Nexletol [Bempedoic Acid] OTHER (SEE COMMENTS)     Reportedly knee pain and variable BP fluctuation     Pravachol [Pravastatin] PAIN and MYALGIA     Joint pain     Repatha [Evolocumab] DIARRHEA and MYALGIA          Zocor [Simvastatin] PAIN and MYALGIA     joint    Imdur [Isosorbide] OTHER (SEE COMMENTS)     Head ache    Praluent [Alirocumab] DIARRHEA    Statins OTHER (SEE COMMENTS)     Per patient after 2-4 days the medication affects knees and patient is unable to walk.    Sulfa Antibiotics RASH       Medications:  Current Facility-Administered Medications   Medication Dose Route Frequency    pancrelipase (Lip-Prot-Amyl) (Zenpep) DR particles cap 10,000 Units  10,000 Units Oral See Admin Instructions    sodium chloride 0.9 % IV bolus 1,000 mL  1,000 mL Intravenous Once    carvedilol (Coreg) tab 6.25 mg  6.25 mg Oral BID with meals    acetaminophen (Tylenol) tab 650 mg  650 mg Oral Q4H PRN    Or    acetaminophen (Tylenol) rectal suppository 650 mg  650 mg Rectal Q4H PRN    ondansetron (Zofran) 4 MG/2ML injection 4 mg  4 mg Intravenous Q6H PRN    metoclopramide (Reglan) 5 mg/mL injection 5 mg  5 mg Intravenous Q8H PRN    clopidogrel (Plavix) tab 75 mg  75 mg Oral Daily    ibuprofen (Motrin) tab 400 mg  400 mg Oral TID PRN    aspirin DR tab 81 mg  81 mg Oral Daily    ezetimibe (Zetia) tab 10 mg  10 mg Oral Nightly    pantoprazole (Protonix) DR tab 40 mg  40 mg Oral BID    heparin (Porcine) 5000 UNIT/ML injection 5,000 Units  5,000 Units Subcutaneous Q8H [facility]       Rafi Rhoades MD  8/6/2024  12:09 PM

## 2024-08-06 NOTE — HOME CARE LIAISON
[facility] Spoke w/ patient who is agreeable with [facility]. Contact information placed on AVS.

## 2024-08-06 NOTE — PHYSICAL THERAPY NOTE
PHYSICAL THERAPY TREATMENT NOTE - INPATIENT     Room Number: COF14/COF14-A       Presenting Problem: syncope, presyncope  Co-Morbidities : htn, cad. htn    Problem List  Principal Problem:    Syncope, near  Active Problems:    Dehydration    Memory deficits    Orthostatic intolerance    Recurrent falls    TIA (transient ischemic attack)    Toxic metabolic encephalopathy    Cerebral atrophy (HCC)      PHYSICAL THERAPY ASSESSMENT   Patient demonstrates limited progress this session, goals  updated to reflect patient performance.      Patient is requiring contact guard assist as a result of the following impairments: decreased functional strength, decreased endurance/aerobic capacity, impaired standing balance, decreased muscular endurance, and medical status.     Patient continues to function below baseline with bed mobility, transfers, gait, stair negotiation, maintaining seated position, standing prolonged periods, and performing household tasks.  Next session anticipate patient to progress bed mobility, transfers, gait, stair negotiation, maintaining seated position, and standing prolonged periods.  Physical Therapy will continue to follow patient for duration of hospitalization.    Patient continues to benefit from continued skilled PT services: at discharge to promote prior level of function.  Anticipate patient will return home with home health PT. Family is out of town 1 wk.     PLAN  PT Treatment Plan: Bed mobility;Body mechanics;Endurance;Energy conservation;Patient education;Gait training;Strengthening;Transfer training;Balance training  Frequency (Obs): 5x/week    SUBJECTIVE  I feel unsteady and stand walking like I am not 100%. I have not been walking for a while.     OBJECTIVE  Precautions: Cardiac;Bed/chair alarm (orthostatic)    PAIN ASSESSMENT   Ratin          BALANCE  Static Sitting: Good  Dynamic Sitting: Good  Static Standing: Fair  Dynamic Standing: Fair -    ACTIVITY TOLERANCE  Pulse:  56  Heart Rate Source: Monitor  Resp: 18  BP: 130/82 (supine 192/79, sit 163/108, stand 130/82)  BP Location: Right arm  BP Method: Automatic  Patient Position: Standing     O2 WALK  Oxygen Therapy  SPO2% on Room Air at Rest: 97  SPO2% Ambulation on Room Air: 96    AM-PAC '6-Clicks' INPATIENT SHORT FORM - BASIC MOBILITY  How much difficulty does the patient currently have...  Patient Difficulty: Turning over in bed (including adjusting bedclothes, sheets and blankets)?: None   Patient Difficulty: Sitting down on and standing up from a chair with arms (e.g., wheelchair, bedside commode, etc.): None   Patient Difficulty: Moving from lying on back to sitting on the side of the bed?: None   How much help from another person does the patient currently need...   Help from Another: Moving to and from a bed to a chair (including a wheelchair)?: A Little   Help from Another: Need to walk in hospital room?: A Little   Help from Another: Climbing 3-5 steps with a railing?: A Little     AM-PAC Score:  Raw Score: 21   Approx Degree of Impairment: 28.97%   Standardized Score (AM-PAC Scale): 50.25   CMS Modifier (G-Code): CJ    FUNCTIONAL ABILITY STATUS  Functional Mobility/Gait Assessment  Gait Assistance: Contact guard assist  Distance (ft): 50  Assistive Device: Rolling walker  Pattern: Shuffle (step to gait decreased step length and alan)  Rolling: stand-by assist  Supine to Sit: stand-by assist  Sit to Supine: contact guard assist  Sit to Stand: contact guard assist    Skilled Therapy Provided: Pt ed with bed mobility and transfers with SBA with RW. Pt ed with gait progression 50' with shuffling unsteady gait /82 with standing and gait with RW CGA. Elevated fall risk > 80% on Vinson balance scale CGA and RW recommended for gait and transfers. Pt assisted to a chair to eat breakfast.     The patient's Approx Degree of Impairment: 28.97% has been calculated based on documentation in the [facility] '6 clicks' Inpatient Daily  Activity Short Form.  Research supports that patients with this level of impairment may benefit from [facility] PT family assist.  Final disposition will be made by interdisciplinary medical team.    THERAPEUTIC EXERCISES  Lower Extremity Ankle pumps  Heel slides  LAQ     Position Sitting & Standing       Patient End of Session: Up in chair;With  staff;Needs met;Call light within reach;RN aware of session/findings;All patient questions and concerns addressed;Alarm set    CURRENT GOALS   CURRENT GOALS  Goals to be met by: 8/9/24  Patient Goal Patient's self-stated goal is: to return home   Goal #1 Patient is able to demonstrate supine - sit EOB @ level: independent      Goal #1   Current Status  SBA   Goal #2 Patient is able to demonstrate transfers Sit to/from Stand at assistance level: modified independent with  LRAD      Goal #2  Current Status  CGA with RW   Goal #3 Patient is able to ambulate 100 feet with assist device:  LRAD  at assistance level: independent   Goal #3   Current Status  CGA with amb 50' unsteady gait   Goal #4 Patient will negotiate 5 stairs/one curb w/ assistive device and supervision   Goal #4   Current Status  Ongoing   Goal #5 Patient to demonstrate independence with home activity/exercise instructions provided to patient in preparation for discharge.   Goal #5   Current Status  Ongoing   Goal #6     Goal #6  Current Status       Gait Training: 15 minutes  Therapeutic Exercise: 8 minutes

## 2024-08-06 NOTE — PLAN OF CARE
Pt alert and oriented x4. Standby assist with walker. Orthostatic (+). Neuro Qshift. NIH daily. Pt updated on plan of care. Plan to discharge with HH when medically cleared. Safety precautions in place. Call light within reach.    Problem: Patient Centered Care  Goal: Patient preferences are identified and integrated in the patient's plan of care  Description: Interventions:  - What would you like us to know as we care for you?  - Provide timely, complete, and accurate information to patient/family  - Incorporate patient and family knowledge, values, beliefs, and cultural backgrounds into the planning and delivery of care  - Encourage patient/family to participate in care and decision-making at the level they choose  - Honor patient and family perspectives and choices  Outcome: Progressing     Problem: Patient/Family Goals  Goal: Patient/Family Long Term Goal  Description: Patient's Long Term Goal: discharge home    Interventions:  - Monitor vital signs  - Neurological assessments  - Orthostatics  - Ambulate as tolerated  - See additional Care Plan goals for specific interventions  Outcome: Progressing  Goal: Patient/Family Short Term Goal  Description: Patient's Short Term Goal: feel better    Interventions:   - Ambulate as tolerated  - Orthostatic education  - Continue ADLs  - See additional Care Plan goals for specific interventions  Outcome: Progressing     Problem: CARDIOVASCULAR - ADULT  Goal: Maintains optimal cardiac output and hemodynamic stability  Description: INTERVENTIONS:  - Monitor vital signs, rhythm, and trends  - Monitor for bleeding, hypotension and signs of decreased cardiac output  - Evaluate effectiveness of vasoactive medications to optimize hemodynamic stability  - Monitor arterial and/or venous puncture sites for bleeding and/or hematoma  - Assess quality of pulses, skin color and temperature  - Assess for signs of decreased coronary artery perfusion - ex. Angina  - Evaluate fluid balance,  assess for edema, trend weights  Outcome: Progressing  Goal: Absence of cardiac arrhythmias or at baseline  Description: INTERVENTIONS:  - Continuous cardiac monitoring, monitor vital signs, obtain 12 lead EKG if indicated  - Evaluate effectiveness of antiarrhythmic and heart rate control medications as ordered  - Initiate emergency measures for life threatening arrhythmias  - Monitor electrolytes and administer replacement therapy as ordered  Outcome: Progressing     Problem: SAFETY ADULT - FALL  Goal: Free from fall injury  Description: INTERVENTIONS:  - Assess pt frequently for physical needs  - Identify cognitive and physical deficits and behaviors that affect risk of falls.  - [place] fall precautions as indicated by assessment.  - Educate pt/family on patient safety including physical limitations  - Instruct pt to call for assistance with activity based on assessment  - Modify environment to reduce risk of injury  - Provide assistive devices as appropriate  - Consider OT/PT consult to assist with strengthening/mobility  - Encourage toileting schedule  Outcome: Progressing     Problem: NEUROLOGICAL - ADULT  Goal: Achieves stable or improved neurological status  Description: INTERVENTIONS  - Assess for and report changes in neurological status  - Initiate measures to prevent increased intracranial pressure  - Maintain blood pressure and fluid volume within ordered parameters to optimize cerebral perfusion and minimize risk of hemorrhage  - Monitor temperature, glucose, and sodium. Initiate appropriate interventions as ordered  Outcome: Progressing

## 2024-08-06 NOTE — DISCHARGE INSTRUCTIONS
Sometimes managing your health at home requires assistance.  The [facility] team has recognized your preference to use [facility].  They can be reached by phone at [phone].  The fax number for your reference is [phone].  A representative from the home health agency will contact you or your family to schedule your first visit.

## 2024-08-06 NOTE — PROGRESS NOTES
[facility]   Progress Note  -  David Silvestre Patient Status:  Observation    2/15/1940 MRN [id number]   Location [facility] 3W/SW Attending Jailene Galindo MD   Hosp Day # 0 PCP Rome Morgan MD     PCP: Rome Morgan MD      SEE ATTENDING NOTE AT BOTTOM OF PAGE    Is this a shared or split note between Advanced Practice Provider and Physician? Yes    Assessment and Plan:    his is a 84-year-old male with history of coronary artery disease status post CABG  then PCI in , CKD, hypertension, hyperlipidemia, presents to the hospital for evaluation of multiple falls/syncope/presyncope attributed to hayden with orthostasis s/p IVF. Despite fluid hesitation patient continues to have issues with orthostasis and baseline high blood pressures at this time cardiology is recommending to allow permissive hypertension in the setting of orthostasis. Code stroke called with dysarthria/confusion/facial droop, seen by neurology with negative CTA brain and MRI, pt being tx for possible tia vs encephalopathy, will need neurology follow up. . Plans for outpt zio monitor,  bp check and carotid US. [facility] to follow pt at discharge. Plans for home on Thursday with appointments for pcp, cards and neurology, below for details      Falls/Syncope/Pre Syncope w/ Orthostasic Hypotension   -EKG negative for acute ischemia. Troponin normal  -Echo with 50% with WMA, grade1 DD, mild MR and mild TR  -bp meds adjusted, per cards want pt to have higher bp in setting of orthostasis  -s/p IVF  -thigh high KAHLIL hose   -outpt bp check and  zio monitor  -plans for HHC at discharge, daughter will be home on Wednesday evening but there is NP neighbor next door      ? TIA   Encephalopathy  -code stroke called during admission  -CTA brain and neck- no acute infarct/hemorrhage and B/L carotids with less than 50%  -MRI brain negative for acute process, mild microvascular white matter changes  -vitamin b and copper level  pending  -negative for syphilis  -TSH normal  -EEG with global encephalopathy, no seizures   -risk factor modification  -continue zetia (consider PCSK9 vs alternate statin), plavix and ASA  -outpt zio monitor with cards  -follow up with neurology as outpt with assessment of cognitive/memory-Dr Cali VAUGHN on CKD Stage 3-now back at baseline   -baseline cr 1.4-1.6, admission Cr 2 now 1.4  -s/p IVF    Pre DM  -A1C 5.9     Carotid Disease- B/L  -CTA neck with less than 50% ICA stenosis   -outpt carotid US     CAD S/P CABG and PCI  HL-statin intolerant  HTN  -continue asa and zetia   -home toprol and procardia stopped, now on coreg  -? PCSK9 vs alternate statin, defer to usual cardiologist  -follow up BP check with cards  -follow up with Dr Means 8/21     MA/MOLLY Reach  -ER Visits 2024: 1  -Admissions 2024: 1  -Re- Entry: no  -Consults: cards and neurology   -[facility]eds[facility]   -Appointments: [ x] PCP Rome Morgan MD - 8/9       FEN  -lytes in am  -diet-cardiac     Prophy  -SCD  -heparin    Dispo  -plans for home on Thursday as PT recommending contact guard assist and pt lives with daughter and her family who will be coming in from out of town on Wednesday night at 10pm. He is doing to well to qualify for rehab   -8/6 update given to daughter Harriet via phonehelp   PCP: Rome Morgan MD      Concerns regarding plan of care were discussed with patient. Patient agrees with plan as detailed above. Discussed plan of care with Dr. Galindo     Note: This chart was prepared using voice recognition software and may contain unintended word substitution errors.      Carin Márquez RN, NP   [facility] Hospitalist Team  Contact via [employer] and [employer] (Check Availability)  8/6/2024    SUBJECTIVE:   Sitting in the chair.  He states he feels unchanged from yesterday.  He worked with physical therapy and his systolic blood pressure was 190 lying and dropped to 130 with standing.  Spoke with the physical  therapist who recommended contact-guard assist for discharge and unfortunately patient would be home alone as his daughter and her family are out of town and not expected back until Wednesday evening.      OBJECTIVE:   Blood pressure 130/82, pulse 56, temperature 98.1 °F (36.7 °C), temperature source Oral, resp. rate 18, weight 172 lb 6.4 oz (78.2 kg), SpO2 96%.    GENERAL: no apparent distress  NEURO: A/A Ox3  RESP: non labored, CTA  CARDIO: Regular, no murmur  ABD: soft, NT, ND, BS+  EXTREMITIES: no edema    DIAGNOSTIC DATA:   Labs:     Recent Labs   Lab 08/01/24 1920 08/02/24  0631 08/02/24  1454   WBC 4.7 3.4* 3.0*   HGB 12.4* 11.5* 12.1*   MCV 92.0 93.1 93.4   .0 157.0 161.0   INR  --   --  1.06       Recent Labs   Lab 08/01/24 1920 08/02/24  0631 08/02/24  1454    141 140   K 4.2 4.3 4.5    111 110   CO2 22.0 24.0 25.0   BUN 26* 21 20   CREATSERUM 2.01* 1.64* 1.42*   CA 8.8 9.0 9.8   MG 2.2  --   --    * 94 86       Recent Labs   Lab 08/02/24  1454   ALT 13   AST 17   ALB 4.2       Recent Labs   Lab 08/05/24  0607 08/05/24  1246 08/05/24  1751 08/05/24  2359 08/06/24  0644   PGLU 99 105* 95 107* 92       No results for input(s): \"TROP\" in the last 168 hours.        MEDICATIONS       lipase-protease-amylase (Lip-Prot-Amyl)  10,000 Units Oral See Admin Instructions    sodium chloride  1,000 mL Intravenous Once    carvedilol  6.25 mg Oral BID with meals    clopidogrel  75 mg Oral Daily    aspirin  81 mg Oral Daily    ezetimibe  10 mg Oral Nightly    pantoprazole  40 mg Oral BID    heparin  5,000 Units Subcutaneous Q8H [facility]         glycerin-hypromellose-    acetaminophen **OR** acetaminophen    ondansetron    metoclopramide    ibuprofen    Carin Márquez NP      IMAGING     No results found.      SEE ATTENDING NOTE BELOW:

## 2024-08-06 NOTE — PLAN OF CARE
No complaints overnight. Neuros q4 with no acute changes. A/ox4.  Problem: Patient Centered Care  Goal: Patient preferences are identified and integrated in the patient's plan of care  Description: Interventions:  - What would you like us to know as we care for you?   - Provide timely, complete, and accurate information to patient/family  - Incorporate patient and family knowledge, values, beliefs, and cultural backgrounds into the planning and delivery of care  - Encourage patient/family to participate in care and decision-making at the level they choose  - Honor patient and family perspectives and choices  Outcome: Progressing     Problem: Patient/Family Goals  Goal: Patient/Family Long Term Goal  Description: Patient's Long Term Goal:  Interventions  - See additional Care Plan goals for specific interventions  Outcome: Progressing  Goal: Patient/Family Short Term Goal  Description: Patient's Short Term Goal:   Interventions:  - See additional Care Plan goals for specific interventions  Outcome: Progressing     Problem: CARDIOVASCULAR - ADULT  Goal: Maintains optimal cardiac output and hemodynamic stability  Description: INTERVENTIONS:  - Monitor vital signs, rhythm, and trends  - Monitor for bleeding, hypotension and signs of decreased cardiac output  - Evaluate effectiveness of vasoactive medications to optimize hemodynamic stability  - Monitor arterial and/or venous puncture sites for bleeding and/or hematoma  - Assess quality of pulses, skin color and temperature  - Assess for signs of decreased coronary artery perfusion - ex. Angina  - Evaluate fluid balance, assess for edema, trend weights  Outcome: Progressing  Goal: Absence of cardiac arrhythmias or at baseline  Description: INTERVENTIONS:  - Continuous cardiac monitoring, monitor vital signs, obtain 12 lead EKG if indicated  - Evaluate effectiveness of antiarrhythmic and heart rate control medications as ordered  - Initiate emergency measures for life  threatening arrhythmias  - Monitor electrolytes and administer replacement therapy as ordered  Outcome: Progressing     Problem: SAFETY ADULT - FALL  Goal: Free from fall injury  Description: INTERVENTIONS:  - Assess pt frequently for physical needs  - Identify cognitive and physical deficits and behaviors that affect risk of falls.  - [place] fall precautions as indicated by assessment.  - Educate pt/family on patient safety including physical limitations  - Instruct pt to call for assistance with activity based on assessment  - Modify environment to reduce risk of injury  - Provide assistive devices as appropriate  - Consider OT/PT consult to assist with strengthening/mobility  - Encourage toileting schedule  Outcome: Progressing     Problem: NEUROLOGICAL - ADULT  Goal: Achieves stable or improved neurological status  Description: INTERVENTIONS  - Assess for and report changes in neurological status  - Initiate measures to prevent increased intracranial pressure  - Maintain blood pressure and fluid volume within ordered parameters to optimize cerebral perfusion and minimize risk of hemorrhage  - Monitor temperature, glucose, and sodium. Initiate appropriate interventions as ordered  Outcome: Progressing

## 2024-08-07 LAB
COPPER: 68 UG/DL
VITAMIN B1 WHOLE BLD: 89.9 NMOL/L

## 2024-08-07 NOTE — DISCHARGE SUMMARY
[facility] Hospitalist Discharge Summary   Patient ID:  David Silvestre  [id number]  84 year old  2/15/1940    Admit date: 8/1/2024  Discharge date: 8/9/2024    Primary Care Physician: Rome Morgan MD   Attending Physician: Jailene Galindo MD   Consults:   Consultants         Provider   Role Specialty     Evita Byrnes DO      Consulting Physician NEUROLOGY     Alberto Mancia DO      Consulting Physician NEUROLOGY     Tab Adame MD      Consulting Physician CARDIOLOGY            Hospital Discharge Diagnoses:   Syncope, near  ----See D/C Summary for further Dx    Risk of Readmission Lace+ Score: 71  59-90 High Risk  29-58 Medium Risk  0-28   Low Risk.    TCM Follow-Up Recommendation:  LACE > 58: High Risk of readmission after discharge from the hospital.    Please note that only [facility] and [facility] patients enrolled in the Medicare ACO, BCBS ACO and BCBS HMOs will be handled by the [facility] Care Management team.  For all other patients, please follow usual protocol for discharge care transition.    Reason for admission  Per H/P Dated 8/2/2024 by Dr Ramsey   This is a 84-year-old male with history of coronary artery disease status post CABG 2014 then PCI in 2023, CKD, hypertension, hyperlipidemia, presents to the hospital for evaluation of multiple falls.  The patient lives at home with family however they are out of town on vacation.  The patient states the last few days he noticed that he has had a few falls at home.  Yesterday he states he could not get up from the fall and EMS was called.  Yesterday morning he felt like he could barely sit up in a chair.  The patient denies any chest pain, palpitations, or trouble breathing.  He has had multiple changes to his blood pressure medicines in the recent past.  He has noted symptoms of lightheadedness even as an outpatient and some medications were stopped.  He also tells me that he feels like his speech is not normal.  No trouble swallowing.  No focal  weakness.  He denies any paresthesias.       Hospital Course:     his is a 84-year-old male with history of coronary artery disease status post CABG 2014 then PCI in 2023, CKD, hypertension, hyperlipidemia, presents to the hospital for evaluation of multiple falls/syncope/presyncope attributed to hayden with orthostasis s/p IVF. Despite fluid hesitation patient continues to have issues with orthostasis and baseline high blood pressures at this time cardiology is recommending to allow permissive hypertension in the setting of orthostasis. Code stroke called with dysarthria/confusion/facial droop, seen by neurology with negative CTA brain and MRI, pt being tx for possible tia vs encephalopathy, will need neurology follow up. Plans for outpt zio monitor,  bp check/Work up and carotid US. Prior to discharge pt with possible left eye conjunctivitis likely viral but will tx for bacterial just in case, pt to call pcp or go to [facility] this weekend if things change.  Pt discharged with [facility] and appointments for pcp 8/14, cards 8/21 and neurology 9/3, below for details      Falls/Syncope/Pre Syncope  HTN w/Orthostasic Hypotension   -EKG negative for acute ischemia. Troponin normal  -Echo with 50% with WMA, grade1 DD, mild MR and mild TR  -bp meds adjusted, per cards want pt to have higher bp in setting of orthostasis  -s/p IVF  -Patient dose not want to wear abdominal binder and thigh-high JAYSON hose, he prefers knee high jayson hose   -encouraged ambulation  -outpt ambulatory bp check  -consideration for secondary work up of labile bp  -consideration for zio monitor  -cards appt 8/21     Global Amnesia/? TIA/Encephalopathy  -code stroke called during admission  -CTA brain and neck- no acute infarct/hemorrhage and B/L carotids with less than 50%  -MRI brain negative for acute process, mild microvascular white matter changes  -vitamin b normal and copper level low end of normal   -negative for syphilis  -TSH normal  -EEG with  global encephalopathy, no seizures   -risk factor modification as per cards  -continue zetia (consider PCSK9 vs alternate statin), plavix and ASA  -follow up with neurology as outpt with assessment of cognitive/memory-Dr Leiva 9/3     JOHANA on CKD Stage 3-resolved   -baseline cr 1.4-1.6, admission Cr 2 now 1.47  -s/p IVF    Left Eye Conjunctivitis  -clear drainage  -concerns for viral syndrome  -will tx for bacterial conjunctivits just in case  -family notified to contact pcp or go [facility] this weekend if any changes     Pre DM  -A1C 5.9     Carotid Disease- B/L  -CTA neck with less than 50% ICA stenosis   -outpt carotid US     CAD S/P CABG and PCI  HL-statin intolerant  HTN  -continue asa and zetia   -home toprol and procardia stopped, now on coreg  -? PCSK9 vs alternate statin, defer to usual cardiologist  -follow up BP check with cards  -follow up with Dr Means 8/21     MA/MOLLY Reach  -ER Visits 2024: 1  -Admissions 2024: 1  -Re- Entry: no  -Consults: cards and neurology   -[facility]charge Needs:[facility]   -Appointments: [ x] PCP Rome Morgan MD - 8/14    EXAM:   GENERAL: no apparent distress  NEURO: A/A Ox3  RESP: non labored, CTA  CARDIO: Regular, no murmur  ABD: soft, NT, ND, BS+  EXTREMITIES: no edema    Discharge Instructions     Medication List        START taking these medications      carvedilol 6.25 MG Tabs  Commonly known as: Coreg  Take 1 tablet (6.25 mg total) by mouth 2 (two) times daily with meals.            CONTINUE taking these medications      aspirin 81 MG Tbec     clopidogrel 75 MG Tabs  Commonly known as: Plavix  Take 1 tablet (75 mg total) by mouth daily.     COENZYME Q-10 OR     ezetimibe 10 MG Tabs  Commonly known as: Zetia  Take 1 tablet (10 mg total) by mouth nightly.     Magnesium 500 MG Tabs     nitroglycerin 0.4 MG Subl  Commonly known as: Nitrostat  Place 1 tablet (0.4 mg total) under the tongue as needed for Chest pain.     omega-3 fatty acids 1000 MG Caps  Commonly known as: Fish Oil      One-Daily Multi Vitamins Tabs     Pancrelipase (Lip-Prot-Amyl) 43027-92240 units Cpep     pantoprazole 40 MG Tbec  Commonly known as: Protonix     Saw Palmetto 450 MG Caps     Vitamin C 500 MG Tabs  Commonly known as: VITAMIN C            STOP taking these medications      LORazepam 1 MG Tabs  Commonly known as: Ativan     metoprolol succinate ER 25 MG Tb24  Commonly known as: Toprol XL     NIFEdipine ER 30 MG Tb24  Commonly known as: Adalat CC               Where to Get Your Medications        These medications were sent to [id number] - [place], [place] - [address] AT [facility], [phone], [phone]  [address], [facility] [place] [address]      Hours: 24-hours Phone: [phone]   carvedilol 6.25 MG Tabs       Code Status: Full Code    Important follow up:   Follow-up Information       Rome Morgan MD Follow up on 8/14/2024.    Specialty: Internal Medicine  Why: 8/14 at 11:30 am 2:30  Contact information:  [address] [facility] [address]  [facility] [id number] [phone]               Evan Means,  Follow up on 8/21/2024.    Specialty: Cardiovascular Diseases  Why: 8/21 at 1:40pm  Contact information:  [address]  [facility] [address] [phone]                           Disposition: home  Discharged Condition: stable  =========================================================================================================================    I Reconciled current and discharge medications on day of discharge  Patient had opportunity to ask questions and state understand and agree with therapeutic plan as outlined    Total Time Coordinating Care: greater than 30 minutes  Is this a shared or split note between Advanced Practice Provider and Physician? Yes    Note: This chart was prepared using voice recognition software and may contain unintended word substitution errors.     Carin Márquez RN, NP   [facility]    8/9/2024      SEE ATTENDING NOTE BELOW      Patient examined and assessed independently. Agree with above APN assessment.      Hospital Course: Mr. Silvestre is an 84-year-old male with history of coronary artery disease status post CABG 2014 then PCI in 2023, CKD, hypertension, hyperlipidemia, presents to the hospital for evaluation of multiple falls/syncope/presyncope attributed to hayden with orthostasis s/p IVF. Despite fluid hesitation patient continues to have issues with orthostasis and baseline high blood pressures at this time cardiology is recommending to allow permissive hypertension in the setting of orthostasis. Code stroke called with dysarthria/confusion/facial droop, seen by neurology with negative CTA brain and MRI, pt being tx for possible tia vs encephalopathy, will need neurology follow up. Plans for outpt zio monitor,  bp check/Work up and carotid US. Prior to discharge pt with possible left eye conjunctivitis likely viral but will tx for bacterial just in case, pt to call pcp or go to [facility] this weekend if things change.  Pt discharged with [facility] and appointments for pcp 8/14, cards 8/21 and neurology 9/3, below for details      Objective  /69 (BP Location: Right arm)   Pulse 55   Temp 97.5 °F (36.4 °C) (Oral)   Resp 18   Wt 172 lb 6.4 oz (78.2 kg)   SpO2 96%   BMI 23.38 kg/m²        Exam:  GEN: elderly male in NAD  HEENT: EOMI  Pulm: CTAB, no crackles or wheezes  CV: RRR, no murmurs  ABD: Soft, non-tender, non-distended, +BS  SKIN: warm, dry  EXT: no edema     Assessment/Plan     Mr. Silvestre is an 84-year-old male with history of coronary artery disease status post CABG 2014 then PCI in 2023, CKD, hypertension, hyperlipidemia, presents to the hospital for evaluation of multiple falls/syncope/presyncope attributed to hayden with orthostasis s/p IVF. Despite fluid hesitation patient continues to have issues with orthostasis and baseline high blood pressures at this time cardiology  is recommending to allow permissive hypertension in the setting of orthostasis. Code stroke called with dysarthria/confusion/facial droop, seen by neurology with negative CTA brain and MRI, pt being tx for possible tia vs encephalopathy, will need neurology follow up. Plans for outpt zio monitor,  bp check and carotid US. [facility] to follow pt at discharge.  Plan was to discharge today however patient had  this morning, plans for home on Friday if bp stable for 24 hours, norvasc was added. Pt has appointments for pcp 8/14, cards 8/21 and neurology 9/3, below for details      Falls/Syncope/Pre Syncope  HTN with Orthostasic Hypotension    Global Amnesia/? TIA/Encephalopathy  JOHANA on CKD Stage 3-now back at baseline   Carotid Disease- B/L  CAD S/P CABG and PCI  HL-statin intolerant  HTN     Plan:  - d/w cardiology, plan to allow permissive supine HTN to avoid orthostatic hypotension  - continue KAHLIL hose, does not want to wear abdominal binder  - plan for discharge home on carvedilol only for BP control        Rest as above.     Cassidy Bob MD  [facility] hospitalist

## 2024-08-07 NOTE — PLAN OF CARE
Problem: Patient Centered Care  Goal: Patient preferences are identified and integrated in the patient's plan of care  Description: Interventions:  - What would you like us to know as we care for you?   Problem: Patient/Family Goals  Goal: Patient/Family Long Term Goal  Description: Patient's Long Term Goal: discharge home    Interventions:  - Monitor vital signs  - Neurological assessments  - Orthostatics  - Ambulate as tolerated  - See additional Care Plan goals for specific interventions  Outcome: Progressing  Goal: Patient/Family Short Term Goal  Description: Patient's Short Term Goal: feel better    Interventions:   - Ambulate as tolerated  - Orthostatic education  - Continue ADLs  - See additional Care Plan goals for specific interventions  Outcome: Progressing     Problem: CARDIOVASCULAR - ADULT  Goal: Maintains optimal cardiac output and hemodynamic stability  Description: INTERVENTIONS:  - Monitor vital signs, rhythm, and trends  - Monitor for bleeding, hypotension and signs of decreased cardiac output  - Evaluate effectiveness of vasoactive medications to optimize hemodynamic stability  - Monitor arterial and/or venous puncture sites for bleeding and/or hematoma  - Assess quality of pulses, skin color and temperature  - Assess for signs of decreased coronary artery perfusion - ex. Angina  - Evaluate fluid balance, assess for edema, trend weights  Outcome: Progressing  Goal: Absence of cardiac arrhythmias or at baseline  Description: INTERVENTIONS:  - Continuous cardiac monitoring, monitor vital signs, obtain 12 lead EKG if indicated  - Evaluate effectiveness of antiarrhythmic and heart rate control medications as ordered  - Initiate emergency measures for life threatening arrhythmias  - Monitor electrolytes and administer replacement therapy as ordered  Outcome: Progressing     Problem: SAFETY ADULT - FALL  Goal: Free from fall injury  Description: INTERVENTIONS:  - Assess pt frequently for physical  needs  - Identify cognitive and physical deficits and behaviors that affect risk of falls.  - [place] fall precautions as indicated by assessment.  - Educate pt/family on patient safety including physical limitations  - Instruct pt to call for assistance with activity based on assessment  - Modify environment to reduce risk of injury  - Provide assistive devices as appropriate  - Consider OT/PT consult to assist with strengthening/mobility  - Encourage toileting schedule  Outcome: Progressing     Problem: NEUROLOGICAL - ADULT  Goal: Achieves stable or improved neurological status  Description: INTERVENTIONS  - Assess for and report changes in neurological status  - Initiate measures to prevent increased intracranial pressure  - Maintain blood pressure and fluid volume within ordered parameters to optimize cerebral perfusion and minimize risk of hemorrhage  - Monitor temperature, glucose, and sodium. Initiate appropriate interventions as ordered  Outcome: Progressing     - Provide timely, complete, and accurate information to patient/family  - Incorporate patient and family knowledge, values, beliefs, and cultural backgrounds into the planning and delivery of care  - Encourage patient/family to participate in care and decision-making at the level they choose  - Honor patient and family perspectives and choices  Outcome: Progressing

## 2024-08-07 NOTE — PROGRESS NOTES
[facility]  part of [facility]    Cardiology Progress Note    Dvaid Silvestre Patient Status:  Observation    2/15/1940 MRN [id number]   Location [facility] 3W/SW Attending Jailene Galindo MD   Hosp Day # 0 PCP Rome Morgan MD       Impression/Plan:  84 year old male presenting with:     Syncope and Presyncope, +orthostatics  - neuro workup ongoing, MRI brain negative for acute process.   - EKG, echo with no acute changes   CAD s/p CABG 5v   s/p JACOB  SVG RCA  JOHANA on CKD  HL, can only tolerate 1-2 days per week on statin  HTN, has had recent med changes     - Continue present BP meds for now (carvedilol 6.25mg bid); will need to allow permissive supine HTN to avoid orthostatic hypotension.   - Daily orthostatics  - Cont asa, plavix, zetia  - Alternate statin/PCSK9i to be determined as outpatient  - PT/OT  - Monitor rhythm on telemetry; consider outpatient monitor on discharge  - Will follow     Subjective: No events overnight.  Today patient resting comfortably, no apparent distress.    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)    Coronary artery disease involving coronary bypass graft of native heart without angina pectoris    Atherosclerosis of aorta (HCC)    Ischemic cardiomyopathy    Diastolic dysfunction    Hypertensive heart and chronic kidney disease with heart failure and stage 1 through stage 4 chronic kidney disease, or unspecified chronic kidney disease (HCC)    Brain atrophy (HCC)    Anemia, unspecified type    Peptic ulcer disease    Stage 3 chronic kidney disease, unspecified whether  stage 3a or 3b CKD (HCC)    Mood disorder (HCC)    Syncope, near    Dehydration    Memory deficits    Orthostatic intolerance    Recurrent falls    TIA (transient ischemic attack)    Toxic metabolic encephalopathy    Cerebral atrophy (HCC)       Objective:   Temp: 98 °F (36.7 °C)  Pulse: 58  Resp: 18  BP: 125/73    Intake/Output:     Intake/Output Summary (Last 24 hours) at 8/7/2024 0922  Last data filed at 8/7/2024 0716  Gross per 24 hour   Intake 740 ml   Output 1250 ml   Net -510 ml       Last 3 Weights   08/07/24 0506 172 lb 8 oz (78.2 kg)   08/06/24 0510 172 lb 6.4 oz (78.2 kg)   08/05/24 0650 173 lb 11.2 oz (78.8 kg)   08/04/24 0550 174 lb 8 oz (79.2 kg)   08/01/24 2353 173 lb 14.4 oz (78.9 kg)   01/22/24 1247 160 lb (72.6 kg)   03/16/22 0806 162 lb 11.2 oz (73.8 kg)       Tele: NSR, SB, PACs    Physical Exam:    General: Alert and oriented x 3. No apparent distress. No respiratory or constitutional distress.  HEENT: Normocephalic, anicteric sclera, neck supple, no thyromegaly or adenopathy.  Neck: No JVD, carotids 2+, no bruits.  Cardiac: Regular rate and rhythm. S1, S2 normal. No murmur, pericardial rub, S3, thrill, heave or extra cardiac sounds.  Lungs: Clear without wheezes, rales, rhonchi or dullness.  Normal excursions and effort.  Abdomen: Soft, non-tender. No organosplenomegally, mass or rebound, BS-present.  Extremities: Without clubbing, cyanosis or edema.  Peripheral pulses are 2+.  Neurologic: Alert and oriented, normal affect. No motor or coordinational deficit.  Skin: Warm and dry.     Laboratory/Data:    Labs:         Recent Labs   Lab 08/01/24  1920 08/02/24  0631 08/02/24  1454   WBC 4.7 3.4* 3.0*   HGB 12.4* 11.5* 12.1*   MCV 92.0 93.1 93.4   .0 157.0 161.0   INR  --   --  1.06       Recent Labs   Lab 08/01/24  1920 08/02/24  0631 08/02/24  1454    141 140   K 4.2 4.3 4.5    111 110   CO2 22.0 24.0 25.0   BUN 26* 21 20   CREATSERUM 2.01* 1.64* 1.42*   CA 8.8 9.0 9.8   MG  2.2  --   --    * 94 86       Recent Labs   Lab 08/02/24  1454   ALT 13   AST 17   ALB 4.2       No results for input(s): \"TROP\" in the last 168 hours.    Allergies:   Allergies   Allergen Reactions    Thorazine [Chlorpromazine] CONFUSION and OTHER (SEE COMMENTS)     Per pt cva symptoms    Crestor [Rosuvastatin] PAIN and MYALGIA     Joint pain    Nexletol [Bempedoic Acid] OTHER (SEE COMMENTS)     Reportedly knee pain and variable BP fluctuation     Pravachol [Pravastatin] PAIN and MYALGIA     Joint pain     Repatha [Evolocumab] DIARRHEA and MYALGIA          Zocor [Simvastatin] PAIN and MYALGIA     joint    Imdur [Isosorbide] OTHER (SEE COMMENTS)     Head ache    Praluent [Alirocumab] DIARRHEA    Statins OTHER (SEE COMMENTS)     Per patient after 2-4 days the medication affects knees and patient is unable to walk.    Sulfa Antibiotics RASH       Medications:  Current Facility-Administered Medications   Medication Dose Route Frequency    glycerin-hypromellose- (Artificial Tears) 0.2-0.2-1 % ophthalmic solution 1 drop  1 drop Both Eyes QID PRN    pancrelipase (Lip-Prot-Amyl) (Zenpep) DR particles cap 10,000 Units  10,000 Units Oral See Admin Instructions    sodium chloride 0.9 % IV bolus 1,000 mL  1,000 mL Intravenous Once    carvedilol (Coreg) tab 6.25 mg  6.25 mg Oral BID with meals    acetaminophen (Tylenol) tab 650 mg  650 mg Oral Q4H PRN    Or    acetaminophen (Tylenol) rectal suppository 650 mg  650 mg Rectal Q4H PRN    ondansetron (Zofran) 4 MG/2ML injection 4 mg  4 mg Intravenous Q6H PRN    metoclopramide (Reglan) 5 mg/mL injection 5 mg  5 mg Intravenous Q8H PRN    clopidogrel (Plavix) tab 75 mg  75 mg Oral Daily    ibuprofen (Motrin) tab 400 mg  400 mg Oral TID PRN    aspirin DR tab 81 mg  81 mg Oral Daily    ezetimibe (Zetia) tab 10 mg  10 mg Oral Nightly    pantoprazole (Protonix) DR tab 40 mg  40 mg Oral BID    heparin (Porcine) 5000 UNIT/ML injection 5,000 Units  5,000 Units Subcutaneous Q8H  [facility]       Rafi Rhoades MD  8/7/2024  9:22 AM

## 2024-08-07 NOTE — PLAN OF CARE
Patient alert and oriented. Ambulating with standby assist. Q shift neuro check and daily NIH done. Daughter updated on plan of care. Plan to discharge home tomorrow with home health when medically clear and family home from vacation. Resting in bed with fall precautions in place and call light in reach.     Problem: Patient Centered Care  Goal: Patient preferences are identified and integrated in the patient's plan of care  Description: Interventions:  - What would you like us to know as we care for you? I live at home with my family and grandchildren.   - Provide timely, complete, and accurate information to patient/family  - Incorporate patient and family knowledge, values, beliefs, and cultural backgrounds into the planning and delivery of care  - Encourage patient/family to participate in care and decision-making at the level they choose  - Honor patient and family perspectives and choices  Outcome: Progressing     Problem: Patient/Family Goals  Goal: Patient/Family Long Term Goal  Description: Patient's Long Term Goal: discharge home    Interventions:  - Monitor vital signs  - Neurological assessments  - Orthostatics  - Ambulate as tolerated  - See additional Care Plan goals for specific interventions  Outcome: Progressing  Goal: Patient/Family Short Term Goal  Description: Patient's Short Term Goal: feel better    Interventions:   - Ambulate as tolerated  - Orthostatic education  - Continue ADLs  - See additional Care Plan goals for specific interventions  Outcome: Progressing     Problem: CARDIOVASCULAR - ADULT  Goal: Maintains optimal cardiac output and hemodynamic stability  Description: INTERVENTIONS:  - Monitor vital signs, rhythm, and trends  - Monitor for bleeding, hypotension and signs of decreased cardiac output  - Evaluate effectiveness of vasoactive medications to optimize hemodynamic stability  - Monitor arterial and/or venous puncture sites for bleeding and/or hematoma  - Assess quality of  pulses, skin color and temperature  - Assess for signs of decreased coronary artery perfusion - ex. Angina  - Evaluate fluid balance, assess for edema, trend weights  Outcome: Progressing  Goal: Absence of cardiac arrhythmias or at baseline  Description: INTERVENTIONS:  - Continuous cardiac monitoring, monitor vital signs, obtain 12 lead EKG if indicated  - Evaluate effectiveness of antiarrhythmic and heart rate control medications as ordered  - Initiate emergency measures for life threatening arrhythmias  - Monitor electrolytes and administer replacement therapy as ordered  Outcome: Progressing     Problem: SAFETY ADULT - FALL  Goal: Free from fall injury  Description: INTERVENTIONS:  - Assess pt frequently for physical needs  - Identify cognitive and physical deficits and behaviors that affect risk of falls.  - [place] fall precautions as indicated by assessment.  - Educate pt/family on patient safety including physical limitations  - Instruct pt to call for assistance with activity based on assessment  - Modify environment to reduce risk of injury  - Provide assistive devices as appropriate  - Consider OT/PT consult to assist with strengthening/mobility  - Encourage toileting schedule  Outcome: Progressing     Problem: NEUROLOGICAL - ADULT  Goal: Achieves stable or improved neurological status  Description: INTERVENTIONS  - Assess for and report changes in neurological status  - Initiate measures to prevent increased intracranial pressure  - Maintain blood pressure and fluid volume within ordered parameters to optimize cerebral perfusion and minimize risk of hemorrhage  - Monitor temperature, glucose, and sodium. Initiate appropriate interventions as ordered  Outcome: Progressing

## 2024-08-07 NOTE — PROGRESS NOTES
[facility] AND CARE   Progress Note  -  David Silvestre Patient Status:  Observation    2/15/1940 MRN [id number]   Location [facility] 3W/SW Attending Jailene Galindo MD   Hosp Day # 0 PCP Rome Morgan MD     PCP: Rome Morgan MD      SEE ATTENDING NOTE AT BOTTOM OF PAGE    Is this a shared or split note between Advanced Practice Provider and Physician? Yes    Assessment and Plan:    This is a 84-year-old male with history of coronary artery disease status post CABG  then PCI in , CKD, hypertension, hyperlipidemia, presents to the hospital for evaluation of multiple falls/syncope/presyncope attributed to hayden with orthostasis s/p IVF. Despite fluid hesitation patient continues to have issues with orthostasis and baseline high blood pressures at this time cardiology is recommending to allow permissive hypertension in the setting of orthostasis. Code stroke called with dysarthria/confusion/facial droop, seen by neurology with negative CTA brain and MRI, pt being tx for possible tia vs encephalopathy, will need neurology follow up. . Plans for outpt zio monitor,  bp check and carotid US. [facility] to follow pt at discharge. Plans for home on Thursday with appointments for pcp , cards  and neurology 9/3, below for details      Falls/Syncope/Pre Syncope w/ Orthostasic Hypotension   -EKG negative for acute ischemia. Troponin normal  -Echo with 50% with WMA, grade1 DD, mild MR and mild TR  -bp meds adjusted, per cards want pt to have higher bp in setting of orthostasis  -s/p IVF  -thigh high KAHLIL hose   -outpt bp check and  zio monitor     ? TIA   Encephalopathy  -code stroke called during admission  -CTA brain and neck- no acute infarct/hemorrhage and B/L carotids with less than 50%  -MRI brain negative for acute process, mild microvascular white matter changes  -vitamin b and copper level pending  -negative for syphilis  -TSH normal  -EEG with global encephalopathy, no seizures   -risk  factor modification  -continue zetia (consider PCSK9 vs alternate statin), plavix and ASA  -outpt zio monitor with cards  -follow up with neurology as outpt with assessment of cognitive/memory-Dr Leiva 9/3     JOHANA on CKD Stage 3-now back at baseline   -baseline cr 1.4-1.6, admission Cr 2 now 1.4  -s/p IVF    Pre DM  -A1C 5.9     Carotid Disease- B/L  -CTA neck with less than 50% ICA stenosis   -outpt carotid US     CAD S/P CABG and PCI  HL-statin intolerant  HTN  -continue asa and zetia   -home toprol and procardia stopped, now on coreg  -? PCSK9 vs alternate statin, defer to usual cardiologist  -follow up BP check with cards  -follow up with Dr Means 8/21     MA/MOLLY Reach  -ER Visits 2024: 1  -Admissions 2024: 1  -Re- Entry: no  -Consults: cards and neurology   -Discharge Needs:[facility]   -Appointments: [ x] PCP Rome Morgan MD - 8/9       FEN  -lytes in am  -diet-cardiac     Prophy  -SCD  -heparin    Dispo  -plans for home tomorrow in am  PCP: Rome Morgan MD      Concerns regarding plan of care were discussed with patient. Patient agrees with plan as detailed above. Discussed plan of care with Dr. Galindo     Note: This chart was prepared using voice recognition software and may contain unintended word substitution errors.      Carin Márquez RN, NP   [facility] and Care Hospitalist Team  Contact via Perfect Serve and Bubble (Check Availability)    SUBJECTIVE:   Still feels \"fuzzy\" in the head. No other complaints `    OBJECTIVE:   Blood pressure 154/84, pulse 72, temperature 98 °F (36.7 °C), temperature source Oral, resp. rate 16, weight 172 lb 8 oz (78.2 kg), SpO2 95%.    GENERAL: no apparent distress  NEURO: A/A Ox3  RESP: non labored, CTA  CARDIO: Regular, no murmur  ABD: soft, NT, ND, BS+  EXTREMITIES: no edema    DIAGNOSTIC DATA:   Labs:     Recent Labs   Lab 08/01/24  1920 08/02/24  0631 08/02/24  1454   WBC 4.7 3.4* 3.0*   HGB 12.4* 11.5* 12.1*   MCV 92.0 93.1 93.4   .0 157.0 161.0   INR  --    --  1.06       Recent Labs   Lab 08/01/24  1920 08/02/24  0631 08/02/24  1454    141 140   K 4.2 4.3 4.5    111 110   CO2 22.0 24.0 25.0   BUN 26* 21 20   CREATSERUM 2.01* 1.64* 1.42*   CA 8.8 9.0 9.8   MG 2.2  --   --    * 94 86       Recent Labs   Lab 08/02/24  1454   ALT 13   AST 17   ALB 4.2       Recent Labs   Lab 08/05/24  0607 08/05/24  1246 08/05/24  1751 08/05/24  2359 08/06/24  0644   PGLU 99 105* 95 107* 92       No results for input(s): \"TROP\" in the last 168 hours.        MEDICATIONS       lipase-protease-amylase (Lip-Prot-Amyl)  10,000 Units Oral See Admin Instructions    sodium chloride  1,000 mL Intravenous Once    carvedilol  6.25 mg Oral BID with meals    clopidogrel  75 mg Oral Daily    aspirin  81 mg Oral Daily    ezetimibe  10 mg Oral Nightly    pantoprazole  40 mg Oral BID    heparin  5,000 Units Subcutaneous Q8H JUSTICE         glycerin-hypromellose-    acetaminophen **OR** acetaminophen    ondansetron    metoclopramide    ibuprofen    Carin Márquez NP      IMAGING     No results found.      SEE ATTENDING NOTE BELOW:

## 2024-08-07 NOTE — PHYSICAL THERAPY NOTE
PHYSICAL THERAPY TREATMENT NOTE - INPATIENT     Room Number: COF14/COF14-A       Presenting Problem: syncope, presyncope  Co-Morbidities : htn, cad. htn    Problem List  Principal Problem:    Syncope, near  Active Problems:    Dehydration    Memory deficits    Orthostatic intolerance    Recurrent falls    TIA (transient ischemic attack)    Toxic metabolic encephalopathy    Cerebral atrophy (HCC)      PHYSICAL THERAPY ASSESSMENT   Patient demonstrates excellent progress this session, goals  remain in progress.      Patient is requiring stand-by assist as a result of the following impairments: medical status and dizziness related to orthostatics .     Patient continues to function near baseline with bed mobility, transfers, and gait.  Next session anticipate patient to progress gait and stair negotiation.  Physical Therapy will continue to follow patient for duration of hospitalization.    Patient continues to benefit from continued skilled PT services: at discharge to promote prior level of function.  Anticipate patient will return home with OP PT.    PLAN  PT Treatment Plan: Bed mobility;Body mechanics;Endurance;Energy conservation;Patient education;Gait training;Strengthening;Transfer training;Balance training  Frequency (Obs): 5x/week    SUBJECTIVE  \"My family is coming home today.\"    OBJECTIVE  Precautions: Cardiac;Bed/chair alarm (orthostatic)    WEIGHT BEARING RESTRICTION  none    PAIN ASSESSMENT   Ratin  Location: denies       BALANCE  Static Sitting: Good  Dynamic Sitting: Good  Static Standing: Fair  Dynamic Standing: Fair -    ACTIVITY TOLERANCE  Pulse: 72  Heart Rate Source: Monitor     BP: 154/84 (sitting, 99/85 standing, 115/73 sitting post)  BP Location: Right arm  BP Method: Automatic  Patient Position: Sitting     O2 WALK  Oxygen Therapy  SPO2% on Room Air at Rest: 97    AM-PAC '6-Clicks' INPATIENT SHORT FORM - BASIC MOBILITY  How much difficulty does the patient currently have...  Patient  Difficulty: Turning over in bed (including adjusting bedclothes, sheets and blankets)?: None   Patient Difficulty: Sitting down on and standing up from a chair with arms (e.g., wheelchair, bedside commode, etc.): None   Patient Difficulty: Moving from lying on back to sitting on the side of the bed?: None   How much help from another person does the patient currently need...   Help from Another: Moving to and from a bed to a chair (including a wheelchair)?: None   Help from Another: Need to walk in hospital room?: A Little   Help from Another: Climbing 3-5 steps with a railing?: A Little     AM-PAC Score:  Raw Score: 22   Approx Degree of Impairment: 20.91%   Standardized Score (AM-PAC Scale): 53.28   CMS Modifier (G-Code):     FUNCTIONAL ABILITY STATUS  Functional Mobility/Gait Assessment  Gait Assistance: Contact guard assist;Supervision  Distance (ft): 200  Assistive Device: None  Pattern: Within Functional Limits  Stairs: Other (comment) (deferred 2/2 lightheadedness with standing)  Supine to Sit: independent  Sit to Supine: independent  Sit to Stand: supervision    Skilled Therapy Provided: Since previous session patient has progressed, tolerating household and community distances without an assistive device. Pt reporting increased lightheadedness not improving with standing activity so stairs deferred at this time, RN aware. Pt able to perform the [place]vine when ambulating.     Patient received seated edge of bed, agreeable to physical therapy. Vital signs monitored as noted above, patient experiencing lightheadedness with standing, orthostatic .     Education with patient provided verbally on physical therapy plan of care. Patient with good carryover during session. Anticipated therapy needs have decreased based on patient's functional progress.      The patient's Approx Degree of Impairment: 20.91% has been calculated based on documentation in the [facility] '6 clicks' Inpatient Daily Activity Short Form.   Research supports that patients with this level of impairment may benefit from no services, however anticipate would benefit from OP PT for dynamic balance training and fall prevention.  Final disposition will be made by interdisciplinary medical team.      Patient End of Session: In bed;With  staff;Needs met;Call light within reach;RN aware of session/findings;All patient questions and concerns addressed    CURRENT GOALS   Goals to be met by: 8/9/24  Patient Goal Patient's self-stated goal is: to return home   Goal #1 Patient is able to demonstrate supine - sit EOB @ level: independent      Goal #1   Current Status  MET 8/7/24   Goal #2 Patient is able to demonstrate transfers Sit to/from Stand at assistance level: modified independent with  LRAD      Goal #2  Current Status  NOT MET/IN PROGRESS    Goal #3 Patient is able to ambulate 100 feet with assist device:  LRAD  at assistance level: independent   Goal #3   Current Status  NOT MET/IN PROGRESS    Goal #4 Patient will negotiate 5 stairs/one curb w/ assistive device and supervision   Goal #4   Current Status  NOT MET/IN PROGRESS    Goal #5 Patient to demonstrate independence with home activity/exercise instructions provided to patient in preparation for discharge.   Goal #5   Current Status  IN PROGRESS/ONGOING    Goal #6     Goal #6  Current Status       Gait Training: 10 minutes      Fabiana Colin, PT, DPT  [facility]  Rehab Services - Physical Therapy  [id number]

## 2024-08-08 LAB
ATRIAL RATE: 56 BPM
P AXIS: 22 DEGREES
P-R INTERVAL: 172 MS
Q-T INTERVAL: 432 MS
QRS DURATION: 92 MS
QTC CALCULATION (BEZET): 416 MS
R AXIS: 24 DEGREES
T AXIS: 7 DEGREES
VENTRICULAR RATE: 56 BPM

## 2024-08-08 PROCEDURE — 99291 CRITICAL CARE FIRST HOUR: CPT | Performed by: HOSPITALIST

## 2024-08-08 RX ORDER — AMLODIPINE BESYLATE 5 MG/1
5 TABLET ORAL ONCE
Status: COMPLETED | OUTPATIENT
Start: 2024-08-08 | End: 2024-08-08

## 2024-08-08 NOTE — DIETARY NOTE
BRIEF DIETITIAN NOTE     Pt re-screened for LOS (length of stay). Found to be at no nutrition risk at this time. Good PO intakes. Weight relatively stable. Please consult RD if further nutrition intervention is needed.     Percent Meals Eaten (last 6 days)       Date/Time Percent Meals Eaten (%)    08/02/24 0948 50 %    08/02/24 1445 --     Percent Meals Eaten (%): patient npo at 08/02/24 1445    08/02/24 1755 100 %    08/03/24 0936 50 %    08/03/24 1456 0 %    08/04/24 0900 100 %    08/04/24 1200 100 %    08/04/24 1900 100 %    08/05/24 1030 100 %    08/05/24 1758 100 %    08/05/24 1859 100 %    08/06/24 1032 100 %    08/06/24 1344 100 %    08/06/24 1838 100 %    08/07/24 1245 100 %    08/07/24 1536 100 %    08/07/24 1907 100 %    08/08/24 1100 100 %             Patient Weight(s) for the past 336 hrs:   Weight   08/08/24 0404 80.2 kg (176 lb 12.8 oz)   08/07/24 0506 78.2 kg (172 lb 8 oz)   08/06/24 0510 78.2 kg (172 lb 6.4 oz)   08/05/24 0650 78.8 kg (173 lb 11.2 oz)   08/04/24 0550 79.2 kg (174 lb 8 oz)   08/01/24 2353 78.9 kg (173 lb 14.4 oz)        Wt Readings from Last 6 Encounters:   08/08/24 80.2 kg (176 lb 12.8 oz)   01/22/24 72.6 kg (160 lb)   03/16/22 73.8 kg (162 lb 11.2 oz)   03/14/22 77.1 kg (170 lb)   02/20/22 72.6 kg (160 lb)   02/06/22 74.8 kg (165 lb)        F/u per protocol or as appropriate.     Salena Keyes RD  Clinical Dietitian   P: [phone]

## 2024-08-08 NOTE — PROGRESS NOTES
[facility] AND CARE   Progress Note  -  David Silvestre Patient Status:  Observation    2/15/1940 MRN [id number]   Location [facility] 3W/SW Attending Jailene Galindo MD   Hosp Day # 0 PCP Rome Morgan MD     PCP: Rome Morgan MD      SEE ATTENDING NOTE AT BOTTOM OF PAGE    Is this a shared or split note between Advanced Practice Provider and Physician? Yes    Assessment and Plan:    This is a 84-year-old male with history of coronary artery disease status post CABG  then PCI in , CKD, hypertension, hyperlipidemia, presents to the hospital for evaluation of multiple falls/syncope/presyncope attributed to hayden with orthostasis s/p IVF. Despite fluid hesitation patient continues to have issues with orthostasis and baseline high blood pressures at this time cardiology is recommending to allow permissive hypertension in the setting of orthostasis. Code stroke called with dysarthria/confusion/facial droop, seen by neurology with negative CTA brain and MRI, pt being tx for possible tia vs encephalopathy, will need neurology follow up. Plans for outpt zio monitor,  bp check and carotid US. [facility] to follow pt at discharge.  Plan was to discharge today however patient had  this morning, plans for home on Friday if bp stable for 24 hours, norvasc was added. Pt has appointments for pcp , cards  and neurology 9/3, below for details      Falls/Syncope/Pre Syncope  HTN with Orthostasic Hypotension    -EKG negative for acute ischemia. Troponin normal  -Echo with 50% with WMA, grade1 DD, mild MR and mild TR  -bp meds adjusted, per cards want pt to have higher bp in setting of orthostasis.Norvasc added last night, continue Coreg  -s/p IVF  -Patient dose not want to wear abdominal binder and thigh-high KAHLIL hose, he prefers knee high kahlil hose   -encouraged ambulation  -outpt bp check and  zio monitor  -as per cards      Global Amnesia/? TIA/Encephalopathy  -code stroke called during  admission  -CTA brain and neck- no acute infarct/hemorrhage and B/L carotids with less than 50%  -MRI brain negative for acute process, mild microvascular white matter changes  -vitamin b and copper level pending  -negative for syphilis  -TSH normal  -EEG with global encephalopathy, no seizures   -risk factor modification  -continue zetia (consider PCSK9 vs alternate statin), plavix and ASA  -outpt zio monitor with cards  -follow up with neurology as outpt with assessment of cognitive/memory-Dr Leiva 9/3     JOHANA on CKD Stage 3-now back at baseline   -baseline cr 1.4-1.6, admission Cr 2 now 1.4    Carotid Disease- B/L  -CTA neck with less than 50% ICA stenosis   -outpt carotid US     CAD S/P CABG and PCI  HL-statin intolerant  HTN  -continue asa and zetia   -home toprol and procardia stopped, now on coreg  -? PCSK9 vs alternate statin, defer to usual cardiologist  -follow up BP check with cards  -follow up with Dr Means 8/21     MA/ACMIRELLA Reach  -ER Visits 2024: 1  -Admissions 2024: 1  -Re- Entry: no  -Consults: cards and neurology   -Discharge Needs:[facility]   -Appointments: [ x ] PCP Rome Morgan MD- 8/14     FEN  -lytes in am  -diet-cardiac     Prophy  -SCD  -heparin    Dispo  -plans for home tomorrow if blood pressure stable x 24 hours  -update Given to daughter at bedside  PCP: Rome Morgan MD      Concerns regarding plan of care were discussed with patient. Patient agrees with plan as detailed above. Discussed plan of care with Dr. Bob      Note: This chart was prepared using voice recognition software and may contain unintended word substitution errors.      Carin Márquez RN, NP   [facility] Hospitalist Team  Contact via Perfect Serve and Bubble (Check Availability)    SUBJECTIVE:   Events of last night noted.  Patient stated he was feeling unwell and they came in and his blood pressure was 230, he was given amlodipine.  Daughter at bedside and given update.  Plans for ambulation and  continued blood pressure monitoring for stability for the next 24 hours with plans for discharge tomorrow    OBJECTIVE:   Blood pressure 147/75, pulse 59, temperature 97.9 °F (36.6 °C), temperature source Oral, resp. rate 19, weight 176 lb 12.8 oz (80.2 kg), SpO2 97%.    GENERAL: no apparent distress  NEURO: A/A Ox3  RESP: non labored, CTA  CARDIO: Regular, no murmur  ABD: soft, NT, ND, BS+  EXTREMITIES: no edema    DIAGNOSTIC DATA:   Labs:     Recent Labs   Lab 08/01/24 1920 08/02/24  0631 08/02/24  1454   WBC 4.7 3.4* 3.0*   HGB 12.4* 11.5* 12.1*   MCV 92.0 93.1 93.4   .0 157.0 161.0   INR  --   --  1.06       Recent Labs   Lab 08/01/24 1920 08/02/24  0631 08/02/24  1454    141 140   K 4.2 4.3 4.5    111 110   CO2 22.0 24.0 25.0   BUN 26* 21 20   CREATSERUM 2.01* 1.64* 1.42*   CA 8.8 9.0 9.8   MG 2.2  --   --    * 94 86       Recent Labs   Lab 08/02/24  1454   ALT 13   AST 17   ALB 4.2       Recent Labs   Lab 08/05/24  0607 08/05/24  1246 08/05/24  1751 08/05/24  2359 08/06/24  0644   PGLU 99 105* 95 107* 92       No results for input(s): \"TROP\" in the last 168 hours.        MEDICATIONS       lipase-protease-amylase (Lip-Prot-Amyl)  10,000 Units Oral See Admin Instructions    sodium chloride  1,000 mL Intravenous Once    carvedilol  6.25 mg Oral BID with meals    clopidogrel  75 mg Oral Daily    aspirin  81 mg Oral Daily    ezetimibe  10 mg Oral Nightly    pantoprazole  40 mg Oral BID    heparin  5,000 Units Subcutaneous Q8H JUSTICE         glycerin-hypromellose-    acetaminophen **OR** acetaminophen    ondansetron    metoclopramide    ibuprofen    Carin Márquez NP      IMAGING     No results found.      SEE ATTENDING NOTE BELOW:     Patient examined and assessed independently. Agree with above APN assessment.     S: had chest pain and elevated BP overnight.     Objective  BP (!) 88/60 (BP Location: Right arm)   Pulse 56   Temp 97.9 °F (36.6 °C) (Oral)   Resp 14   Wt 176 lb  12.8 oz (80.2 kg)   SpO2 98%   BMI 23.98 kg/m²     Exam:  GEN: elderly male in NAD  HEENT: EOMI  Pulm: CTAB, no crackles or wheezes  CV: RRR, no murmurs  ABD: Soft, non-tender, non-distended, +BS  SKIN: warm, dry  EXT: no edema    Assessment/Plan    Mr. Silvestre is an 84-year-old male with history of coronary artery disease status post CABG 2014 then PCI in 2023, CKD, hypertension, hyperlipidemia, presents to the hospital for evaluation of multiple falls/syncope/presyncope attributed to johana with orthostasis s/p IVF. Despite fluid hesitation patient continues to have issues with orthostasis and baseline high blood pressures at this time cardiology is recommending to allow permissive hypertension in the setting of orthostasis. Code stroke called with dysarthria/confusion/facial droop, seen by neurology with negative CTA brain and MRI, pt being tx for possible tia vs encephalopathy, will need neurology follow up. Plans for outpt zio monitor,  bp check and carotid US. [facility] to follow pt at discharge.  Plan was to discharge today however patient had  this morning, plans for home on Friday if bp stable for 24 hours, norvasc was added. Pt has appointments for pcp 8/14, cards 8/21 and neurology 9/3, below for details      Falls/Syncope/Pre Syncope  HTN with Orthostasic Hypotension    Global Amnesia/? TIA/Encephalopathy  JOHANA on CKD Stage 3-now back at baseline   Carotid Disease- B/L  CAD S/P CABG and PCI  HL-statin intolerant  HTN    Plan:  - monitor BP overnight  - d/w cardiology, plan to allow permissive supine HTN to avoid orthostatic hypotension  - continue KAHLIL hose, does not want to wear abdominal binder      Rest as above.    Cassidy Bob MD  [facility] hospitalist

## 2024-08-08 NOTE — PROGRESS NOTES
On call Alexa 08/08/24    RRT called to room COF 14    On my arrival patient complaining of seeing flashes of light, numbness and tingling in right arm and left face that resolved by the time I came to see him.  As per RN blood pressure in the 230s systolic.  Patient admitted with postural hypotension.  There have been episodes where his systolic pressure has dropped down to 90    On exam  Temperature 97.8  Pulse 58  Respiration 16  /104  Pulse ox 100% on room air  Alert oriented in no distress  Chest clear  Heart bradycardic  Abdomen soft  Extremities no edema    Assessment and plan    Accelerated hypertension  Considering frequent drops in blood pressure for now we will only give 5 mg of amlodipine.  Monitor closely.    Postural hypotension  Patient dropped his systolic pressure by 60 mm when he stood up however not tachycardic, complaining of dizziness.  KAHLIL hose stockings placed, along with abdominal binder.  Patient may benefit from waist high stockings and possibly Florinef.    35 minutes of critical care time spent with patient including coordinating care with ancillary staff

## 2024-08-08 NOTE — PLAN OF CARE
RRT    *See RRT Documentation Record*    Reason the RRT was called: Patient called complaining of left arm numbness and then started to have chest pain.   Assessment of patient leading up to RRT: Blood pressure was elevated and patient was complaining of chest pain and left arm numbness.   Interventions/Testin lead EKG, one time dose of amlodipine, compression socks and abdominal binder.   Patient Outcome/Disposition: Symptoms resolved and blood pressure came down   Family Notified: no  Name of family notified: n/a

## 2024-08-08 NOTE — PROGRESS NOTES
[facility]  part of [facility]    Cardiology Progress Note    David Silvestre Patient Status:  Observation    2/15/1940 MRN [id number]   Location [facility] 3W/SW Attending Jailene Galindo MD   Hosp Day # 0 PCP Rome Morgan MD       Impression/Plan:  84 year old male presenting with:     Syncope and Presyncope, +orthostatics  - neuro workup ongoing, MRI brain negative for acute process.   - EKG, echo with no acute changes   CAD s/p CABG 5v   s/p JACOB  SVG RCA  JOHANA on CKD  HL, can only tolerate 1-2 days per week on statin  HTN, has had recent med changes     - Continue present BP meds for now (carvedilol 6.25mg bid); will need to allow permissive supine HTN to avoid orthostatic hypotension. Over the past year has had multiple med changes up and down. Suggest keeping BP on higher end to avoid syncope. Anxiety also playing a role in keeping BP elevated when it is being measured.  - Cont asa, plavix, zetia  - Alternate statin/PCSK9i to be determined as outpatient  - PT/OT can help with orthostasis  - Monitor rhythm on telemetry  - Will follow       Patient Dr Caraballo      Subjective:     Became very anxious (and still is) from elevated BP this morning of 230. Given single dose amlodipine. He is also anxious of the thigh high jayson hose.        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)    Coronary artery disease involving coronary bypass graft of native heart without angina pectoris    Atherosclerosis of aorta (HCC)    Ischemic cardiomyopathy    Diastolic dysfunction    Hypertensive heart and chronic  kidney disease with heart failure and stage 1 through stage 4 chronic kidney disease, or unspecified chronic kidney disease (HCC)    Brain atrophy (HCC)    Anemia, unspecified type    Peptic ulcer disease    Stage 3 chronic kidney disease, unspecified whether stage 3a or 3b CKD (HCC)    Mood disorder (HCC)    Syncope, near    Dehydration    Memory deficits    Orthostatic intolerance    Recurrent falls    TIA (transient ischemic attack)    Toxic metabolic encephalopathy    Cerebral atrophy (HCC)       Objective:   Temp: 97.9 °F (36.6 °C)  Pulse: 59  Resp: 19  BP: 147/75    Intake/Output:     Intake/Output Summary (Last 24 hours) at 8/8/2024 0608  Last data filed at 8/8/2024 0404  Gross per 24 hour   Intake 600 ml   Output 1300 ml   Net -700 ml       Last 3 Weights   08/08/24 0404 176 lb 12.8 oz (80.2 kg)   08/07/24 0506 172 lb 8 oz (78.2 kg)   08/06/24 0510 172 lb 6.4 oz (78.2 kg)   08/05/24 0650 173 lb 11.2 oz (78.8 kg)   08/04/24 0550 174 lb 8 oz (79.2 kg)   08/01/24 2353 173 lb 14.4 oz (78.9 kg)   01/22/24 1247 160 lb (72.6 kg)   03/16/22 0806 162 lb 11.2 oz (73.8 kg)       Tele: SR, PACs    Physical Exam:    General: awake, alert, oriented x 3, no acute distress  HEENT: at/nc, perrl, eomi  Neck: supple  Cardiac: Regular rate and rhythm, S1, S2 normal, no murmur, rub or gallop.  Lungs: Clear without wheezes, rales, rhonchi or dullness.  Normal excursions and effort.  Abdomen: Soft, non-distended  Extremities: Without clubbing, cyanosis or edema.    Neurologic: Alert and oriented, normal affect.  Psych: normal mood and affect  Skin: Warm and dry.     Laboratory/Data:    Labs:         Recent Labs   Lab 08/01/24  1920 08/02/24  0631 08/02/24  1454   WBC 4.7 3.4* 3.0*   HGB 12.4* 11.5* 12.1*   MCV 92.0 93.1 93.4   .0 157.0 161.0   INR  --   --  1.06       Recent Labs   Lab 08/01/24  1920 08/02/24  0631 08/02/24  1454    141 140   K 4.2 4.3 4.5    111 110   CO2 22.0 24.0 25.0   BUN 26* 21 20    CREATSERUM 2.01* 1.64* 1.42*   CA 8.8 9.0 9.8   MG 2.2  --   --    * 94 86       Recent Labs   Lab 08/02/24  1454   ALT 13   AST 17   ALB 4.2       No results for input(s): \"TROP\" in the last 168 hours.          Echo:   Conclusions:     1. Left ventricle: The cavity size was normal. Wall thickness was normal.      Systolic function was at the lower limits of normal. The estimated      ejection fraction was 50%, by biplane method of disks. Hypokinesis of the      apicalinferior wall. Doppler parameters are consistent with abnormal left      ventricular relaxation - grade 1 diastolic dysfunction.   2. Left atrium: The atrium was mildly enlarged.   3. Mitral valve: There was mild regurgitation.   4. Tricuspid valve: There was mild regurgitation.   5. Pulmonary arteries: Systolic pressure was mildly increased, estimated to      be 41mm Hg.           CATH 3/23:  · Severe native multivessel disease. Patent LIMA-LAD, SVG-D1, and SVG-OM1.   There is a severe stenosis in the proximal SVG-RCA. The sequential portion   of the graft to the RPDA is occluded. The limb to the RPL is patent.   · Successful PCI of the proximal SVG-RCA with 3.5x30mm [place] JACOB x 1.     1) ASA 81mg and clopidogrel 75mg daily.   2) Post procedural IVF in the setting of CKD   3) Cardiology inpatient service to follow.     Coronary Findings Diagnostic Dominance: Right   Left Main: Ost LM lesion with 30% stenosis.   Left Anterior Descending: Prox LAD to Mid LAD lesion with 95% stenosis. Partially in stent. First Diagonal Branch: 1st Diag lesion with 100% stenosis. The vessel is total chronically occluded.   Left Circumflex: Patent stents from the mid LCX into OM2. First Obtuse Marginal Branch: 1st Mrg lesion with 100% stenosis. The vessel is total chronically occluded.   Right Coronary Artery: Mid RCA lesion with 100% stenosis. The vessel is total chronically occluded.   Graft To 1st Mrg: The graft is free of disease.   Graft To Dist RCA: There is  occlusion of the Y-portion of the graft to the RPDA. The portion to the RPL is patent. The proximal main graft has a severe stenosis. Prox Graft lesion with 80% stenosis.   Graft To 1st Diag: The graft exhibits minimal (20%) luminal irregularities.   LIMA Graft To Mid LAD: The graft is free of disease.     Intervention   Prox Graft lesion (Graft To Dist RCA): Stent: Post-Intervention Lesion Assessment: Pre-intervention KIZZY flow: 3. Post-intervention KIZZY flow is 3. There is a 0% residual stenosis post intervention.           Allergies:   Allergies   Allergen Reactions    Thorazine [Chlorpromazine] CONFUSION and OTHER (SEE COMMENTS)     Per pt cva symptoms    Crestor [Rosuvastatin] PAIN and MYALGIA     Joint pain    Nexletol [Bempedoic Acid] OTHER (SEE COMMENTS)     Reportedly knee pain and variable BP fluctuation     Pravachol [Pravastatin] PAIN and MYALGIA     Joint pain     Repatha [Evolocumab] DIARRHEA and MYALGIA          Zocor [Simvastatin] PAIN and MYALGIA     joint    Imdur [Isosorbide] OTHER (SEE COMMENTS)     Head ache    Praluent [Alirocumab] DIARRHEA    Statins OTHER (SEE COMMENTS)     Per patient after 2-4 days the medication affects knees and patient is unable to walk.    Sulfa Antibiotics RASH       Medications:  Current Facility-Administered Medications   Medication Dose Route Frequency    glycerin-hypromellose- (Artificial Tears) 0.2-0.2-1 % ophthalmic solution 1 drop  1 drop Both Eyes QID PRN    pancrelipase (Lip-Prot-Amyl) (Zenpep) DR particles cap 10,000 Units  10,000 Units Oral See Admin Instructions    sodium chloride 0.9 % IV bolus 1,000 mL  1,000 mL Intravenous Once    carvedilol (Coreg) tab 6.25 mg  6.25 mg Oral BID with meals    acetaminophen (Tylenol) tab 650 mg  650 mg Oral Q4H PRN    Or    acetaminophen (Tylenol) rectal suppository 650 mg  650 mg Rectal Q4H PRN    ondansetron (Zofran) 4 MG/2ML injection 4 mg  4 mg Intravenous Q6H PRN    metoclopramide (Reglan) 5 mg/mL injection 5  mg  5 mg Intravenous Q8H PRN    clopidogrel (Plavix) tab 75 mg  75 mg Oral Daily    ibuprofen (Motrin) tab 400 mg  400 mg Oral TID PRN    aspirin DR tab 81 mg  81 mg Oral Daily    ezetimibe (Zetia) tab 10 mg  10 mg Oral Nightly    pantoprazole (Protonix) DR tab 40 mg  40 mg Oral BID    heparin (Porcine) 5000 UNIT/ML injection 5,000 Units  5,000 Units Subcutaneous Q8H JUSTICE

## 2024-08-08 NOTE — PAYOR COMM NOTE
--------------  ADMISSION REVIEW     Payor: [employer]  Subscriber #:  [id number]  Authorization Number: [id number]    Admit date: 8/8/24  Admit time: 11:30 AM   Admit Orders (From admission, onward)       Start     Ordered    08/08/24 1130  Admit to inpatient Once  Once        Ordering Provider: Cassidy Bob MD   Question:  Diagnosis  Answer:  Syncope, near    08/08/24 1129    08/02/24 0008  Place in observation Once  (Place Observation Cardiovascular)  Once        Ordering Provider: Juan Pablo Jones MD   Question:  Diagnosis  Answer:  Syncope, near    08/02/24 0007               REVIEW DOCUMENTATION:     ED Provider Notes        ED Provider Notes signed by Juan Pablo Jones MD at 8/1/2024 10:40 PM       Author: Juan Pablo Jones MD Service: -- Author Type: Physician    Filed: 8/1/2024 10:40 PM Date of Service: 8/1/2024  8:46 PM Status: Signed    : Juan Pablo Jones MD (Physician)           Patient Seen in: [facility] Emergency Department      History     Chief Complaint   Patient presents with    Chest Pain Angina     Brought by OT ambulance, chest pain x4 days and today radiates to L arm      Stated Complaint: chest pain    84-year-old male with extensive cardiac history getting most of his care at [facility] who presents after he has been experiencing some chest pain today and 2 near syncopal episodes.  One was while standing while the other while getting and changing positions.  Family is out of town in [place].  He denies back pain.  No shortness of breath.  No recent medication changes.  No fever.    Positive for stated Chief Complaint: Chest Pain Angina (Brought by OT ambulance, chest pain x4 days and today radiates to L arm )    Other systems are as noted in HPI.  Constitutional and vital signs reviewed.      All other systems reviewed and negative except as noted above.    Physical Exam     ED Triage Vitals [08/01/24 1915]   BP 98/53   Pulse 60   Resp 10    Temp 98.1 °F (36.7 °C)   Temp src Oral   SpO2 96 %   O2 Device None (Room air)       Current Vitals:   Vital Signs  BP: 130/73  Pulse: 56  Resp: 15  Temp: 98.1 °F (36.7 °C)  Temp src: Oral  MAP (mmHg): 91    Oxygen Therapy  SpO2: 96 %  O2 Device: None (Room air)      Constitutional: Oriented to person, place, and time.  Appears well-developed. No distress.   Head: Normocephalic and atraumatic.   Eyes: Conjunctivae are normal. Pupils are equal, round, and reactive to light.   Neck: Normal range of motion. Neck supple.   Cardiovascular: Normal rate, regular rhythm and intact and equal distal pulses.    Pulmonary/Chest: Effort normal. No respiratory distress.  No audible wheeze or crackles  Abdominal: Soft. There is no tenderness. There is no guarding.   Musculoskeletal: Normal range of motion. No edema or tenderness.   Neurological: Alert and oriented to person, place, and time.  No gross focal deficits  Skin: Skin is warm and dry.   Psychiatric: Normal mood and affect.  Behavior is normal.   Nursing note and vitals reviewed.    Differential diagnosis includes orthostatic or vasovagal syncope, near syncope, less likely cardiogenic syncope, dehydration, renal insufficiency, less likely ACS.      ED Course     Labs Reviewed   BASIC METABOLIC PANEL (8) - Abnormal; Notable for the following components:       Result Value    Glucose 138 (*)     BUN 26 (*)     Creatinine 2.01 (*)     Calculated Osmolality 299 (*)     eGFR-Cr 32 (*)     All other components within normal limits   CBC W/ DIFFERENTIAL - Abnormal; Notable for the following components:    HGB 12.4 (*)     HCT 36.6 (*)     RDW-SD 47.2 (*)     Lymphocyte Absolute 0.99 (*)     All other components within normal limits   TROPONIN I HIGH SENSITIVITY - Normal   MAGNESIUM - Normal   CBC WITH DIFFERENTIAL WITH PLATELET    Narrative:     The following orders were created for panel order CBC With Differential With Platelet.  Procedure                                Abnormality         Status                     ---------                               -----------         ------                     CBC W/ DIFFERENTIAL[[id number]]          Abnormal            Final result                 Please view results for these tests on the individual orders.   TROPONIN I HIGH SENSITIVITY   RAINBOW DRAW LAVENDER   RAINBOW DRAW LIGHT GREEN   RAINBOW DRAW BLUE   RAINBOW DRAW GOLD     EKG    Rate, intervals and axes as noted on EKG Report.  Rate: 58  Rhythm: Sinus Rhythm  Reading: No acute ST segment changes, there are lateral T wave inversions w/ unknown chronicity      XR CHEST AP PORTABLE  (CPT=71045)    Result Date: 8/1/2024    CONCLUSION: No acute cardiopulmonary abnormality.    Dictated by (CST): Michael Parks MD on 8/01/2024 at 7:56 PM     Finalized by (CST): Michael Parks MD on 8/01/2024 at 7:57 PM           MDM        Admission disposition: 8/1/2024 10:39 PM       Medical Decision Making  Patient looks very clinically well however he was symptomatic during initial orthostatics from sitting to standing and remained such after fluids.  He has some new T wave inversions laterally which may be old but I cannot confirm based on what information I have here.  Given his history of chest pain symptoms and EKG changes patient will be admitted overnight.  Second troponin pending.  He is agreeable.  I did message the hospitalist.  Dr. Caraballo is on [facility] for cardiology and was message as well    Problems Addressed:  Dehydration: acute illness or injury with systemic symptoms  Syncope, near: acute illness or injury with systemic symptoms    Amount and/or Complexity of Data Reviewed  External Data Reviewed: radiology and ECG.     Details: You are fine to go ahead once1/22/24 EKG w/o lateral T-wave inversions but the presence of T wave flattening, otherwise the EKG is morphologically similar      By review of care everywhere notes the patient had a negative CT of the chest in December 2023  Labs:  ordered. Decision-making details documented in ED Course.     Details: By my review there is no obvious evidence of pulmonary edema, pleural effusion, pneumothorax or focal infiltrate on x-ray imaging.  Radiology: ordered and independent interpretation performed. Decision-making details documented in ED Course.  ECG/medicine tests: ordered and independent interpretation performed. Decision-making details documented in ED Course.    Risk  Decision regarding hospitalization.        Disposition and Plan     Clinical Impression:  1. Syncope, near    2. Dehydration         Disposition:  Admit  8/1/2024 10:39 pm  Hospital Problems       Present on Admission  Date Reviewed: 3/16/2022            ICD-10-CM Noted POA    * (Principal) Syncope, near R55 8/1/2024 Unknown                 8/2 H&P    This is a 84-year-old male with history of coronary artery disease status post CABG 2014 then PCI in 2023, CKD, hypertension, hyperlipidemia, presents to the hospital for evaluation of multiple falls.  The patient lives at home with family however they are out of town on vacation.  The patient states the last few days he noticed that he has had a few falls at home.  Yesterday he states he could not get up from the fall and EMS was called.  Yesterday morning he felt like he could barely sit up in a chair.  The patient denies any chest pain, palpitations, or trouble breathing.  He has had multiple changes to his blood pressure medicines in the recent past.  He has noted symptoms of lightheadedness even as an outpatient and some medications were stopped.  He also tells me that he feels like his speech is not normal.  No trouble swallowing.  No focal weakness.  He denies any paresthesias.       This is a 84-year-old male with history of coronary artery disease status post CABG 2014 then PCI in 2023, CKD, hypertension, hyperlipidemia, presents to the hospital for evaluation of multiple falls.      Falls  Syncope  -suspect more likely  orthostasis and shifts in BP. However given he states he feels is speech is not the same we will order MRI brain   -obtain echocardiogram  -continue aspirin   -hold BP meds right now and trend, and we will re-evaluate meds. Also avoid drop in BP pending MRI brain   -PT/OT  -continue IV fluids, cr is improved today   -does not tolerate statin   Addendum: stroke alert called at ~130pm. No focal symptoms on my exam. Patient is complianing that he is having trouble prounouncing certain words. Maybe \"fuzzy\" vision, and memory issue  -CT scans ordered. Will change MRI to stat if possible  -neurology to see, discussed with them  -patient received aspirin already  -await CT before adding more antiplatelet  -per patient he doesn't tolerate statin apparently  -echo was already ordered  -monitor tele  -neuro checks      Coronary artery disease  -cabg 2014, PCI to RCA 2023  -on aspirin   -on zetia, intolerant to statin   -no chest pain      Hypertension  -will review and adjust accordingly here      JOHANA on CKD  -improved with saline     Fluids: 0.9 saline  DVT prophylaxis: heparin sub q  Code status: FULL               8/2 Neurology  Assessment  David Silvestre is a 84 year old right handed male w/ a pmhx sig. for CAD s/p CABG 5v  2014 s/p JACOB 2023 SVG RCA, statin intolerance, multiple admissions in the past for hypertension with labile blood pressures, HTN, HLD, CAD, and CKD, amitted for 4 days of?  Short-term and long-term memory deficits, recurrent falls including most recent fall with inability to stand, syncope and presyncope, and reported dysarthria seen as a stroke alert for reported worsening of the deficits which initially brought him in.  HPI as per patient, hospitalist who admitted the patient, ICU team responding to stroke alert, and chart review.  NIH on this author's exam was 1, because the patient cannot recall his age.  Do not think this was an acute ischemic stroke.  This was confirmed by his MRI of the brain  that was negative for any acute infarct.  His symptoms are most likely due to combination of recurrent syncope/presyncope/orthostatic intolerance.  Recommend checking orthostatic vitals evaluate for neurogenic based on neurogenic orthostatic syncope.  Memory deficits may be related to anxiety or undiagnosed mood disorder.     Orthostatic hypotension can be neurogenic or nonneurogenic.  Neurogenic causes include Parkinson disease, small fiber/peripheral neuropathy, and other alpha-synucleinapathies such as pure autonomic failure.     Nonneurogenic causes include medications, anemia, loss of muscle bulk.  one way to distinguish between them is the change in heart rate over the change in blood pressure.        4 days of?  Short-term and long-term memory deficits, recurrent falls including most recent fall with inability to stand, syncope and presyncope,  Differential Diagnosis:  Not an acute ischemic stroke.  Given that the patient fell on the day prior to admission and could not stand, agree that it was worthwhile to investigate cerebrovascular disease/stroke as a possible etiology.  Not TIA based on the fact that his hospitalist reexamine the patient during the stroke code and did not appreciate any new focal deficits.  Greatly appreciate 's help.  Neurogenic versus nonneurogenic orthostatic syncope           Plan  Reperfusion therapy eligibility: patient is not eligible because: out of the  time window  not an acute ischemic stroke  not a candidate for thrombectomy because no large vessel occlusion  Agent and time of first antithrombotic administration (or contraindication):   Aspirin 325 once or rectal aspirin 300 once. Starting tomorrow Aspirin 81 mg daily or rectal aspirin 300 mg daily if still n.p.o.  BP goal:   Normotension; MAP > 65 SBP > 90  Additional brain and vascular imaging ordered:   MRI brain WO, CT brain WO, and CTA head/neck    Cardiac imaging: Telemetry   Additional labs ordered:   Labs:  Fasting lipid panel and HgbA1C  Dysphagia status:   DysphagiaNo acute facial droop; per RN swallow evaluation.  Fluids/nutrition:   ivfstroke: AVOID Hypotonic fluids in patients with acute ischemic stroke or cerebral edema.  Hypotonic fluids can worsen cerebral edema.  This includes D5 W, half-normal saline, and lactated Ringer's.  If patients need glucose then please use normal saline.  DVT prophylaxis:   SCD's while in bed  Therapy/rehab services ordered (speech/PT/OT/rehab consult):   Physical therapy, Occupational Therapy, speech therapy evaluations ordered.   maintain oxygen saturation >94%. No supplemental oxygen  in nonhypoxic patients with acute ischemic stroke (AIS).  Tx hyperthermia (temperature >38°C) and identify source  Evidence indicates that persistent in-hospital hyperglycemia during the first 24 hours after AIS is associated with worse outcomes than normoglycemia and thus, it is reasonable to treat hyperglycemia to achieve blood glucose levels in a range of 140 to 180 mg/dL and to closely monitor to prevent hypoglycemia in patients with AIS.  Neuro checks Q4.  Telemetry.NIH stroke scale per protocol.  Other: Check orthostatic vitals.           Education/Instructions given to: {Persons; family members:patient   Barriers to Learning:None  Content: Refer to note above.  Also provided education on recurrent stroke signs and symptoms, including but not limited to a facial droop, dysarthria, difficulty talking, word finding difficulties, weakness, falls, change in sensation. Pt should call 911 or be taken to the nearest emergency department if sx occur.  Evaluation/Outcome: Verbalized understanding     This document is not intended to support charting by exception.  Sections left blank in a completed note should be presumed not to have been done.     Critical care time exceeds 35 minutes.               8/3 Cardiology  Assessment/Plan:     IMPRESSIONS:  Syncope and Presyncope, suspect r/t dehydration  CAD  s/p CABG 5v  2014 s/p JACOB 2023 SVG RCA  JOHANA on CKD  HL, can only tolerate 1-2 days per week on statin  HTN, has had symptomatic low BP in the past, recent med changes, unclear what he is taking           PLAN:  - EKG, echo with no acute changes   - Renal function improved with IVF  - Alternate statin/PCSK9i to be determined as outpatient  - Unclear what he is taking at home, nifedipine and lopressor on his list. Will switch Lopressor to Carvedilol for BP control, hold nifedipine. monitor rhythm on Telemetry  - ideally would simplify meds as much as possible. Possible he has taken medications inappropriately with his daughter being out of town.   -plan for 14 day zio as outpatient            Subjective  Still feels like his memory was acutely affected, concerned that he is still not thinking clearly. Denies shortness of breath, lightheadedness or chest pain.               8/3 IM     Assessment/Plan:   This is a 84-year-old male with history of coronary artery disease status post CABG 2014 then PCI in 2023, CKD, hypertension, hyperlipidemia, presents to the hospital for evaluation of multiple falls.      Falls  Syncope  -suspect more likely orthostasis and shifts in BP. However given he stated he felt his speech was not the same we ordered MRI brain. Also stroke alert called 8/2/24 ~130pm   -monitor BP with BP med changes  =ambulate and mobilize  -PT/OT  -appreciate neurology and cards evals  -on dual antiplatelet for now incase he had TIA   -monitor orthostatics at least daily      Coronary artery disease  -cabg 2014, PCI to RCA 2023  -on aspirin   -on zetia, intolerant to statin   -no chest pain      Hypertension  -adjusting accordingly here      JOHANA on CKD  -improved with saline, can stop IV fluids     DVT prophylaxis: heparin sub q  Code status: FULL      Updated daughter evening of 8/2     Dispo: pending: follow therapy evals, may need placement until family back into town       Blood pressure 146/71, pulse 65,  temperature 97.9 °F (36.6 °C), temperature source Oral, resp. rate 20, weight 173 lb 14.4 oz (78.9 kg), SpO2 96%.     Temp:  [97.4 °F (36.3 °C)-98.2 °F (36.8 °C)] 97.9 °F (36.6 °C)  Pulse:  [53-68] 65  Resp:  [12-20] 20  BP: (128-184)/(62-90) 146/71  SpO2:  [96 %-98 %] 96 %             8/3 Neurology    ASSESSMENT:  The patient is a 84 year old  man with past medical history of coronary artery disease, hypertension, hyperlipidemia, chronic kidney disease who presented with recurrent falls in the setting of orthostatic hypotension.  Neurology is consulted for speech changes noted >24 hours prior to admission; stroke alert called for left facial droop noted 8/2/2024.  Due to last known normal being greater than 24 hours, he was not a candidate for thrombolysis or thrombectomy.  - and hemoglobin A1c 5.9      His neurological examination is significant for left facial droop on low-effort smile that becomes symmetric with increased effort; disorientation to year; circumferential speech; impaired short-term memory issues.       TIA left facial droop - could be baseline finding for him, however - likely lacunar from hyperlipidemia   Encephalopathy disorientation, circumferential speech, anterograde amnesia, suspect underlying cognitive impairment (MRI with temporal lobe atrophy) but this does not explain acute change, which could be related to metabolic or epileptic etiologies; less likely MRI-negative stroke  -Continue  Aspirin 81 mg daily and Plavix 75 mg daily for 21 days, then Plavix monotherapy   -Continue Zetia (cannot tolerate statins) - agree with consideration of alternative agent due to LDL elevation   -PT, OT and speech therapy   -Blood pressure goals: Normotension without >25% drop per day    -Outpatient 30-day cardiac monitoring   -Routine EEG  -Metabolic evaluation             8/4 Cardiology    Assessment/Plan:     IMPRESSIONS:  Syncope and Presyncope, suspect r/t dehydration  - neuro workup ongoing,  MRI brain negative for acute process.   - EKG, echo with no acute changes   CAD s/p CABG 5v  2014 s/p JACOB 2023 SVG RCA  JOHANA on CKD  HL, can only tolerate 1-2 days per week on statin  HTN, has had symptomatic low BP in the past, recent med changes, he is unsure of what he is taking           PLAN:  - orthostatics positive, will reduce coreg and will give 500cc bolus, encouraged oral hydration  - Alternate statin/PCSK9i to be determined as outpatient  - Unclear what antihypertensives he is taking at home, nifedipine and lopressor on his list, but he does not think he is on these. Blood pressures reasonably well controlled with carvedilol  - monitor rhythm on Telemetry  - ideally would simplify meds as much as possible. Possible he has taken medications inappropriately with his daughter being out of town.   -plan for 14 day zio as outpatient            Subjective  Denies shortness of breath, mildly lightheaded upon standing            8/4  IM     Assessment/Plan:   This is a 84-year-old male with history of coronary artery disease status post CABG 2014 then PCI in 2023, CKD, hypertension, hyperlipidemia, presents to the hospital for evaluation of multiple falls.      Falls  Syncope  -suspect more likely orthostasis and shifts in BP. However given he stated he felt his speech was not the same we ordered MRI brain. Also stroke alert called 8/2/24 ~130pm   -monitor BP with BP med changes  -ambulate and mobilize  -PT/OT  -appreciate neurology and cards evals  -on dual antiplatelet for now incase he had TIA   -monitor orthostatics at least daily      Coronary artery disease  -cabg 2014, PCI to RCA 2023  -on aspirin   -on zetia, intolerant to statin   -no chest pain      Hypertension  -adjusting accordingly here (coreg dosing reduced per cards today)     JOHANA on CKD  -improved with saline, now off IV fluids (getting a bolus this AM however given orthostasis)     DVT prophylaxis: heparin sub q  Code status: FULL      Updated  daughter evening of 8/2. They are out of town but returning this week  Patient feels comfortable going back home.   Still orthostatic today, adjusting coreg. Repeat orthostatics tomorrow. Home maybe by tomorrow   Home care services at discharge     Rina Haley Hospitalist       Subjective:      Feels ok. No new acute complaints.         OBJECTIVE:     Blood pressure 97/61, pulse 58, temperature 98.4 °F (36.9 °C), temperature source Oral, resp. rate 20, weight 174 lb 8 oz (79.2 kg), SpO2 96%.        sodium chloride  500 mL Intravenous Once               8/5 Cardiology    Assessment/Plan:     IMPRESSIONS:  Syncope and Presyncope, suspect r/t dehydration  - neuro workup ongoing, MRI brain negative for acute process.   - EKG, echo with no acute changes   CAD s/p CABG 5v  2014 s/p JACOB 2023 SVG RCA  JOHANA on CKD  HL, can only tolerate 1-2 days per week on statin  HTN, has had recent med changes           PLAN:  - continue present BP meds for now: labile SBP  past 24 hours. Has had multiple med changes up and down past several months.  - Alternate statin/PCSK9i to be determined as outpatient  - monitor rhythm on Telemetry  -plan for 14 day zio as outpatient             8/5 IM        Assessment and Plan:     his is a 84-year-old male with history of coronary artery disease status post CABG 2014 then PCI in 2023, CKD, hypertension, hyperlipidemia, presents to the hospital for evaluation of multiple falls/syncope/presyncope attributed to johana with orthostasis s/p IVF. Code stroke called with dysarthria/confusion/facial droop, seen by neurology with negative CTA brain and MRI, pt being tx for possible tia vs encephalopathy, will need neurology follow up. Per cardiology they desire pt to have higher bps. Plans for outpt zio monitor,  bp check and carotid US. [facility] to follow up. Plans for Appointments for pcp and cards. Home with orthostasis improved, see below for details      Falls/Syncope/Pre Syncope w/  Orthostasic Hypotension   -EKG negative for acute ischemia. Troponin normal  -Echo with 50% with WMA, grade1 DD, mild MR and mild TR  -bp meds adjusted, per cards want pt to have higher bp, pt is off procardia and toprol. Currently on coreg,may need to adjust with ongoing orthostasis   -8/5 continues to have orthostatic hypotension with drop in sbp to 84, will give IVF bolus   -outpt bp check with zio monitor  -plans for HHC at discharge, daughter will be home on Wednesday evening but there is NP neighbor next door      ? TIA   Encephalopathy  -code stroke called during admission  -CTA brain and neck- no acute infarct/hemorrhage and B/L carotids with less than 50%  -MRI brain negative for acute process, mild microvascular white matter changes  -vitamin b and copper level pending  -negative for syphilis  -TSH normal  -EEG pending   -risk factor modification  -continue zetia (consider PCSK9 vs alternate statin), plavix and ASA  -outpt zio monitor with cards  -follow up with neurology as outpt with assessment of cognitive/memory-Dr Cali VAUGHN on CKD Stage 3  -baseline cr 1.4-1.6, admission Cr 2 now 1.6  -s/p IVF    Pre DM  -A1C 5.9     Carotid Disease- B/L  -CTA neck with less than 50% ICA stenosis   -outpt carotid US     CAD S/P CABG and PCI  HL-statin intolerant  HTN  -continue asa and zetia   -home toprol and procardia stopped, now on coreg  -? PCSK9 vs alternate statin, defer to usual cardiologist  -follow up BP check with cards  -follow up with Dr Means 8/21     MA/MOLLY Reach  -ER Visits 2024: 1  -Admissions 2024: 1  -Re- Entry: no  -Consults: cards and neurology   -[facility]charge Needs:[facility]   -Appointments: [ x] PCP Rome Morgan MD - 8/9        FEN  -lytes in am  -IVF bolus now   -diet-cardiac      Prophy  -SCD  -heparin     Dispo  -home when orthostasis improved and cleared by neurology  -8/5 update given to daughter Harriet via phone, she will be home from out of town on Wednesday night around 10 pm. Pt  has neighbor who is NP next door that can help     Still feels like his memory is not good. Thinks dizziness is worse this  am. Per PT sbp drop to 84 with standing and pt had vision changes. EEG to be done today       Patient examined and assessed. Joselito Márquez and RN- noted w + orthostatics given 500 ml bolus x 2  now back fm EEG w pressures 197-200 systolic over upper 70s diastolic reports dizziness no CP on other focal sx      NAD, up in bed, RRR, clear abd benign no edema      Labile BP with associated presyncope and falls  - w/u as noted cards and neuro on  - received bolus for hypotension/ + orthosttics now getting home coreg back and reassess  - may need hold parameters for home meds  - add compression hose  - PT follow plan for  PT at dc      Rest as noted             8/6 Cardiology  Impression/Plan:  84 year old male presenting with:     Syncope and Presyncope, +orthostatics  - neuro workup ongoing, MRI brain negative for acute process.   - EKG, echo with no acute changes   CAD s/p CABG 5v  2014 s/p JACOB 2023 SVG RCA  JOHANA on CKD  HL, can only tolerate 1-2 days per week on statin  HTN, has had recent med changes     - Continue present BP meds for now (carvedilol 6.25mg bid); will need to allow permissive supine HTN to avoid orthostatic hypotension.   - Daily orthostatics  - Cont asa, plavix, zetia  - Alternate statin/PCSK9i to be determined as outpatient  - PT/OT  - Monitor rhythm on telemetry; consider outpatient monitor on discharge  - Will follow     Subjective: No events overnight.  Today patient reports feeling unsteady with ambulation, denies chest pain, palpitations, dyspnea.         8/6 IM    Assessment and Plan:     his is a 84-year-old male with history of coronary artery disease status post CABG 2014 then PCI in 2023, CKD, hypertension, hyperlipidemia, presents to the hospital for evaluation of multiple falls/syncope/presyncope attributed to johana with orthostasis s/p IVF. Despite fluid  hesitation patient continues to have issues with orthostasis and baseline high blood pressures at this time cardiology is recommending to allow permissive hypertension in the setting of orthostasis. Code stroke called with dysarthria/confusion/facial droop, seen by neurology with negative CTA brain and MRI, pt being tx for possible tia vs encephalopathy, will need neurology follow up. . Plans for outpt zio monitor,  bp check and carotid US. [facility] pt at discharge. Plans for home on Thursday with appointments for pcp, cards and neurology, below for details      Falls/Syncope/Pre Syncope w/ Orthostasic Hypotension   -EKG negative for acute ischemia. Troponin normal  -Echo with 50% with WMA, grade1 DD, mild MR and mild TR  -bp meds adjusted, per cards want pt to have higher bp in setting of orthostasis  -s/p IVF  -thigh high KAHLIL hose   -outpt bp check and  zio monitor  -plans for HHC at discharge, daughter will be home on Wednesday evening but there is NP neighbor next door      ? TIA   Encephalopathy  -code stroke called during admission  -CTA brain and neck- no acute infarct/hemorrhage and B/L carotids with less than 50%  -MRI brain negative for acute process, mild microvascular white matter changes  -vitamin b and copper level pending  -negative for syphilis  -TSH normal  -EEG with global encephalopathy, no seizures   -risk factor modification  -continue zetia (consider PCSK9 vs alternate statin), plavix and ASA  -outpt zio monitor with cards  -follow up with neurology as outpt with assessment of cognitive/memory-Dr Cali VAUGHN on CKD Stage 3-now back at baseline   -baseline cr 1.4-1.6, admission Cr 2 now 1.4  -s/p IVF    Pre DM  -A1C 5.9     Carotid Disease- B/L  -CTA neck with less than 50% ICA stenosis   -outpt carotid US     CAD S/P CABG and PCI  HL-statin intolerant  HTN  -continue asa and zetia   -home toprol and procardia stopped, now on coreg  -? PCSK9 vs alternate statin, defer to usual  cardiologist  -follow up BP check with cards  -follow up with Dr Means 8/21     MA/MOLLY Reach  -ER Visits 2024: 1  -Admissions 2024: 1  -Re- Entry: no  -Consults: cards and neurology   -Discharge Needs:[facility]   -Appointments: [ x] PCP Rome Morgan MD - 8/9        FEN  -lytes in am  -diet-cardiac      Prophy  -SCD  -heparin     Dispo  -plans for home on Thursday as PT recommending contact guard assist and pt lives with daughter and her family who will be coming in from out of town on Wednesday night at 10pm. He is doing to well to qualify for rehab   -8/6 update given to daughter Harriet via phonehelp   PCP: Rome Morgan MD        Concerns regarding plan of care were discussed with patient. Patient agrees with plan as detailed above. Discussed plan of care with Dr. Galindo      Note: This chart was prepared using voice recognition software and may contain unintended word substitution errors.      Carin Márquez RN, NP   [facility] Hospitalist Team  Contact via [employer] and [employer] (Check Availability)  8/6/2024     SUBJECTIVE:   Sitting in the chair.  He states he feels unchanged from yesterday.  He worked with physical therapy and his systolic blood pressure was 190 lying and dropped to 130 with standing.  Spoke with the physical therapist who recommended contact-guard assist for discharge and unfortunately patient would be home alone as his daughter and her family are out of town and not expected back until Wednesday evening.        OBJECTIVE:   Blood pressure 130/82, pulse 56, temperature 98.1 °F (36.7 °C), temperature source Oral, resp. rate 18, weight 172 lb 6.4 oz (78.2 kg), SpO2 96%.      NAD, up in bed, RRR, clear abd benign no edema      Labile BP with associated presyncope and falls  - w/u as noted cards and neuro on (w/u as detailed in progress note)  - sarkis cards recs- allowing for some higher BPs to avoid the drops  - may need hold parameters for home meds  - added compression hose  -  PT follow plan for  PT at NH           8/7 Cardiology       Impression/Plan:  84 year old male presenting with:     Syncope and Presyncope, +orthostatics  - neuro workup ongoing, MRI brain negative for acute process.   - EKG, echo with no acute changes   CAD s/p CABG 5v  2014 s/p JACOB 2023 SVG RCA  JOHANA on CKD  HL, can only tolerate 1-2 days per week on statin  HTN, has had recent med changes     - Continue present BP meds for now (carvedilol 6.25mg bid); will need to allow permissive supine HTN to avoid orthostatic hypotension.   - Daily orthostatics  - Cont asa, plavix, zetia  - Alternate statin/PCSK9i to be determined as outpatient  - PT/OT  - Monitor rhythm on telemetry; consider outpatient monitor on discharge  - Will follow     Subjective: No events overnight.  Today patient resting comfortably, no apparent distress.            8/7 IM        Assessment and Plan:     This is a 84-year-old male with history of coronary artery disease status post CABG 2014 then PCI in 2023, CKD, hypertension, hyperlipidemia, presents to the hospital for evaluation of multiple falls/syncope/presyncope attributed to johana with orthostasis s/p IVF. Despite fluid hesitation patient continues to have issues with orthostasis and baseline high blood pressures at this time cardiology is recommending to allow permissive hypertension in the setting of orthostasis. Code stroke called with dysarthria/confusion/facial droop, seen by neurology with negative CTA brain and MRI, pt being tx for possible tia vs encephalopathy, will need neurology follow up. . Plans for outpt zio monitor,  bp check and carotid US. [facility] to follow pt at discharge. Plans for home on Thursday with appointments for pcp 8/9, cards 8/21 and neurology 9/3, below for details      Falls/Syncope/Pre Syncope w/ Orthostasic Hypotension   -EKG negative for acute ischemia. Troponin normal  -Echo with 50% with WMA, grade1 DD, mild MR and mild TR  -bp meds adjusted, per  cards want pt to have higher bp in setting of orthostasis  -s/p IVF  -thigh high KAHLIL hose   -outpt bp check and  zio monitor     ? TIA   Encephalopathy  -code stroke called during admission  -CTA brain and neck- no acute infarct/hemorrhage and B/L carotids with less than 50%  -MRI brain negative for acute process, mild microvascular white matter changes  -vitamin b and copper level pending  -negative for syphilis  -TSH normal  -EEG with global encephalopathy, no seizures   -risk factor modification  -continue zetia (consider PCSK9 vs alternate statin), plavix and ASA  -outpt zio monitor with cards  -follow up with neurology as outpt with assessment of cognitive/memory-Dr Leiva 9/3     JOHANA on CKD Stage 3-now back at baseline   -baseline cr 1.4-1.6, admission Cr 2 now 1.4  -s/p IVF    Pre DM  -A1C 5.9     Carotid Disease- B/L  -CTA neck with less than 50% ICA stenosis   -outpt carotid US     CAD S/P CABG and PCI  HL-statin intolerant  HTN  -continue asa and zetia   -home toprol and procardia stopped, now on coreg  -? PCSK9 vs alternate statin, defer to usual cardiologist  -follow up BP check with cards  -follow up with Dr Means 8/21     MA/MOLLY Reach  -ER Visits 2024: 1  -Admissions 2024: 1  -Re- Entry: no  -Consults: cards and neurology   -Discharge Needs:[facility]   -Appointments: [ x] PCP Rome Morgan MD - 8/9        MICHELLE  -lyleonard in am  -diet-cardiac      Prophy  -SCD  -heparin      SUBJECTIVE:   Still feels \"fuzzy\" in the head. No other complaints `     OBJECTIVE:   Blood pressure 154/84, pulse 72, temperature 98 °F (36.7 °C), temperature source Oral, resp. rate 16, weight 172 lb 8 oz (78.2 kg), SpO2 95%.        Medications 07/30 07/31 08/01 08/02 08/03 08/04 08/05 08/06 08/07 08/08                     sodium chloride 0.9 % IV bolus 1,000 mL  Dose: 1,000 mL  Freq: Once Route: IV  Last Dose: Stopped (08/01/24 2236)  Start: 08/01/24 2013 End: 08/01/24 2236 2045 VH-New Bag     2236 VH-Stopped                sodium chloride 0.9 % IV bolus 500 mL  Dose: 500 mL  Freq: Once Route: IV  Last Dose: Stopped (08/05/24 1300)  Start: 08/05/24 1200 End: 08/05/24 1300          1200 HM-New Bag     1300 HM-Stopped           sodium chloride 0.9 % IV bolus 500 mL  Dose: 500 mL  Freq: Once Route: IV  Last Dose: Stopped (08/05/24 1154)  Start: 08/05/24 1030 End: 08/05/24 1154          1040 HM-New Bag     1154 HM-Stopped           sodium chloride 0.9 % IV bolus 500 mL  Dose: 500 mL  Freq: Once Route: IV  Last Dose: Stopped (08/04/24 1330)  Start: 08/04/24 1130 End: 08/04/24 1330         1119 FL-New Bag     1330 FL-Stopped      (1200 HM)-Not Given                 sodium chloride 0.9% infusion  Rate: 100 mL/hr  Freq: Continuous Route: IV  Last Dose: Stopped (08/03/24 0900)  Start: 08/02/24 0000 End: 08/03/24 0914       0043 LC-New Bag     1052 MW-New Bag      0900 MW-Stopped     0914-D/C'd               acetaminophen (Tylenol) tab 650 mg  Dose: 650 mg  Freq: Every 4 hours PRN Route: OR  PRN Reasons: mild pain,Headaches  PRN Comment: and fever >100  Start: 08/02/24 1350       2244 AS-Given                                       hydrALAzine (Apresoline) 20 mg/mL injection 10 mg  Dose: 10 mg  Freq: Every 2 hour PRN Route: IV  PRN Reason: Increased blood pressure  PRN Comment: SBP greater than physician ordered range  Start: 08/02/24 1350 End: 08/03/24 0915   Admin Instructions:   Administer if labetalol x2 doses is ineffective or HR <60.  If after 10 minutes SBP is elevated, notify provider.       2129 AS-Given      0520 AS-Given     0915-D/C'd               Vitals (last day)       Date/Time Temp Pulse Resp BP SpO2 Weight O2 Device O2 Flow Rate (L/min) [facility]    08/08/24 1005 -- 55 19 136/80 98 % -- None (Room air) -- MW    08/08/24 1004 -- 52 17 136/80 98 % -- None (Room air) -- MW    08/08/24 0451 97.9 °F (36.6 °C) 59 19 147/75 97 % -- None (Room air) -- JJ    08/08/24 0404 -- -- -- -- -- 176 lb 12.8 oz (80.2 kg) -- -- ARA    08/08/24 0317 --  -- -- 187/85 98 % -- -- --     08/08/24 0313 -- -- -- 170/81 -- -- -- --     08/08/24 0308 -- -- -- 214/94 -- -- -- --     08/08/24 0305 -- -- -- 219/101 -- -- -- --     08/08/24 0303 -- 64 -- -- -- -- -- -- MO    08/08/24 0302 -- -- -- 232/100 -- -- -- --     08/08/24 0300 -- 64 -- -- -- -- -- -- MO    08/08/24 0248 -- -- -- 219/99 -- -- -- --     08/07/24 2046 97.8 °F (36.6 °C) 50 17 147/69 97 % -- None (Room air) --     08/07/24 1900 -- 57 -- -- -- -- -- -- MO    08/07/24 1739 -- 65 17 141/72 95 % -- None (Room air) --     08/07/24 1401 97.8 °F (36.6 °C) 62 16 144/73 94 % -- None (Room air) -- CS    08/07/24 1015 -- 72 -- 154/84 -- -- -- -- GR    08/07/24 1012 -- 75 16 115/73 95 % -- None (Room air) --     08/07/24 1006 -- 73 16 99/85 95 % -- None (Room air) -- CS    08/07/24 1002 -- 65 17 154/82 94 % -- None (Room air) --     08/07/24 1000 98 °F (36.7 °C) 65 19 157/84 95 % -- None (Room air) --     08/07/24 0506 98 °F (36.7 °C) -- 18 -- 97 % -- None (Room air) -- AP    08/07/24 0506 -- 58 -- 125/73 -- 172 lb 8 oz (78.2 kg) -- -- MO    08/07/24 0300 -- 46 -- -- -- -- -- -- GT          CIWA Scores (since admission)       None

## 2024-08-08 NOTE — PLAN OF CARE
Problem: Patient Centered Care  Goal: Patient preferences are identified and integrated in the patient's plan of care  Description: Interventions:  - What would you like us to know as we care for you? Im from home   Problem: Patient/Family Goals  Goal: Patient/Family Long Term Goal  Description: Patient's Long Term Goal: discharge home    Interventions:  - Monitor vital signs  - Neurological assessments  - Orthostatics  - Ambulate as tolerated  - See additional Care Plan goals for specific interventions  Outcome: Progressing  Goal: Patient/Family Short Term Goal  Description: Patient's Short Term Goal: feel better    Interventions:   - Ambulate as tolerated  - Orthostatic education  - Continue ADLs  - See additional Care Plan goals for specific interventions  Outcome: Progressing     Problem: CARDIOVASCULAR - ADULT  Goal: Maintains optimal cardiac output and hemodynamic stability  Description: INTERVENTIONS:  - Monitor vital signs, rhythm, and trends  - Monitor for bleeding, hypotension and signs of decreased cardiac output  - Evaluate effectiveness of vasoactive medications to optimize hemodynamic stability  - Monitor arterial and/or venous puncture sites for bleeding and/or hematoma  - Assess quality of pulses, skin color and temperature  - Assess for signs of decreased coronary artery perfusion - ex. Angina  - Evaluate fluid balance, assess for edema, trend weights  Outcome: Progressing  Goal: Absence of cardiac arrhythmias or at baseline  Description: INTERVENTIONS:  - Continuous cardiac monitoring, monitor vital signs, obtain 12 lead EKG if indicated  - Evaluate effectiveness of antiarrhythmic and heart rate control medications as ordered  - Initiate emergency measures for life threatening arrhythmias  - Monitor electrolytes and administer replacement therapy as ordered  Outcome: Progressing     Problem: SAFETY ADULT - FALL  Goal: Free from fall injury  Description: INTERVENTIONS:  - Assess pt frequently for  physical needs  - Identify cognitive and physical deficits and behaviors that affect risk of falls.  - [place] fall precautions as indicated by assessment.  - Educate pt/family on patient safety including physical limitations  - Instruct pt to call for assistance with activity based on assessment  - Modify environment to reduce risk of injury  - Provide assistive devices as appropriate  - Consider OT/PT consult to assist with strengthening/mobility  - Encourage toileting schedule  Outcome: Progressing     Problem: NEUROLOGICAL - ADULT  Goal: Achieves stable or improved neurological status  Description: INTERVENTIONS  - Assess for and report changes in neurological status  - Initiate measures to prevent increased intracranial pressure  - Maintain blood pressure and fluid volume within ordered parameters to optimize cerebral perfusion and minimize risk of hemorrhage  - Monitor temperature, glucose, and sodium. Initiate appropriate interventions as ordered  Outcome: Progressing     - Provide timely, complete, and accurate information to patient/family  - Incorporate patient and family knowledge, values, beliefs, and cultural backgrounds into the planning and delivery of care  - Encourage patient/family to participate in care and decision-making at the level they choose  - Honor patient and family perspectives and choices  Outcome: Progressing

## 2024-08-09 VITALS
HEART RATE: 55 BPM | WEIGHT: 172.38 LBS | BODY MASS INDEX: 23 KG/M2 | DIASTOLIC BLOOD PRESSURE: 69 MMHG | OXYGEN SATURATION: 96 % | TEMPERATURE: 98 F | RESPIRATION RATE: 18 BRPM | SYSTOLIC BLOOD PRESSURE: 135 MMHG

## 2024-08-09 PROBLEM — R29.6 RECURRENT FALLS: Status: RESOLVED | Noted: 2024-08-02 | Resolved: 2024-08-09

## 2024-08-09 PROBLEM — E86.0 DEHYDRATION: Status: RESOLVED | Noted: 2024-08-01 | Resolved: 2024-08-09

## 2024-08-09 PROBLEM — I95.1 ORTHOSTATIC INTOLERANCE: Status: RESOLVED | Noted: 2024-08-02 | Resolved: 2024-08-09

## 2024-08-09 LAB
ANION GAP SERPL CALC-SCNC: 7 MMOL/L (ref 0–18)
BASOPHILS # BLD AUTO: 0.02 X10(3) UL (ref 0–0.2)
BASOPHILS NFR BLD AUTO: 0.5 %
BUN BLD-MCNC: 22 MG/DL (ref 9–23)
BUN/CREAT SERPL: 15 (ref 10–20)
CALCIUM BLD-MCNC: 9.4 MG/DL (ref 8.7–10.4)
CHLORIDE SERPL-SCNC: 108 MMOL/L (ref 98–112)
CO2 SERPL-SCNC: 24 MMOL/L (ref 21–32)
CREAT BLD-MCNC: 1.47 MG/DL
DEPRECATED RDW RBC AUTO: 47.7 FL (ref 35.1–46.3)
EGFRCR SERPLBLD CKD-EPI 2021: 47 ML/MIN/1.73M2 (ref 60–?)
EOSINOPHIL # BLD AUTO: 0.2 X10(3) UL (ref 0–0.7)
EOSINOPHIL NFR BLD AUTO: 5 %
ERYTHROCYTE [DISTWIDTH] IN BLOOD BY AUTOMATED COUNT: 14.3 % (ref 11–15)
GLUCOSE BLD-MCNC: 94 MG/DL (ref 70–99)
HCT VFR BLD AUTO: 34 %
HGB BLD-MCNC: 11.8 G/DL
IMM GRANULOCYTES # BLD AUTO: 0.03 X10(3) UL (ref 0–1)
IMM GRANULOCYTES NFR BLD: 0.8 %
LYMPHOCYTES # BLD AUTO: 1.37 X10(3) UL (ref 1–4)
LYMPHOCYTES NFR BLD AUTO: 34.3 %
MCH RBC QN AUTO: 31.7 PG (ref 26–34)
MCHC RBC AUTO-ENTMCNC: 34.7 G/DL (ref 31–37)
MCV RBC AUTO: 91.4 FL
MONOCYTES # BLD AUTO: 0.51 X10(3) UL (ref 0.1–1)
MONOCYTES NFR BLD AUTO: 12.8 %
NEUTROPHILS # BLD AUTO: 1.87 X10 (3) UL (ref 1.5–7.7)
NEUTROPHILS # BLD AUTO: 1.87 X10(3) UL (ref 1.5–7.7)
NEUTROPHILS NFR BLD AUTO: 46.6 %
OSMOLALITY SERPL CALC.SUM OF ELEC: 291 MOSM/KG (ref 275–295)
PLATELET # BLD AUTO: 167 10(3)UL (ref 150–450)
POTASSIUM SERPL-SCNC: 4.1 MMOL/L (ref 3.5–5.1)
RBC # BLD AUTO: 3.72 X10(6)UL
SODIUM SERPL-SCNC: 139 MMOL/L (ref 136–145)
WBC # BLD AUTO: 4 X10(3) UL (ref 4–11)

## 2024-08-09 RX ORDER — OFLOXACIN 3 MG/ML
2 SOLUTION/ DROPS OPHTHALMIC 4 TIMES DAILY
Qty: 10 ML | Refills: 0 | Status: SHIPPED | OUTPATIENT
Start: 2024-08-09 | End: 2024-08-16

## 2024-08-09 NOTE — PROGRESS NOTES
[facility]  part of [facility]    Cardiology Progress Note    David Silvestre Patient Status:  Observation    2/15/1940 MRN [id number]   Location [facility] 3W/SW Attending Jailene Galindo MD   Hosp Day # 1 PCP Rome Morgan MD       Impression/Plan:  84 year old male presenting with:     Syncope and Presyncope, +orthostatics  - neuro workup ongoing, MRI brain negative for acute process.   - EKG, echo with no acute changes   CAD s/p CABG 5v   s/p JACOB  SVG RCA  JOHANA on CKD  HL, can only tolerate 1-2 days per week on statin  HTN, has had recent med changes     - Continue present BP meds for now (carvedilol 6.25mg bid); will need to allow permissive supine HTN to avoid orthostatic hypotension. Over the past year has had multiple med changes up and down. Suggest keeping BP on higher end to avoid syncope. Anxiety also playing a role in keeping BP elevated when it is being measured. Trying to avoid midodrine, which would worsen supine HTN.  - Cont asa, plavix, zetia  - Alternate statin/PCSK9i to be determined as outpatient  - PT/OT can help with orthostasis  - Monitor rhythm on telemetry  - discussed with daughter yesterday. She would feel better if BP more stable at least a day before him going home       Patient Dr Means      Subjective:     No chest pain, dyspnea, lightheadedness. Quiet overnight.        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)    Coronary artery disease involving coronary bypass graft of native heart without angina pectoris    Atherosclerosis of aorta (HCC)    Ischemic cardiomyopathy     Diastolic dysfunction    Hypertensive heart and chronic kidney disease with heart failure and stage 1 through stage 4 chronic kidney disease, or unspecified chronic kidney disease (HCC)    Brain atrophy (HCC)    Anemia, unspecified type    Peptic ulcer disease    Stage 3 chronic kidney disease, unspecified whether stage 3a or 3b CKD (HCC)    Mood disorder (HCC)    Syncope, near    Dehydration    Memory deficits    Orthostatic intolerance    Recurrent falls    TIA (transient ischemic attack)    Toxic metabolic encephalopathy    Cerebral atrophy (HCC)       Objective:   Temp: 98 °F (36.7 °C)  Pulse: 55  Resp: 17  BP: 150/81    Intake/Output:     Intake/Output Summary (Last 24 hours) at 8/9/2024 0637  Last data filed at 8/8/2024 2023  Gross per 24 hour   Intake 1200 ml   Output --   Net 1200 ml       Last 3 Weights   08/09/24 0632 172 lb 6.4 oz (78.2 kg)   08/08/24 0404 176 lb 12.8 oz (80.2 kg)   08/07/24 0506 172 lb 8 oz (78.2 kg)   08/06/24 0510 172 lb 6.4 oz (78.2 kg)   08/05/24 0650 173 lb 11.2 oz (78.8 kg)   08/04/24 0550 174 lb 8 oz (79.2 kg)   08/01/24 2353 173 lb 14.4 oz (78.9 kg)   01/22/24 1247 160 lb (72.6 kg)   03/16/22 0806 162 lb 11.2 oz (73.8 kg)       Tele: SR, PACs    Physical Exam:    General: awake, alert, oriented x 3, no acute distress  HEENT: at/nc, perrl, eomi  Neck: supple  Cardiac: Regular rate and rhythm, S1, S2 normal, no murmur, rub or gallop.  Lungs: Clear without wheezes, rales, rhonchi or dullness.  Normal excursions and effort.  Abdomen: Soft, non-distended  Extremities: Without clubbing, cyanosis or edema.    Neurologic: Alert and oriented, normal affect.  Psych: normal mood and affect  Skin: Warm and dry.     Laboratory/Data:    Labs:         Recent Labs   Lab 08/02/24  1454 08/09/24  0531   WBC 3.0* 4.0   HGB 12.1* 11.8*   MCV 93.4 91.4   .0 167.0   INR 1.06  --        Recent Labs   Lab 08/02/24  1454      K 4.5      CO2 25.0   BUN 20   CREATSERUM 1.42*   CA 9.8    GLU 86       Recent Labs   Lab 08/02/24  1454   ALT 13   AST 17   ALB 4.2       No results for input(s): \"TROP\" in the last 168 hours.          Echo:   Conclusions:     1. Left ventricle: The cavity size was normal. Wall thickness was normal.      Systolic function was at the lower limits of normal. The estimated      ejection fraction was 50%, by biplane method of disks. Hypokinesis of the      apicalinferior wall. Doppler parameters are consistent with abnormal left      ventricular relaxation - grade 1 diastolic dysfunction.   2. Left atrium: The atrium was mildly enlarged.   3. Mitral valve: There was mild regurgitation.   4. Tricuspid valve: There was mild regurgitation.   5. Pulmonary arteries: Systolic pressure was mildly increased, estimated to      be 41mm Hg.           CATH 3/23:  · Severe native multivessel disease. Patent LIMA-LAD, SVG-D1, and SVG-OM1.   There is a severe stenosis in the proximal SVG-RCA. The sequential portion   of the graft to the RPDA is occluded. The limb to the RPL is patent.   · Successful PCI of the proximal SVG-RCA with 3.5x30mm Giacomo JACOB x 1.     1) ASA 81mg and clopidogrel 75mg daily.   2) Post procedural IVF in the setting of CKD   3) Cardiology inpatient service to follow.     Coronary Findings Diagnostic Dominance: Right   Left Main: Ost LM lesion with 30% stenosis.   Left Anterior Descending: Prox LAD to Mid LAD lesion with 95% stenosis. Partially in stent. First Diagonal Branch: 1st Diag lesion with 100% stenosis. The vessel is total chronically occluded.   Left Circumflex: Patent stents from the mid LCX into OM2. First Obtuse Marginal Branch: 1st Mrg lesion with 100% stenosis. The vessel is total chronically occluded.   Right Coronary Artery: Mid RCA lesion with 100% stenosis. The vessel is total chronically occluded.   Graft To 1st Mrg: The graft is free of disease.   Graft To Dist RCA: There is occlusion of the Y-portion of the graft to the RPDA. The portion to the RPL  is patent. The proximal main graft has a severe stenosis. Prox Graft lesion with 80% stenosis.   Graft To 1st Diag: The graft exhibits minimal (20%) luminal irregularities.   LIMA Graft To Mid LAD: The graft is free of disease.     Intervention   Prox Graft lesion (Graft To Dist RCA): Stent: Post-Intervention Lesion Assessment: Pre-intervention KIZZY flow: 3. Post-intervention KIZZY flow is 3. There is a 0% residual stenosis post intervention.           Allergies:   Allergies   Allergen Reactions    Thorazine [Chlorpromazine] CONFUSION and OTHER (SEE COMMENTS)     Per pt cva symptoms    Crestor [Rosuvastatin] PAIN and MYALGIA     Joint pain    Nexletol [Bempedoic Acid] OTHER (SEE COMMENTS)     Reportedly knee pain and variable BP fluctuation     Pravachol [Pravastatin] PAIN and MYALGIA     Joint pain     Repatha [Evolocumab] DIARRHEA and MYALGIA          Zocor [Simvastatin] PAIN and MYALGIA     joint    Imdur [Isosorbide] OTHER (SEE COMMENTS)     Head ache    Praluent [Alirocumab] DIARRHEA    Statins OTHER (SEE COMMENTS)     Per patient after 2-4 days the medication affects knees and patient is unable to walk.    Sulfa Antibiotics RASH       Medications:  Current Facility-Administered Medications   Medication Dose Route Frequency    glycerin-hypromellose- (Artificial Tears) 0.2-0.2-1 % ophthalmic solution 1 drop  1 drop Both Eyes QID PRN    pancrelipase (Lip-Prot-Amyl) (Zenpep) DR particles cap 10,000 Units  10,000 Units Oral See Admin Instructions    sodium chloride 0.9 % IV bolus 1,000 mL  1,000 mL Intravenous Once    carvedilol (Coreg) tab 6.25 mg  6.25 mg Oral BID with meals    acetaminophen (Tylenol) tab 650 mg  650 mg Oral Q4H PRN    Or    acetaminophen (Tylenol) rectal suppository 650 mg  650 mg Rectal Q4H PRN    ondansetron (Zofran) 4 MG/2ML injection 4 mg  4 mg Intravenous Q6H PRN    metoclopramide (Reglan) 5 mg/mL injection 5 mg  5 mg Intravenous Q8H PRN    clopidogrel (Plavix) tab 75 mg  75 mg Oral  Daily    ibuprofen (Motrin) tab 400 mg  400 mg Oral TID PRN    aspirin DR tab 81 mg  81 mg Oral Daily    ezetimibe (Zetia) tab 10 mg  10 mg Oral Nightly    pantoprazole (Protonix) DR tab 40 mg  40 mg Oral BID    heparin (Porcine) 5000 UNIT/ML injection 5,000 Units  5,000 Units Subcutaneous Q8H JUSTICE

## 2024-08-09 NOTE — PLAN OF CARE
Problem: Patient Centered Care  Goal: Patient preferences are identified and integrated in the patient's plan of care  Description: Interventions:  - What would you like us to know as we care for you? Im from home   - Provide timely, complete, and accurate information to patient/family  - Incorporate patient and family knowledge, values, beliefs, and cultural backgrounds into the planning and delivery of care  - Encourage patient/family to participate in care and decision-making at the level they choose  - Honor patient and family perspectives and choices  Outcome: Progressing     Problem: Patient/Family Goals  Goal: Patient/Family Long Term Goal  Description: Patient's Long Term Goal: discharge home    Interventions:  - Monitor vital signs  - Neurological assessments  - Orthostatics  - Ambulate as tolerated  - See additional Care Plan goals for specific interventions  Outcome: Progressing  Goal: Patient/Family Short Term Goal  Description: Patient's Short Term Goal: feel better    Interventions:   - Ambulate as tolerated  - Orthostatic education  - Continue ADLs  - See additional Care Plan goals for specific interventions  Outcome: Progressing     Problem: CARDIOVASCULAR - ADULT  Goal: Maintains optimal cardiac output and hemodynamic stability  Description: INTERVENTIONS:  - Monitor vital signs, rhythm, and trends  - Monitor for bleeding, hypotension and signs of decreased cardiac output  - Evaluate effectiveness of vasoactive medications to optimize hemodynamic stability  - Monitor arterial and/or venous puncture sites for bleeding and/or hematoma  - Assess quality of pulses, skin color and temperature  - Assess for signs of decreased coronary artery perfusion - ex. Angina  - Evaluate fluid balance, assess for edema, trend weights  Outcome: Progressing  Goal: Absence of cardiac arrhythmias or at baseline  Description: INTERVENTIONS:  - Continuous cardiac monitoring, monitor vital signs, obtain 12 lead EKG if  indicated  - Evaluate effectiveness of antiarrhythmic and heart rate control medications as ordered  - Initiate emergency measures for life threatening arrhythmias  - Monitor electrolytes and administer replacement therapy as ordered  Outcome: Progressing     Problem: SAFETY ADULT - FALL  Goal: Free from fall injury  Description: INTERVENTIONS:  - Assess pt frequently for physical needs  - Identify cognitive and physical deficits and behaviors that affect risk of falls.  - [place] fall precautions as indicated by assessment.  - Educate pt/family on patient safety including physical limitations  - Instruct pt to call for assistance with activity based on assessment  - Modify environment to reduce risk of injury  - Provide assistive devices as appropriate  - Consider OT/PT consult to assist with strengthening/mobility  - Encourage toileting schedule  Outcome: Progressing     Problem: NEUROLOGICAL - ADULT  Goal: Achieves stable or improved neurological status  Description: INTERVENTIONS  - Assess for and report changes in neurological status  - Initiate measures to prevent increased intracranial pressure  - Maintain blood pressure and fluid volume within ordered parameters to optimize cerebral perfusion and minimize risk of hemorrhage  - Monitor temperature, glucose, and sodium. Initiate appropriate interventions as ordered  Outcome: Progressing

## 2024-08-09 NOTE — PLAN OF CARE
Problem: Patient Centered Care  Goal: Patient preferences are identified and integrated in the patient's plan of care  Description: Interventions:  - Provide timely, complete, and accurate information to patient/family  - Incorporate patient and family knowledge, values, beliefs, and cultural backgrounds into the planning and delivery of care  - Encourage patient/family to participate in care and decision-making at the level they choose  - Honor patient and family perspectives and choices  Outcome: Progressing     Problem: CARDIOVASCULAR - ADULT  Goal: Maintains optimal cardiac output and hemodynamic stability  Description: INTERVENTIONS:  - Monitor vital signs, rhythm, and trends  - Monitor for bleeding, hypotension and signs of decreased cardiac output  - Evaluate effectiveness of vasoactive medications to optimize hemodynamic stability  - Monitor arterial and/or venous puncture sites for bleeding and/or hematoma  - Assess quality of pulses, skin color and temperature  - Assess for signs of decreased coronary artery perfusion - ex. Angina  - Evaluate fluid balance, assess for edema, trend weights  Outcome: Progressing  Goal: Absence of cardiac arrhythmias or at baseline  Description: INTERVENTIONS:  - Continuous cardiac monitoring, monitor vital signs, obtain 12 lead EKG if indicated  - Evaluate effectiveness of antiarrhythmic and heart rate control medications as ordered  - Initiate emergency measures for life threatening arrhythmias  - Monitor electrolytes and administer replacement therapy as ordered  Outcome: Progressing     Problem: SAFETY ADULT - FALL  Goal: Free from fall injury  Description: INTERVENTIONS:  - Assess pt frequently for physical needs  - Identify cognitive and physical deficits and behaviors that affect risk of falls.  - [place] fall precautions as indicated by assessment.  - Educate pt/family on patient safety including physical limitations  - Instruct pt to call for assistance with  activity based on assessment  - Modify environment to reduce risk of injury  - Provide assistive devices as appropriate  - Consider OT/PT consult to assist with strengthening/mobility  - Encourage toileting schedule  Outcome: Progressing     Problem: NEUROLOGICAL - ADULT  Goal: Achieves stable or improved neurological status  Description: INTERVENTIONS  - Assess for and report changes in neurological status  - Initiate measures to prevent increased intracranial pressure  - Maintain blood pressure and fluid volume within ordered parameters to optimize cerebral perfusion and minimize risk of hemorrhage  - Monitor temperature, glucose, and sodium. Initiate appropriate interventions as ordered  Outcome: Progressing

## 2024-08-09 NOTE — PLAN OF CARE
Pt A&Ox4, pt RA, pt medically clear for DC, pt to be DC home with daughter, PIV and Tele removed, Dc instructions given to pt and daughter.   Problem: Patient Centered Care  Goal: Patient preferences are identified and integrated in the patient's plan of care  Description: Interventions:  - What would you like us to know as we care for you? Im from home   - Provide timely, complete, and accurate information to patient/family  - Incorporate patient and family knowledge, values, beliefs, and cultural backgrounds into the planning and delivery of care  - Encourage patient/family to participate in care and decision-making at the level they choose  - Honor patient and family perspectives and choices  Outcome: Adequate for Discharge     Problem: Patient/Family Goals  Goal: Patient/Family Long Term Goal  Description: Patient's Long Term Goal: discharge home    Interventions:  - Monitor vital signs  - Neurological assessments  - Orthostatics  - Ambulate as tolerated  - See additional Care Plan goals for specific interventions  Outcome: Adequate for Discharge  Goal: Patient/Family Short Term Goal  Description: Patient's Short Term Goal: feel better    Interventions:   - Ambulate as tolerated  - Orthostatic education  - Continue ADLs  - See additional Care Plan goals for specific interventions  Outcome: Adequate for Discharge     Problem: CARDIOVASCULAR - ADULT  Goal: Maintains optimal cardiac output and hemodynamic stability  Description: INTERVENTIONS:  - Monitor vital signs, rhythm, and trends  - Monitor for bleeding, hypotension and signs of decreased cardiac output  - Evaluate effectiveness of vasoactive medications to optimize hemodynamic stability  - Monitor arterial and/or venous puncture sites for bleeding and/or hematoma  - Assess quality of pulses, skin color and temperature  - Assess for signs of decreased coronary artery perfusion - ex. Angina  - Evaluate fluid balance, assess for edema, trend weights  Outcome:  Adequate for Discharge  Goal: Absence of cardiac arrhythmias or at baseline  Description: INTERVENTIONS:  - Continuous cardiac monitoring, monitor vital signs, obtain 12 lead EKG if indicated  - Evaluate effectiveness of antiarrhythmic and heart rate control medications as ordered  - Initiate emergency measures for life threatening arrhythmias  - Monitor electrolytes and administer replacement therapy as ordered  Outcome: Adequate for Discharge     Problem: SAFETY ADULT - FALL  Goal: Free from fall injury  Description: INTERVENTIONS:  - Assess pt frequently for physical needs  - Identify cognitive and physical deficits and behaviors that affect risk of falls.  - [place] fall precautions as indicated by assessment.  - Educate pt/family on patient safety including physical limitations  - Instruct pt to call for assistance with activity based on assessment  - Modify environment to reduce risk of injury  - Provide assistive devices as appropriate  - Consider OT/PT consult to assist with strengthening/mobility  - Encourage toileting schedule  Outcome: Adequate for Discharge     Problem: NEUROLOGICAL - ADULT  Goal: Achieves stable or improved neurological status  Description: INTERVENTIONS  - Assess for and report changes in neurological status  - Initiate measures to prevent increased intracranial pressure  - Maintain blood pressure and fluid volume within ordered parameters to optimize cerebral perfusion and minimize risk of hemorrhage  - Monitor temperature, glucose, and sodium. Initiate appropriate interventions as ordered  Outcome: Adequate for Discharge

## 2024-08-12 LAB — METHYLMALONIC ACID: 188 NMOL/L

## 2024-08-15 NOTE — PAYOR COMM NOTE
--------------  CONTINUED STAY REVIEW    Payor: [employer]  Subscriber #:  [id number]  Authorization Number: [id number]    Admit date: 8/8/24  Admit time: 11:30 AM      PLEASE REVIEW ADDITIONAL CLINICAL INFORMATION. REQUESTING RECONSIDERATION OF INPATIENT STATUS. THANK YOU.      Admit Orders (From admission, onward)       Start        08/08/24 1130  Admit to inpatient Once  Once                08/02/24 0008  Place in observation Once                         8/8/24 8/8/2024  3:50 AM        On call Nocturnist 08/08/24     RRT called to room      On my arrival patient complaining of seeing flashes of light, numbness and tingling in right arm and left face that resolved by the time I came to see him.  As per RN blood pressure in the 230s systolic.  Patient admitted with postural hypotension.  There have been episodes where his systolic pressure has dropped down to 90     On exam  Temperature 97.8  Pulse 58  Respiration 16  /104  Pulse ox 100% on room air  Alert oriented in no distress  Chest clear  Heart bradycardic  Abdomen soft  Extremities no edema     Assessment and plan     Accelerated hypertension  Considering frequent drops in blood pressure for now we will only give 5 mg of amlodipine.  Monitor closely.     Postural hypotension  Patient dropped his systolic pressure by 60 mm when he stood up however not tachycardic, complaining of dizziness.  KAHLIL hose stockings placed, along with abdominal binder.  Patient may benefit from waist high stockings and possibly Florinef.                  INTERNAL MEDICINE  Assessment and Plan:  This is a 84-year-old male with history of coronary artery disease status post CABG 2014 then PCI in 2023, CKD, hypertension, hyperlipidemia, presents to the hospital for evaluation of multiple falls/syncope/presyncope attributed to hayden with orthostasis s/p IVF. Despite fluid hesitation patient continues to have issues with orthostasis and baseline high blood pressures at this  time cardiology is recommending to allow permissive hypertension in the setting of orthostasis. Code stroke called with dysarthria/confusion/facial droop, seen by neurology with negative CTA brain and MRI, pt being tx for possible tia vs encephalopathy, will need neurology follow up. Plans for outpt zio monitor,  bp check and carotid US. [facility] to follow pt at discharge.  Plan was to discharge today however patient had  this morning, plans for home on Friday if bp stable for 24 hours, norvasc was added. Pt has appointments for pcp 8/14, cards 8/21 and neurology 9/3, below for details      Falls/Syncope/Pre Syncope  HTN with Orthostasic Hypotension    -EKG negative for acute ischemia. Troponin normal  -Echo with 50% with WMA, grade1 DD, mild MR and mild TR  -bp meds adjusted, per cards want pt to have higher bp in setting of orthostasis.Norvasc added last night, continue Coreg  -s/p IVF  -Patient dose not want to wear abdominal binder and thigh-high JAYSON hose, he prefers knee high jayson hose   -encouraged ambulation  -outpt bp check and  zio monitor  -as per cards      Global Amnesia/? TIA/Encephalopathy  -code stroke called during admission  -CTA brain and neck- no acute infarct/hemorrhage and B/L carotids with less than 50%  -MRI brain negative for acute process, mild microvascular white matter changes  -vitamin b and copper level pending  -negative for syphilis  -TSH normal  -EEG with global encephalopathy, no seizures   -risk factor modification  -continue zetia (consider PCSK9 vs alternate statin), plavix and ASA  -outpt zio monitor with cards  -follow up with neurology as outpt with assessment of cognitive/memory-Dr Leiva 9/3     JOHANA on CKD Stage 3-now back at baseline   -baseline cr 1.4-1.6, admission Cr 2 now 1.4    Carotid Disease- B/L  -CTA neck with less than 50% ICA stenosis   -outpt carotid US     CAD S/P CABG and PCI  HL-statin intolerant  HTN  -continue asa and zetia   -home toprol and  procardia stopped, now on coreg  -? PCSK9 vs alternate statin, defer to usual cardiologist  -follow up BP check with cards  -follow up with Dr Means 8/21      MICHELLE bryson in am  -diet-cardiac      Prophy  -SCD  -heparin       SUBJECTIVE:   Events of last night noted.  Patient stated he was feeling unwell and they came in and his blood pressure was 230, he was given amlodipine.  Plans for ambulation and continued blood pressure monitoring for stability for the next 24 hours with plans for discharge tomorrow     Blood pressure 147/75, pulse 59, temperature 97.9 °F (36.6 °C), temperature source Oral, resp. rate 19, weight 176 lb 12.8 oz (80.2 kg), SpO2 97%.     NEURO: A/A Ox3  RESP: non labored, CTA  CARDIO: Regular, no murmur  ABD: soft, NT, ND, BS+  EXTREMITIES: no edema  Lab 08/01/24 1920 08/02/24  0631 08/02/24  1454   WBC 4.7 3.4* 3.0*   HGB 12.4* 11.5* 12.1*   MCV 92.0 93.1 93.4   .0 157.0 161.0   INR  --   --  1.06      Lab 08/01/24  1920 08/02/24  0631 08/02/24  1454    141 140   K 4.2 4.3 4.5    111 110   CO2 22.0 24.0 25.0   BUN 26* 21 20   CREATSERUM 2.01* 1.64* 1.42*   CA 8.8 9.0 9.8   MG 2.2  --   --    * 94 86      Lab 08/02/24  1454   ALT 13   AST 17   ALB 4.2      Lab 08/05/24  0607 08/05/24  1246 08/05/24  1751 08/05/24  2359 08/06/24  0644   PGLU 99 105* 95 107* 92       MEDICATIONS       lipase-protease-amylase (Lip-Prot-Amyl)  10,000 Units Oral See Admin Instructions    sodium chloride  1,000 mL Intravenous Once    carvedilol  6.25 mg Oral BID with meals    clopidogrel  75 mg Oral Daily    aspirin  81 mg Oral Daily    ezetimibe  10 mg Oral Nightly    pantoprazole  40 mg Oral BID    heparin  5,000 Units Subcutaneous Q8H [facility]         Discharge Summary   Discharge date: 8/9/2024  Reason for admission  Per H/P Dated 8/2/2024 by Dr Ramsey   This is a 84-year-old male with history of coronary artery disease status post CABG 2014 then PCI in 2023, CKD, hypertension, hyperlipidemia,  presents to the hospital for evaluation of multiple falls.  The patient lives at home with family however they are out of town on vacation.  The patient states the last few days he noticed that he has had a few falls at home.  Yesterday he states he could not get up from the fall and EMS was called.  Yesterday morning he felt like he could barely sit up in a chair.  The patient denies any chest pain, palpitations, or trouble breathing.  He has had multiple changes to his blood pressure medicines in the recent past.  He has noted symptoms of lightheadedness even as an outpatient and some medications were stopped.  He also tells me that he feels like his speech is not normal.  No trouble swallowing.  No focal weakness.  He denies any paresthesias.     Hospital Course:   This is a 84-year-old male with history of coronary artery disease status post CABG 2014 then PCI in 2023, CKD, hypertension, hyperlipidemia, presents to the hospital for evaluation of multiple falls/syncope/presyncope attributed to hayden with orthostasis s/p IVF. Despite fluid hesitation patient continues to have issues with orthostasis and baseline high blood pressures at this time cardiology is recommending to allow permissive hypertension in the setting of orthostasis. Code stroke called with dysarthria/confusion/facial droop, seen by neurology with negative CTA brain and MRI, pt being tx for possible tia vs encephalopathy, will need neurology follow up. Plans for outpt zio monitor,  bp check/Work up and carotid US. Prior to discharge pt with possible left eye conjunctivitis likely viral but will tx for bacterial just in case, pt to call pcp or go to [facility] this weekend if things change.  Pt discharged with [facility] and appointments for pcp 8/14, cards 8/21 and neurology 9/3, below for details      Falls/Syncope/Pre Syncope  HTN w/Orthostasic Hypotension   -EKG negative for acute ischemia. Troponin normal  -Echo with 50% with WMA, grade1 DD, mild  MR and mild TR  -bp meds adjusted, per cards want pt to have higher bp in setting of orthostasis  -s/p IVF  -Patient dose not want to wear abdominal binder and thigh-high KAHLIL hose, he prefers knee high kahlil hose   -encouraged ambulation  -outpt ambulatory bp check  -consideration for secondary work up of labile bp  -consideration for zio monitor  -cards appt 8/21     Global Amnesia/? TIA/Encephalopathy  -code stroke called during admission  -CTA brain and neck- no acute infarct/hemorrhage and B/L carotids with less than 50%  -MRI brain negative for acute process, mild microvascular white matter changes  -vitamin b normal and copper level low end of normal   -negative for syphilis  -TSH normal  -EEG with global encephalopathy, no seizures   -risk factor modification as per cards  -continue zetia (consider PCSK9 vs alternate statin), plavix and ASA  -follow up with neurology as outpt with assessment of cognitive/memory-Dr Leiva 9/3     JOHANA on CKD Stage 3-resolved   -baseline cr 1.4-1.6, admission Cr 2 now 1.47  -s/p IVF     Left Eye Conjunctivitis  -clear drainage  -concerns for viral syndrome  -will tx for bacterial conjunctivits just in case  -family notified to contact pcp or go [facility] this weekend if any changes     Pre DM  -A1C 5.9     Carotid Disease- B/L  -CTA neck with less than 50% ICA stenosis   -outpt carotid US     CAD S/P CABG and PCI  HL-statin intolerant  HTN  -continue asa and zetia   -home toprol and procardia stopped, now on coreg  -? PCSK9 vs alternate statin, defer to usual cardiologist  -follow up BP check with cards  -follow up with Dr Means 8/21     MA/MOLLY Reach  -ER Visits 2024: 1  -Admissions 2024: 1  -Re- Entry: no  -Consults: cards and neurology   -Discharge Needs:[facility]   -Appointments: [ x] PCP Rome Morgan MD - 8/14     EXAM:   GENERAL: no apparent distress  NEURO: A/A Ox3  RESP: non labored, CTA  CARDIO: Regular, no murmur  ABD: soft, NT, ND, BS+  EXTREMITIES: no edema          Vitals (last day) before discharge       Date/Time Temp Pulse Resp BP SpO2 Weight O2 Device O2 Flow Rate (L/min) [facility]    08/09/24 1017 -- 55 18 135/69 96 % -- -- --     08/09/24 0926 97.5 °F (36.4 °C) 60 16 123/80 94 % -- None (Room air) --     08/09/24 0536 98 °F (36.7 °C) 55 17 150/81 95 % -- None (Room air) --     08/08/24 2041 97.5 °F (36.4 °C) 54 16 125/65 94 % -- None (Room air) --     08/08/24 1832 -- 59 16 163/79 96 % -- None (Room air) --     08/08/24 1613 97.8 °F (36.6 °C) 55 18 153/78 96 % -- None (Room air) --     08/08/24 1603 -- -- -- 85/54 -- -- -- --     08/08/24 1558 -- -- -- 90/65 -- -- -- --     08/08/24 1556 -- -- -- 123/73 -- -- -- --     08/08/24 1532 -- -- -- 88/60 -- -- -- --     08/08/24 1530 -- 56 14 129/70 -- -- -- --     08/08/24 1005 -- 55 19 136/80 98 % -- None (Room air) --     08/08/24 1004 -- 52 17 136/80 98 % -- None (Room air) --     08/08/24 0451 97.9 °F (36.6 °C) 59 19 147/75 97 % -- None (Room air) --     08/08/24 0404 -- -- -- -- -- 176 lb 12.8 oz (80.2 kg) -- --     08/08/24 0317 -- -- -- 187/85 98 % -- -- --     08/08/24 0313 -- -- -- 170/81 -- -- -- --     08/08/24 0308 -- -- -- 214/94 -- -- -- --     08/08/24 0305 -- -- -- 219/101 -- -- -- --     08/08/24 0303 -- 64 -- -- -- -- -- -- MO    08/08/24 0302 -- -- -- 232/100 -- -- -- --     08/08/24 0300 -- 64 -- -- -- -- -- -- MO    08/08/24 0248 -- -- -- 219/99 -- -- -- --

## 2025-06-10 ENCOUNTER — HOSPITAL ENCOUNTER (EMERGENCY)
Facility: HOSPITAL | Age: 85
Discharge: HOME | End: 2025-06-10
Attending: EMERGENCY MEDICINE
Payer: MEDICARE

## 2025-06-10 ENCOUNTER — APPOINTMENT (OUTPATIENT)
Dept: CT IMAGING | Facility: HOSPITAL | Age: 85
End: 2025-06-10
Attending: EMERGENCY MEDICINE
Payer: MEDICARE

## 2025-06-10 VITALS
TEMPERATURE: 98 F | SYSTOLIC BLOOD PRESSURE: 172 MMHG | DIASTOLIC BLOOD PRESSURE: 83 MMHG | HEIGHT: 72 IN | BODY MASS INDEX: 22.35 KG/M2 | OXYGEN SATURATION: 99 % | WEIGHT: 165 LBS | HEART RATE: 56 BPM | RESPIRATION RATE: 12 BRPM

## 2025-06-10 DIAGNOSIS — I10 PRIMARY HYPERTENSION: Primary | ICD-10-CM

## 2025-06-10 DIAGNOSIS — R07.9 CHEST PAIN IN ADULT: ICD-10-CM

## 2025-06-10 LAB
ALBUMIN SERPL-MCNC: 4.3 G/DL (ref 3.2–4.8)
ALP LIVER SERPL-CCNC: 71 U/L (ref 45–117)
ALT SERPL-CCNC: 11 U/L (ref 10–49)
ANION GAP SERPL CALC-SCNC: 9 MMOL/L (ref 0–18)
AST SERPL-CCNC: 27 U/L (ref ?–34)
BASOPHILS # BLD AUTO: 0.02 X10(3) UL (ref 0–0.2)
BASOPHILS NFR BLD AUTO: 0.5 %
BILIRUB DIRECT SERPL-MCNC: <0.1 MG/DL (ref ?–0.3)
BILIRUB SERPL-MCNC: 0.3 MG/DL (ref 0.2–1.1)
BUN BLD-MCNC: 23 MG/DL (ref 9–23)
BUN/CREAT SERPL: 13.8 (ref 10–20)
CALCIUM BLD-MCNC: 9.6 MG/DL (ref 8.7–10.4)
CHLORIDE SERPL-SCNC: 107 MMOL/L (ref 98–112)
CO2 SERPL-SCNC: 25 MMOL/L (ref 21–32)
CREAT BLD-MCNC: 1.67 MG/DL (ref 0.7–1.3)
DEPRECATED RDW RBC AUTO: 47.1 FL (ref 35.1–46.3)
EGFRCR SERPLBLD CKD-EPI 2021: 40 ML/MIN/1.73M2 (ref 60–?)
EOSINOPHIL # BLD AUTO: 0.2 X10(3) UL (ref 0–0.7)
EOSINOPHIL NFR BLD AUTO: 4.5 %
ERYTHROCYTE [DISTWIDTH] IN BLOOD BY AUTOMATED COUNT: 14.1 % (ref 11–15)
GLUCOSE BLD-MCNC: 89 MG/DL (ref 70–99)
HCT VFR BLD AUTO: 35.9 % (ref 39–53)
HGB BLD-MCNC: 11.9 G/DL (ref 13–17.5)
IMM GRANULOCYTES # BLD AUTO: 0.01 X10(3) UL (ref 0–1)
IMM GRANULOCYTES NFR BLD: 0.2 %
LYMPHOCYTES # BLD AUTO: 1.14 X10(3) UL (ref 1–4)
LYMPHOCYTES NFR BLD AUTO: 25.7 %
MCH RBC QN AUTO: 30.1 PG (ref 26–34)
MCHC RBC AUTO-ENTMCNC: 33.1 G/DL (ref 31–37)
MCV RBC AUTO: 90.7 FL (ref 80–100)
MONOCYTES # BLD AUTO: 0.46 X10(3) UL (ref 0.1–1)
MONOCYTES NFR BLD AUTO: 10.4 %
NEUTROPHILS # BLD AUTO: 2.61 X10 (3) UL (ref 1.5–7.7)
NEUTROPHILS # BLD AUTO: 2.61 X10(3) UL (ref 1.5–7.7)
NEUTROPHILS NFR BLD AUTO: 58.7 %
OSMOLALITY SERPL CALC.SUM OF ELEC: 295 MOSM/KG (ref 275–295)
PLATELET # BLD AUTO: 229 10(3)UL (ref 150–450)
POTASSIUM SERPL-SCNC: 4.9 MMOL/L (ref 3.5–5.1)
PROT SERPL-MCNC: 6.6 G/DL (ref 5.7–8.2)
RBC # BLD AUTO: 3.96 X10(6)UL (ref 3.8–5.8)
SODIUM SERPL-SCNC: 141 MMOL/L (ref 136–145)
TROPONIN I SERPL HS-MCNC: 12 NG/L (ref ?–53)
TROPONIN I SERPL HS-MCNC: 12 NG/L (ref ?–53)
WBC # BLD AUTO: 4.4 X10(3) UL (ref 4–11)

## 2025-06-10 PROCEDURE — 93010 ELECTROCARDIOGRAM REPORT: CPT

## 2025-06-10 PROCEDURE — 80048 BASIC METABOLIC PNL TOTAL CA: CPT | Performed by: EMERGENCY MEDICINE

## 2025-06-10 PROCEDURE — 80076 HEPATIC FUNCTION PANEL: CPT | Performed by: EMERGENCY MEDICINE

## 2025-06-10 PROCEDURE — 70450 CT HEAD/BRAIN W/O DYE: CPT | Performed by: EMERGENCY MEDICINE

## 2025-06-10 PROCEDURE — 96376 TX/PRO/DX INJ SAME DRUG ADON: CPT

## 2025-06-10 PROCEDURE — 99285 EMERGENCY DEPT VISIT HI MDM: CPT

## 2025-06-10 PROCEDURE — 93005 ELECTROCARDIOGRAM TRACING: CPT

## 2025-06-10 PROCEDURE — 84484 ASSAY OF TROPONIN QUANT: CPT | Performed by: EMERGENCY MEDICINE

## 2025-06-10 PROCEDURE — 96374 THER/PROPH/DIAG INJ IV PUSH: CPT

## 2025-06-10 PROCEDURE — 85025 COMPLETE CBC W/AUTO DIFF WBC: CPT | Performed by: EMERGENCY MEDICINE

## 2025-06-10 RX ORDER — LORAZEPAM 0.5 MG/1
0.25 TABLET ORAL DAILY
COMMUNITY
Start: 2025-03-14

## 2025-06-10 RX ORDER — HYDRALAZINE HYDROCHLORIDE 20 MG/ML
5 INJECTION INTRAMUSCULAR; INTRAVENOUS ONCE
Status: COMPLETED | OUTPATIENT
Start: 2025-06-10 | End: 2025-06-10

## 2025-06-10 RX ORDER — DOXYCYCLINE 100 MG/1
100 CAPSULE ORAL 2 TIMES DAILY
COMMUNITY
Start: 2025-06-08 | End: 2025-06-18

## 2025-06-10 RX ORDER — PREDNISONE 20 MG/1
20 TABLET ORAL DAILY
COMMUNITY
Start: 2025-06-08

## 2025-06-10 NOTE — ED PROVIDER NOTES
Patient Seen in: [facility] Emergency Department        History  Chief Complaint   Patient presents with    Chest Pain Angina     Stated Complaint: dizziness, HTN    Subjective:   HPI            85-year-old male with CKD, CAD, hyperlipidemia, hypertension, who presents for evaluation of hypertension and chest pain.  He developed chest pain this morning associated with lightheadedness.  He checked his blood pressure noted that it was quite elevated.  He took nitroglycerin and aspirin at home prior to arrival.  Additionally he reports that he was recently diagnosed with a sinus infection and was started on prednisone and doxycycline.  He has pain and pressure at the left cheek radiating towards the head and a pulsating throbbing headache.      Objective:     Past Medical History:    BPH (benign prostatic hyperplasia)    CKD (chronic kidney disease)    Cold hands and feet    Coronary atherosclerosis of unspecified type of vessel, native or graft    Drug allergy    Hypertension    Hypervitaminosis D    Inguinal hernia without mention of obstruction or gangrene, unilateral or unspecified, (not specified as recurrent)    Mixed hyperlipidemia    Penicillin allergy    S/P coronary artery stent placement    Statin intolerance    Unspecified visual disturbance    Unspecified vitamin D deficiency    Vision abnormalities              Past Surgical History:   Procedure Laterality Date    Bypass surgery  1/28/2014    CABG x 5 LIMA-LAD, SVG- 2nd diagonal, 1st OM, Posterior descending & RCA    Cabg      5 vessel bypass    Colonoscopy  2/3/12    small adenoma. Repeat 2017.    Colonoscopy N/A 4/20/2018    adenoma- repeat 5 yrs    Other surgical history  11-29-11    flow/us Dr. Gibson    Other surgical history  1-31-12    flow/us-Dr. Gibson    Other surgical history  4/12    HERNIA SX @ [facility]    Other surgical history  11/15/13    cysto-Dr. Gibson                Social History     Socioeconomic History    Marital status:     Tobacco Use    Smoking status: Never    Smokeless tobacco: Never   Substance and Sexual Activity    Alcohol use: No     Alcohol/week: 0.0 standard drinks of alcohol    Drug use: No     Social Drivers of Health     Food Insecurity: No Food Insecurity (8/2/2024)    Food Insecurity     Food Insecurity: Never true   Transportation Needs: No Transportation Needs (8/2/2024)    Transportation Needs     Lack of Transportation: No   Housing Stability: Low Risk  (8/2/2024)    Housing Stability     Housing Instability: No                                Physical Exam    ED Triage Vitals [06/10/25 1118]   BP (!) 205/85   Pulse 59   Resp 16   Temp 97.6 °F (36.4 °C)   Temp src Oral   SpO2 100 %   O2 Device None (Room air)       Current Vitals:   Vital Signs  BP: (!) 172/83  Pulse: 56  Resp: 12  Temp: 97.6 °F (36.4 °C)  Temp src: Oral  MAP (mmHg): (!) 107    Oxygen Therapy  SpO2: 99 %  O2 Device: None (Room air)            Physical Exam  Vitals and nursing note reviewed.   Constitutional:       Appearance: He is well-developed.   HENT:      Head: Normocephalic and atraumatic.      Nose: Nose normal.      Mouth/Throat:      Mouth: Mucous membranes are moist.      Pharynx: Oropharynx is clear.   Eyes:      Extraocular Movements: Extraocular movements intact.   Cardiovascular:      Rate and Rhythm: Normal rate and regular rhythm.      Heart sounds: Normal heart sounds.   Pulmonary:      Effort: Pulmonary effort is normal.      Breath sounds: Normal breath sounds.   Abdominal:      General: There is no distension.      Palpations: Abdomen is soft.      Tenderness: There is no abdominal tenderness.   Musculoskeletal:         General: Normal range of motion.      Cervical back: Normal range of motion and neck supple. No tenderness.      Right lower leg: No edema.      Left lower leg: No edema.   Skin:     General: Skin is warm.   Neurological:      Mental Status: He is alert.      Cranial Nerves: Cranial nerves 2-12 are intact.       Sensory: Sensation is intact.      Motor: Motor function is intact.      Coordination: Coordination is intact.      Comments: No focal deficits       Differential diagnosis includes but is not limited to ICH, renal failure, ACS/MI, uncontrolled primary hypertension, less likely aortic dissection, medication side effect        ED Course  Labs Reviewed   BASIC METABOLIC PANEL (8) - Abnormal; Notable for the following components:       Result Value    Creatinine 1.67 (*)     eGFR-Cr 40 (*)     All other components within normal limits   CBC WITH DIFFERENTIAL WITH PLATELET - Abnormal; Notable for the following components:    HGB 11.9 (*)     HCT 35.9 (*)     RDW-SD 47.1 (*)     All other components within normal limits   HEPATIC FUNCTION PANEL (7) - Normal   TROPONIN I HIGH SENSITIVITY - Normal   TROPONIN I HIGH SENSITIVITY - Normal   RAINBOW DRAW BLUE     EKG    Rate, intervals and axes as noted on EKG Report.  Rate: 56  Rhythm: Sinus Rhythm  Reading: No STEMI, Q waves in inferior leads stable compared to prior EKG from 8/8/2024               Narrative   PROCEDURE: CT BRAIN OR HEAD (CPT=70450)     COMPARISON: [facility], MRI BRAIN (CPT=70551), 8/02/2024, 3:51 PM.     INDICATIONS: HTN, HA     TECHNIQUE: CT images were obtained without contrast material.  Automated exposure control for dose reduction was used.  Dose information is transmitted to the ACR (American College of Radiology) NRDR (National Radiology Data Registry) which includes the  Dose Index Registry.     FINDINGS: No skull fracture.  No hemorrhage or midline shift or hydrocephalus     CONCLUSION: No acute findings  Finalized by (CST): Tobias Ferrari MD on 6/10/2025 at 12:45 PM                    Medical Decision Making  The patient is well-appearing and ambulatory.  No focal neurologic deficits on my exam.  EKG without acute arrhythmia or ischemia.  BMP, CBC, troponin, LFTs unremarkable for emergent pathology.  He was treated with hydralazine  and there was improvement of his BP.  CT brain without ICH on my independent interpretation.  Advise discontinuation of prednisone as this may be causing elevated blood pressure, and PCP follow-up for blood pressure management and recheck.  Discussed with [facility] nurse coordinator to help facilitate PCP follow-up.    Problems Addressed:  Chest pain in adult: complicated acute illness or injury with systemic symptoms that poses a threat to life or bodily functions  Primary hypertension: chronic illness or injury with exacerbation, progression, or side effects of treatment    Amount and/or Complexity of Data Reviewed  Independent Historian: caregiver     Details: Family member at bedside reports that he was weaned off to previous BP medications due to orthostatic hypotension and syncope  Labs: ordered. Decision-making details documented in ED Course.  Radiology: ordered and independent interpretation performed. Decision-making details documented in ED Course.  ECG/medicine tests: ordered and independent interpretation performed. Decision-making details documented in ED Course.    Risk  Decision regarding hospitalization.  Risk Details: Admission discussed and offered        Disposition and Plan     Clinical Impression:  1. Primary hypertension    2. Chest pain in adult         Disposition:  Discharge  6/10/2025  3:20 pm    Follow-up:  Rome Morgan MD   [address] [address]  [phone]    Follow up  You will get a call from the nurse coordinator to schedule a follow-up appointment    We recommend that you schedule follow up care with a primary care provider within the next three months to obtain basic health screening including reassessment of your blood pressure.      Medications Prescribed:  Discharge Medication List as of 6/10/2025  3:45 PM                Supplementary Documentation:

## 2025-06-10 NOTE — DISCHARGE INSTRUCTIONS
Discontinue the prednisone.  Continue your carvedilol twice daily.  Tylenol as needed for facial pain.  It is very important to follow-up with your primary care doctor for blood pressure recheck and further management.    Return to the emergency department if you develop severe and persistent chest pain, difficulty breathing, dizziness, leg swelling, or if you are coughing up blood, as these can be signs of a medical emergency.  Please call your doctor for a follow-up appointment in 1 to 3 days to determine the need for further testing.

## 2025-06-10 NOTE — ED QUICK NOTES
Daughter arrived to bedside. Reviewed dc instructions with daughter as well, questions answered. Pt ambulated without difficulty.

## 2025-06-10 NOTE — ED INITIAL ASSESSMENT (HPI)
David arrives ambulatory through triage c/o chest pain, dizziness, HTN at PCP. Hx TIA. Strength equal bilaterally. L sided facial swelling d/t infection.    1 nitroglycerin, 325 aspirin taken at home.

## 2025-06-10 NOTE — ED QUICK NOTES
Rounding Completed    Plan of Care reviewed. Waiting for monitoring of BP.  Elimination needs assessed.  Provided reassurance. Family en route, pt anticipates discharge.     Bed is locked and in lowest position. Call light within reach.

## 2025-06-11 LAB
ATRIAL RATE: 56 BPM
P AXIS: 29 DEGREES
P-R INTERVAL: 174 MS
Q-T INTERVAL: 442 MS
QRS DURATION: 94 MS
QTC CALCULATION (BEZET): 426 MS
R AXIS: 27 DEGREES
T AXIS: 7 DEGREES
VENTRICULAR RATE: 56 BPM

## 2025-08-27 ENCOUNTER — APPOINTMENT (OUTPATIENT)
Dept: GENERAL RADIOLOGY | Facility: HOSPITAL | Age: 85
End: 2025-08-27
Attending: EMERGENCY MEDICINE

## 2025-08-27 ENCOUNTER — APPOINTMENT (OUTPATIENT)
Dept: CT IMAGING | Facility: HOSPITAL | Age: 85
End: 2025-08-27
Attending: Other

## 2025-08-27 ENCOUNTER — HOSPITAL ENCOUNTER (OUTPATIENT)
Facility: HOSPITAL | Age: 85
Setting detail: OBSERVATION
LOS: 1 days | Discharge: HOME | End: 2025-08-28
Attending: EMERGENCY MEDICINE | Admitting: HOSPITALIST

## 2025-08-27 ENCOUNTER — APPOINTMENT (OUTPATIENT)
Dept: MRI IMAGING | Facility: HOSPITAL | Age: 85
End: 2025-08-27
Attending: Other

## 2025-08-27 ENCOUNTER — APPOINTMENT (OUTPATIENT)
Dept: CT IMAGING | Facility: HOSPITAL | Age: 85
End: 2025-08-27
Attending: EMERGENCY MEDICINE

## 2025-08-27 DIAGNOSIS — R42 DIZZINESS: Primary | ICD-10-CM

## 2025-08-27 PROBLEM — R73.9 HYPERGLYCEMIA: Status: ACTIVE | Noted: 2025-08-27

## 2025-08-27 LAB
ANION GAP SERPL CALC-SCNC: 6 MMOL/L (ref 0–18)
BASOPHILS # BLD AUTO: 0.02 X10(3) UL (ref 0–0.2)
BASOPHILS NFR BLD AUTO: 0.7 %
BILIRUB UR QL: NEGATIVE
BUN BLD-MCNC: 17 MG/DL (ref 9–23)
BUN/CREAT SERPL: 11 (ref 10–20)
CALCIUM BLD-MCNC: 9.6 MG/DL (ref 8.7–10.4)
CHLORIDE SERPL-SCNC: 104 MMOL/L (ref 98–112)
CHOLEST SERPL-MCNC: 221 MG/DL (ref ?–200)
CLARITY UR: CLEAR
CO2 SERPL-SCNC: 27 MMOL/L (ref 21–32)
CREAT BLD-MCNC: 1.54 MG/DL (ref 0.7–1.3)
DEPRECATED RDW RBC AUTO: 46.2 FL (ref 35.1–46.3)
EGFRCR SERPLBLD CKD-EPI 2021: 44 ML/MIN/1.73M2 (ref 60–?)
EOSINOPHIL # BLD AUTO: 0.17 X10(3) UL (ref 0–0.7)
EOSINOPHIL NFR BLD AUTO: 5.8 %
ERYTHROCYTE [DISTWIDTH] IN BLOOD BY AUTOMATED COUNT: 13.9 % (ref 11–15)
EST. AVERAGE GLUCOSE BLD GHB EST-MCNC: 123 MG/DL (ref 68–126)
GLUCOSE BLD-MCNC: 101 MG/DL (ref 70–99)
GLUCOSE BLDC GLUCOMTR-MCNC: 109 MG/DL (ref 70–99)
GLUCOSE BLDC GLUCOMTR-MCNC: 82 MG/DL (ref 70–99)
GLUCOSE UR-MCNC: NORMAL MG/DL
HBA1C MFR BLD: 5.9 % (ref ?–5.7)
HCT VFR BLD AUTO: 36.8 % (ref 39–53)
HDLC SERPL-MCNC: 37 MG/DL (ref 40–59)
HGB BLD-MCNC: 12.2 G/DL (ref 13–17.5)
HGB UR QL STRIP.AUTO: NEGATIVE
IMM GRANULOCYTES # BLD AUTO: 0.01 X10(3) UL (ref 0–1)
IMM GRANULOCYTES NFR BLD: 0.3 %
KETONES UR-MCNC: NEGATIVE MG/DL
LDLC SERPL CALC-MCNC: 150 MG/DL (ref ?–100)
LEUKOCYTE ESTERASE UR QL STRIP.AUTO: NEGATIVE
LYMPHOCYTES # BLD AUTO: 0.85 X10(3) UL (ref 1–4)
LYMPHOCYTES NFR BLD AUTO: 29.1 %
MCH RBC QN AUTO: 30.2 PG (ref 26–34)
MCHC RBC AUTO-ENTMCNC: 33.2 G/DL (ref 31–37)
MCV RBC AUTO: 91.1 FL (ref 80–100)
MONOCYTES # BLD AUTO: 0.37 X10(3) UL (ref 0.1–1)
MONOCYTES NFR BLD AUTO: 12.7 %
NEUTROPHILS # BLD AUTO: 1.5 X10 (3) UL (ref 1.5–7.7)
NEUTROPHILS # BLD AUTO: 1.5 X10(3) UL (ref 1.5–7.7)
NEUTROPHILS NFR BLD AUTO: 51.4 %
NITRITE UR QL STRIP.AUTO: NEGATIVE
NONHDLC SERPL-MCNC: 184 MG/DL (ref ?–130)
OSMOLALITY SERPL CALC.SUM OF ELEC: 286 MOSM/KG (ref 275–295)
PH UR: 7 (ref 5–8)
PLATELET # BLD AUTO: 154 10(3)UL (ref 150–450)
POTASSIUM SERPL-SCNC: 4.6 MMOL/L (ref 3.5–5.1)
PROT UR-MCNC: NEGATIVE MG/DL
RBC # BLD AUTO: 4.04 X10(6)UL (ref 3.8–5.8)
SODIUM SERPL-SCNC: 137 MMOL/L (ref 136–145)
SP GR UR STRIP: 1.01 (ref 1–1.03)
TRIGL SERPL-MCNC: 186 MG/DL (ref 30–149)
TROPONIN I SERPL HS-MCNC: 7 NG/L (ref ?–53)
UROBILINOGEN UR STRIP-ACNC: NORMAL
VLDLC SERPL CALC-MCNC: 36 MG/DL (ref 0–30)
WBC # BLD AUTO: 2.9 X10(3) UL (ref 4–11)

## 2025-08-27 PROCEDURE — 70496 CT ANGIOGRAPHY HEAD: CPT | Performed by: OTHER

## 2025-08-27 PROCEDURE — 71045 X-RAY EXAM CHEST 1 VIEW: CPT | Performed by: EMERGENCY MEDICINE

## 2025-08-27 PROCEDURE — 70498 CT ANGIOGRAPHY NECK: CPT | Performed by: OTHER

## 2025-08-27 PROCEDURE — 70551 MRI BRAIN STEM W/O DYE: CPT | Performed by: OTHER

## 2025-08-27 PROCEDURE — 70450 CT HEAD/BRAIN W/O DYE: CPT | Performed by: EMERGENCY MEDICINE

## 2025-08-27 RX ORDER — PANTOPRAZOLE SODIUM 40 MG/1
40 TABLET, DELAYED RELEASE ORAL
Status: DISCONTINUED | OUTPATIENT
Start: 2025-08-27 | End: 2025-08-28

## 2025-08-27 RX ORDER — METOCLOPRAMIDE HYDROCHLORIDE 5 MG/ML
5 INJECTION INTRAMUSCULAR; INTRAVENOUS EVERY 8 HOURS PRN
Status: DISCONTINUED | OUTPATIENT
Start: 2025-08-27 | End: 2025-08-28

## 2025-08-27 RX ORDER — CARVEDILOL 6.25 MG/1
6.25 TABLET ORAL 2 TIMES DAILY WITH MEALS
Status: DISCONTINUED | OUTPATIENT
Start: 2025-08-27 | End: 2025-08-28

## 2025-08-27 RX ORDER — BISACODYL 10 MG
10 SUPPOSITORY, RECTAL RECTAL
Status: DISCONTINUED | OUTPATIENT
Start: 2025-08-27 | End: 2025-08-28

## 2025-08-27 RX ORDER — EZETIMIBE 10 MG/1
10 TABLET ORAL NIGHTLY
Status: DISCONTINUED | OUTPATIENT
Start: 2025-08-27 | End: 2025-08-28

## 2025-08-27 RX ORDER — HYDRALAZINE HYDROCHLORIDE 20 MG/ML
10 INJECTION INTRAMUSCULAR; INTRAVENOUS EVERY 2 HOUR PRN
Status: DISCONTINUED | OUTPATIENT
Start: 2025-08-27 | End: 2025-08-28

## 2025-08-27 RX ORDER — CLOPIDOGREL BISULFATE 75 MG/1
75 TABLET ORAL DAILY
Status: DISCONTINUED | OUTPATIENT
Start: 2025-08-28 | End: 2025-08-28

## 2025-08-27 RX ORDER — ACETAMINOPHEN 325 MG/1
650 TABLET ORAL EVERY 4 HOURS PRN
Status: DISCONTINUED | OUTPATIENT
Start: 2025-08-27 | End: 2025-08-28

## 2025-08-27 RX ORDER — ALFUZOSIN HYDROCHLORIDE 10 MG/1
10 TABLET, EXTENDED RELEASE ORAL DAILY
COMMUNITY
Start: 2025-08-11 | End: 2025-09-10

## 2025-08-27 RX ORDER — ACETAMINOPHEN 650 MG/1
650 SUPPOSITORY RECTAL EVERY 4 HOURS PRN
Status: DISCONTINUED | OUTPATIENT
Start: 2025-08-27 | End: 2025-08-28

## 2025-08-27 RX ORDER — LORAZEPAM 0.5 MG/1
0.5 TABLET ORAL
Status: DISCONTINUED | OUTPATIENT
Start: 2025-08-27 | End: 2025-08-28

## 2025-08-27 RX ORDER — HEPARIN SODIUM 5000 [USP'U]/ML
5000 INJECTION, SOLUTION INTRAVENOUS; SUBCUTANEOUS EVERY 8 HOURS SCHEDULED
Status: DISCONTINUED | OUTPATIENT
Start: 2025-08-27 | End: 2025-08-28

## 2025-08-27 RX ORDER — ASPIRIN 81 MG/1
81 TABLET ORAL DAILY
Status: DISCONTINUED | OUTPATIENT
Start: 2025-08-28 | End: 2025-08-28

## 2025-08-27 RX ORDER — ACETAMINOPHEN 500 MG
1000 TABLET ORAL EVERY 6 HOURS PRN
Status: DISCONTINUED | OUTPATIENT
Start: 2025-08-27 | End: 2025-08-28

## 2025-08-27 RX ORDER — LABETALOL HYDROCHLORIDE 5 MG/ML
10 INJECTION, SOLUTION INTRAVENOUS EVERY 10 MIN PRN
Status: COMPLETED | OUTPATIENT
Start: 2025-08-27 | End: 2025-08-27

## 2025-08-27 RX ORDER — ONDANSETRON 2 MG/ML
4 INJECTION INTRAMUSCULAR; INTRAVENOUS EVERY 6 HOURS PRN
Status: DISCONTINUED | OUTPATIENT
Start: 2025-08-27 | End: 2025-08-28

## 2025-08-27 RX ORDER — TAMSULOSIN HYDROCHLORIDE 0.4 MG/1
0.4 CAPSULE ORAL DAILY
Status: DISCONTINUED | OUTPATIENT
Start: 2025-08-28 | End: 2025-08-28

## 2025-08-27 RX ORDER — ASPIRIN 81 MG/1
324 TABLET, CHEWABLE ORAL ONCE
Status: COMPLETED | OUTPATIENT
Start: 2025-08-27 | End: 2025-08-27

## 2025-08-27 RX ORDER — SODIUM CHLORIDE 9 MG/ML
INJECTION, SOLUTION INTRAVENOUS CONTINUOUS
Status: DISCONTINUED | OUTPATIENT
Start: 2025-08-27 | End: 2025-08-28

## 2025-08-27 RX ORDER — POLYETHYLENE GLYCOL 3350 17 G/17G
17 POWDER, FOR SOLUTION ORAL DAILY PRN
Status: DISCONTINUED | OUTPATIENT
Start: 2025-08-27 | End: 2025-08-28

## 2025-08-27 RX ORDER — SENNOSIDES 8.6 MG
17.2 TABLET ORAL NIGHTLY PRN
Status: DISCONTINUED | OUTPATIENT
Start: 2025-08-27 | End: 2025-08-28

## 2025-08-27 RX ORDER — MULTIVITAMIN/IRON/FOLIC ACID 18MG-0.4MG
500 TABLET ORAL DAILY
Status: DISCONTINUED | OUTPATIENT
Start: 2025-08-28 | End: 2025-08-28

## 2025-08-28 ENCOUNTER — APPOINTMENT (OUTPATIENT)
Dept: CV DIAGNOSTICS | Facility: HOSPITAL | Age: 85
End: 2025-08-28
Attending: HOSPITALIST

## 2025-08-28 VITALS
RESPIRATION RATE: 18 BRPM | WEIGHT: 175.13 LBS | HEART RATE: 56 BPM | OXYGEN SATURATION: 97 % | HEIGHT: 72 IN | DIASTOLIC BLOOD PRESSURE: 70 MMHG | BODY MASS INDEX: 23.72 KG/M2 | SYSTOLIC BLOOD PRESSURE: 139 MMHG | TEMPERATURE: 98 F

## 2025-08-28 LAB
ANION GAP SERPL CALC-SCNC: 7 MMOL/L (ref 0–18)
ATRIAL RATE: 60 BPM
BASOPHILS # BLD AUTO: 0.02 X10(3) UL (ref 0–0.2)
BASOPHILS NFR BLD AUTO: 0.6 %
BUN BLD-MCNC: 16 MG/DL (ref 9–23)
BUN/CREAT SERPL: 10.5 (ref 10–20)
CALCIUM BLD-MCNC: 9.6 MG/DL (ref 8.7–10.4)
CHLORIDE SERPL-SCNC: 103 MMOL/L (ref 98–112)
CO2 SERPL-SCNC: 28 MMOL/L (ref 21–32)
CREAT BLD-MCNC: 1.53 MG/DL (ref 0.7–1.3)
DEPRECATED RDW RBC AUTO: 46.8 FL (ref 35.1–46.3)
EGFRCR SERPLBLD CKD-EPI 2021: 44 ML/MIN/1.73M2 (ref 60–?)
EOSINOPHIL # BLD AUTO: 0.16 X10(3) UL (ref 0–0.7)
EOSINOPHIL NFR BLD AUTO: 4.7 %
ERYTHROCYTE [DISTWIDTH] IN BLOOD BY AUTOMATED COUNT: 13.9 % (ref 11–15)
GLUCOSE BLD-MCNC: 94 MG/DL (ref 70–99)
GLUCOSE BLDC GLUCOMTR-MCNC: 108 MG/DL (ref 70–99)
GLUCOSE BLDC GLUCOMTR-MCNC: 112 MG/DL (ref 70–99)
HCT VFR BLD AUTO: 36.5 % (ref 39–53)
HGB BLD-MCNC: 12.2 G/DL (ref 13–17.5)
IMM GRANULOCYTES # BLD AUTO: 0.01 X10(3) UL (ref 0–1)
IMM GRANULOCYTES NFR BLD: 0.3 %
LYMPHOCYTES # BLD AUTO: 0.94 X10(3) UL (ref 1–4)
LYMPHOCYTES NFR BLD AUTO: 27.8 %
MCH RBC QN AUTO: 30.5 PG (ref 26–34)
MCHC RBC AUTO-ENTMCNC: 33.4 G/DL (ref 31–37)
MCV RBC AUTO: 91.3 FL (ref 80–100)
MONOCYTES # BLD AUTO: 0.4 X10(3) UL (ref 0.1–1)
MONOCYTES NFR BLD AUTO: 11.8 %
NEUTROPHILS # BLD AUTO: 1.85 X10 (3) UL (ref 1.5–7.7)
NEUTROPHILS # BLD AUTO: 1.85 X10(3) UL (ref 1.5–7.7)
NEUTROPHILS NFR BLD AUTO: 54.8 %
OSMOLALITY SERPL CALC.SUM OF ELEC: 287 MOSM/KG (ref 275–295)
P AXIS: 19 DEGREES
P-R INTERVAL: 162 MS
PLATELET # BLD AUTO: 143 10(3)UL (ref 150–450)
POTASSIUM SERPL-SCNC: 4.3 MMOL/L (ref 3.5–5.1)
Q-T INTERVAL: 422 MS
QRS DURATION: 88 MS
QTC CALCULATION (BEZET): 422 MS
R AXIS: 17 DEGREES
RBC # BLD AUTO: 4 X10(6)UL (ref 3.8–5.8)
SODIUM SERPL-SCNC: 138 MMOL/L (ref 136–145)
T AXIS: 31 DEGREES
VENTRICULAR RATE: 60 BPM
WBC # BLD AUTO: 3.4 X10(3) UL (ref 4–11)

## 2025-08-28 PROCEDURE — 99222 1ST HOSP IP/OBS MODERATE 55: CPT | Performed by: OTHER

## 2025-08-28 PROCEDURE — 93306 TTE W/DOPPLER COMPLETE: CPT | Performed by: HOSPITALIST

## 2025-08-28 RX ORDER — AMLODIPINE BESYLATE 5 MG/1
5 TABLET ORAL DAILY
Status: DISCONTINUED | OUTPATIENT
Start: 2025-08-28 | End: 2025-08-28

## 2025-08-28 RX ORDER — LOSARTAN POTASSIUM 25 MG/1
25 TABLET ORAL DAILY
Status: DISCONTINUED | OUTPATIENT
Start: 2025-08-28 | End: 2025-08-28

## (undated) DEVICE — Device: Brand: DEFENDO AIR/WATER/SUCTION AND BIOPSY VALVE

## (undated) DEVICE — Device

## (undated) DEVICE — GUIDEWIRE INQWR 210CM 1.5MM J CRV .035IN VASC PTFE HERAPIN

## (undated) DEVICE — SHIELD RAD RADPAD RAD PRTC STRL FEM ENTRY ANGIO

## (undated) DEVICE — ENDOSCOPY PACK - LOWER: Brand: MEDLINE INDUSTRIES, INC.

## (undated) DEVICE — ADAPTER TBG 2 MALE LL DEHP-FR STRL LF MEDEX 1IN DISP .1ML IV

## (undated) DEVICE — DEVICE GTWY ENCORE ADV 20ML KIT TRQ GW INTRO INFL 20MM 3MM

## (undated) DEVICE — CANNULA NSL ADPR SET NOMOLINE

## (undated) DEVICE — GLOVE SURG 6.5 PROTEXIS LF CRM PF BEAD CUFF STRL PLISPRN

## (undated) DEVICE — SHEATH 6FR 10CM 2.5CM .035IN INTRO SNAP ON DIL LOCK KINK RST

## (undated) DEVICE — SYRINGE 5ML GRAD N-PYRG DEHP-FR PVC FREE STRL MED LF DISP LL

## (undated) DEVICE — GUIDEWIRE RUNTHROUGH 3CM 180CM .36MM VASC RADOPQ X FLPY STRL

## (undated) DEVICE — NEEDLE HPO 18GA 1.5IN REG WALL REG BVL LL HUB CLR CD DEHP-FR

## (undated) DEVICE — SNARE CAPTIFLEX MICRO-OVL OLY

## (undated) DEVICE — KIT MICROINTRODUCER 4FR .018IN 40CM 7CM .018IN SFTP MNDRL

## (undated) DEVICE — BAG WST 48IN SPK FLTR RLLR CLAMP FEMALE LL TUBE VENT MERIT

## (undated) DEVICE — SYRINGE 8ML ROTATE MALE LL ADPR RING GRIP MED INJECT8 CCS

## (undated) DEVICE — GUIDEWIRE 150CM 3MM RDS J CRV .035IN VASC PTFE FX CORE LF

## (undated) DEVICE — 3M™ RED DOT™ MONITORING ELECTRODE WITH FOAM TAPE AND STICKY GEL, 50/BAG, 20/CASE, 72/PLT 2570: Brand: RED DOT™

## (undated) DEVICE — CATHETER 6FR IM CRV 100CM FULL LGTH WIRE BRAID ROBUST SHAFT

## (undated) DEVICE — FILTERLINE NASAL ADULT O2/CO2

## (undated) DEVICE — DECANTER FLUID DSPNSR BAG BAJ DISP STRL LF ASEPTIC TRNSF

## (undated) DEVICE — GLOVE SURG SENSICARE SZ 7

## (undated) DEVICE — VALVE GUARDIAN 2 VSI HEMOSTASIS 8FR GW INS TOOL ROTOLOCK

## (undated) DEVICE — CATHETER 6FR PGTL FL4 FR4 CRV 100110CM FULL LGTH WIRE BRAID

## (undated) DEVICE — GLOVE SURG 7.5 PROTEXIS LF CRM PF SMTH BEAD CUFF STRL

## (undated) DEVICE — SET XTN 3ML 21IN FEMALE LL DIST CNCT STD BORE DEHP-FR LF

## (undated) DEVICE — GOWN SURG XL L4 IMPRV REINFORCE SET IN SLV STRL LF DISP BLUE

## (undated) DEVICE — TRAP 4 CPTR CHMBR N EZ INLN

## (undated) DEVICE — 1200CC GUARDIAN II: Brand: GUARDIAN

## (undated) DEVICE — ELECTRODE DFBR UNV 15.2X10.8CM ADH PAD RDTRNS POUCH PADPRO

## (undated) DEVICE — FORCEP BIOPSY RJ4 LG CAP W/ND

## (undated) DEVICE — COVER PROBE FLX-FEEL 58X6IN OR 4 FLAG 2 SHT 24 PRINT STRL LF

## (undated) NOTE — LETTER
4/30/2018          Gregg Manish  [address] [place] [id number]    Dear Lambert Puente,       Your colonoscopy showed a polyp in the colon that was removed. Here are the  biopsy/pathology findings from your recent Colonoscopy :     Adeno